# Patient Record
Sex: MALE | Race: WHITE | NOT HISPANIC OR LATINO | Employment: OTHER | URBAN - METROPOLITAN AREA
[De-identification: names, ages, dates, MRNs, and addresses within clinical notes are randomized per-mention and may not be internally consistent; named-entity substitution may affect disease eponyms.]

---

## 2017-02-25 PROBLEM — F29 UNSPECIFIED PSYCHOSIS: Status: ACTIVE | Noted: 2017-02-25

## 2017-02-25 PROBLEM — F25.0 SCHIZOAFFECTIVE DISORDER, BIPOLAR TYPE: Chronic | Status: ACTIVE | Noted: 2017-02-25

## 2017-02-25 PROBLEM — I15.9 SECONDARY HYPERTENSION: Chronic | Status: ACTIVE | Noted: 2017-02-25

## 2017-03-06 PROBLEM — I15.9 SECONDARY HYPERTENSION: Chronic | Status: ACTIVE | Noted: 2017-03-06

## 2017-03-06 PROBLEM — F17.210 CIGARETTE NICOTINE DEPENDENCE, UNCOMPLICATED: Status: ACTIVE | Noted: 2017-03-06

## 2017-03-06 PROBLEM — M54.9 BACK PAIN: Status: ACTIVE | Noted: 2017-03-06

## 2022-05-22 ENCOUNTER — HOSPITAL ENCOUNTER (INPATIENT)
Facility: HOSPITAL | Age: 62
LOS: 6 days | Discharge: PSYCHIATRIC OR SUBSTANCE USE FACILITY | DRG: 917 | End: 2022-05-28
Attending: EMERGENCY MEDICINE | Admitting: INTERNAL MEDICINE
Payer: COMMERCIAL

## 2022-05-22 DIAGNOSIS — T50.902D INTENTIONAL DRUG OVERDOSE, SUBSEQUENT ENCOUNTER: ICD-10-CM

## 2022-05-22 DIAGNOSIS — T14.91XA SUICIDE ATTEMPT: ICD-10-CM

## 2022-05-22 DIAGNOSIS — I49.9 ARRHYTHMIA: ICD-10-CM

## 2022-05-22 DIAGNOSIS — J96.01 ACUTE HYPOXEMIC RESPIRATORY FAILURE: ICD-10-CM

## 2022-05-22 DIAGNOSIS — T50.902A INTENTIONAL DRUG OVERDOSE, INITIAL ENCOUNTER: ICD-10-CM

## 2022-05-22 DIAGNOSIS — R79.89 ELEVATED TROPONIN: ICD-10-CM

## 2022-05-22 DIAGNOSIS — I42.9 CARDIOMYOPATHY, UNSPECIFIED TYPE: ICD-10-CM

## 2022-05-22 DIAGNOSIS — Z46.59 ENCOUNTER FOR FEEDING TUBE PLACEMENT: ICD-10-CM

## 2022-05-22 DIAGNOSIS — I50.41 ACUTE COMBINED SYSTOLIC AND DIASTOLIC CONGESTIVE HEART FAILURE: ICD-10-CM

## 2022-05-22 DIAGNOSIS — I50.9 CHF (CONGESTIVE HEART FAILURE): ICD-10-CM

## 2022-05-22 DIAGNOSIS — R00.0 TACHYCARDIA: ICD-10-CM

## 2022-05-22 DIAGNOSIS — R94.31 PROLONGED QT INTERVAL: ICD-10-CM

## 2022-05-22 DIAGNOSIS — Z97.8 ENDOTRACHEALLY INTUBATED: ICD-10-CM

## 2022-05-22 DIAGNOSIS — M62.82 NON-TRAUMATIC RHABDOMYOLYSIS: Primary | ICD-10-CM

## 2022-05-22 DIAGNOSIS — R50.9 HYPERTHERMIA: ICD-10-CM

## 2022-05-22 LAB
ALBUMIN SERPL BCP-MCNC: 3.2 G/DL (ref 3.5–5.2)
ALLENS TEST: ABNORMAL
ALP SERPL-CCNC: 41 U/L (ref 55–135)
ALT SERPL W/O P-5'-P-CCNC: 16 U/L (ref 10–44)
AMPHET+METHAMPHET UR QL: NEGATIVE
ANION GAP SERPL CALC-SCNC: 13 MMOL/L (ref 8–16)
APAP SERPL-MCNC: <3 UG/ML (ref 10–20)
AST SERPL-CCNC: 27 U/L (ref 10–40)
BARBITURATES UR QL SCN>200 NG/ML: NEGATIVE
BASOPHILS # BLD AUTO: 0.01 K/UL (ref 0–0.2)
BASOPHILS NFR BLD: 0.1 % (ref 0–1.9)
BENZODIAZ UR QL SCN>200 NG/ML: NEGATIVE
BILIRUB SERPL-MCNC: 1.3 MG/DL (ref 0.1–1)
BILIRUB UR QL STRIP: NEGATIVE
BNP SERPL-MCNC: 90 PG/ML (ref 0–99)
BUN SERPL-MCNC: 14 MG/DL (ref 8–23)
BZE UR QL SCN: NEGATIVE
CALCIUM SERPL-MCNC: 8.7 MG/DL (ref 8.7–10.5)
CANNABINOIDS UR QL SCN: NEGATIVE
CHLORIDE SERPL-SCNC: 115 MMOL/L (ref 95–110)
CK SERPL-CCNC: 1138 U/L (ref 20–200)
CLARITY UR: CLEAR
CO2 SERPL-SCNC: 18 MMOL/L (ref 23–29)
COLOR UR: YELLOW
CREAT SERPL-MCNC: 1.6 MG/DL (ref 0.5–1.4)
CREAT UR-MCNC: 51.3 MG/DL (ref 23–375)
CTP QC/QA: YES
CTP QC/QA: YES
DELSYS: ABNORMAL
DIFFERENTIAL METHOD: ABNORMAL
EOSINOPHIL # BLD AUTO: 0 K/UL (ref 0–0.5)
EOSINOPHIL NFR BLD: 0 % (ref 0–8)
ERYTHROCYTE [DISTWIDTH] IN BLOOD BY AUTOMATED COUNT: 14 % (ref 11.5–14.5)
ERYTHROCYTE [SEDIMENTATION RATE] IN BLOOD BY WESTERGREN METHOD: 24 MM/H
EST. GFR  (AFRICAN AMERICAN): 52 ML/MIN/1.73 M^2
EST. GFR  (NON AFRICAN AMERICAN): 45 ML/MIN/1.73 M^2
ETHANOL SERPL-MCNC: <10 MG/DL
FIO2: 100
GLUCOSE SERPL-MCNC: 122 MG/DL (ref 70–110)
GLUCOSE UR QL STRIP: NEGATIVE
HCO3 UR-SCNC: 20.5 MMOL/L (ref 24–28)
HCT VFR BLD AUTO: 40.3 % (ref 40–54)
HCV AB SERPL QL IA: NEGATIVE
HEP C VIRUS HOLD SPECIMEN: NORMAL
HGB BLD-MCNC: 14.2 G/DL (ref 14–18)
HGB UR QL STRIP: NEGATIVE
HIV 1+2 AB+HIV1 P24 AG SERPL QL IA: NEGATIVE
IMM GRANULOCYTES # BLD AUTO: 0.05 K/UL (ref 0–0.04)
IMM GRANULOCYTES NFR BLD AUTO: 0.4 % (ref 0–0.5)
KETONES UR QL STRIP: NEGATIVE
LACTATE SERPL-SCNC: 3.9 MMOL/L (ref 0.5–2.2)
LEUKOCYTE ESTERASE UR QL STRIP: NEGATIVE
LITHIUM SERPL-SCNC: <0.1 MMOL/L (ref 0.6–1.2)
LYMPHOCYTES # BLD AUTO: 0.8 K/UL (ref 1–4.8)
LYMPHOCYTES NFR BLD: 7.1 % (ref 18–48)
MCH RBC QN AUTO: 31.6 PG (ref 27–31)
MCHC RBC AUTO-ENTMCNC: 35.2 G/DL (ref 32–36)
MCV RBC AUTO: 90 FL (ref 82–98)
METHADONE UR QL SCN>300 NG/ML: NEGATIVE
MODE: ABNORMAL
MONOCYTES # BLD AUTO: 1 K/UL (ref 0.3–1)
MONOCYTES NFR BLD: 8.6 % (ref 4–15)
NEUTROPHILS # BLD AUTO: 9.6 K/UL (ref 1.8–7.7)
NEUTROPHILS NFR BLD: 83.8 % (ref 38–73)
NITRITE UR QL STRIP: NEGATIVE
NRBC BLD-RTO: 0 /100 WBC
OPIATES UR QL SCN: NEGATIVE
PCO2 BLDA: 43.9 MMHG (ref 35–45)
PCP UR QL SCN>25 NG/ML: NEGATIVE
PEEP: 5
PH SMN: 7.28 [PH] (ref 7.35–7.45)
PH UR STRIP: 6 [PH] (ref 5–8)
PLATELET # BLD AUTO: 160 K/UL (ref 150–450)
PMV BLD AUTO: 8.8 FL (ref 9.2–12.9)
PO2 BLDA: 223 MMHG (ref 80–100)
POC BE: -6 MMOL/L
POC MOLECULAR INFLUENZA A AGN: NEGATIVE
POC MOLECULAR INFLUENZA B AGN: NEGATIVE
POC SATURATED O2: 100 % (ref 95–100)
POTASSIUM SERPL-SCNC: 3.3 MMOL/L (ref 3.5–5.1)
PROT SERPL-MCNC: 6 G/DL (ref 6–8.4)
PROT UR QL STRIP: NEGATIVE
RBC # BLD AUTO: 4.5 M/UL (ref 4.6–6.2)
SALICYLATES SERPL-MCNC: <5 MG/DL (ref 15–30)
SAMPLE: ABNORMAL
SARS-COV-2 RDRP RESP QL NAA+PROBE: NEGATIVE
SITE: ABNORMAL
SODIUM SERPL-SCNC: 146 MMOL/L (ref 136–145)
SP GR UR STRIP: 1.01 (ref 1–1.03)
TOXICOLOGY INFORMATION: NORMAL
TROPONIN I SERPL DL<=0.01 NG/ML-MCNC: 0.05 NG/ML (ref 0–0.03)
TSH SERPL DL<=0.005 MIU/L-ACNC: 0.79 UIU/ML (ref 0.4–4)
URN SPEC COLLECT METH UR: NORMAL
UROBILINOGEN UR STRIP-ACNC: NEGATIVE EU/DL
VT: 440
WBC # BLD AUTO: 11.42 K/UL (ref 3.9–12.7)

## 2022-05-22 PROCEDURE — 84484 ASSAY OF TROPONIN QUANT: CPT | Performed by: EMERGENCY MEDICINE

## 2022-05-22 PROCEDURE — 27100108

## 2022-05-22 PROCEDURE — 96374 THER/PROPH/DIAG INJ IV PUSH: CPT | Mod: 59

## 2022-05-22 PROCEDURE — 99291 CRITICAL CARE FIRST HOUR: CPT | Mod: 25

## 2022-05-22 PROCEDURE — 87502 INFLUENZA DNA AMP PROBE: CPT

## 2022-05-22 PROCEDURE — 25000003 PHARM REV CODE 250: Performed by: INTERNAL MEDICINE

## 2022-05-22 PROCEDURE — 20000000 HC ICU ROOM

## 2022-05-22 PROCEDURE — 93005 ELECTROCARDIOGRAM TRACING: CPT

## 2022-05-22 PROCEDURE — 93010 EKG 12-LEAD: ICD-10-PCS | Mod: 76,,, | Performed by: INTERNAL MEDICINE

## 2022-05-22 PROCEDURE — 96366 THER/PROPH/DIAG IV INF ADDON: CPT

## 2022-05-22 PROCEDURE — 99900035 HC TECH TIME PER 15 MIN (STAT)

## 2022-05-22 PROCEDURE — 99292 CRITICAL CARE ADDL 30 MIN: CPT

## 2022-05-22 PROCEDURE — 96360 HYDRATION IV INFUSION INIT: CPT | Mod: 59

## 2022-05-22 PROCEDURE — 82077 ASSAY SPEC XCP UR&BREATH IA: CPT | Performed by: EMERGENCY MEDICINE

## 2022-05-22 PROCEDURE — 82803 BLOOD GASES ANY COMBINATION: CPT

## 2022-05-22 PROCEDURE — 82550 ASSAY OF CK (CPK): CPT | Performed by: EMERGENCY MEDICINE

## 2022-05-22 PROCEDURE — 93010 ELECTROCARDIOGRAM REPORT: CPT | Mod: 76,,, | Performed by: INTERNAL MEDICINE

## 2022-05-22 PROCEDURE — 87040 BLOOD CULTURE FOR BACTERIA: CPT | Performed by: EMERGENCY MEDICINE

## 2022-05-22 PROCEDURE — 96365 THER/PROPH/DIAG IV INF INIT: CPT | Mod: 59

## 2022-05-22 PROCEDURE — U0002 COVID-19 LAB TEST NON-CDC: HCPCS | Performed by: EMERGENCY MEDICINE

## 2022-05-22 PROCEDURE — 83880 ASSAY OF NATRIURETIC PEPTIDE: CPT | Performed by: EMERGENCY MEDICINE

## 2022-05-22 PROCEDURE — 63600175 PHARM REV CODE 636 W HCPCS

## 2022-05-22 PROCEDURE — 81003 URINALYSIS AUTO W/O SCOPE: CPT | Mod: 59 | Performed by: EMERGENCY MEDICINE

## 2022-05-22 PROCEDURE — 31500 INSERT EMERGENCY AIRWAY: CPT

## 2022-05-22 PROCEDURE — 63600175 PHARM REV CODE 636 W HCPCS: Performed by: EMERGENCY MEDICINE

## 2022-05-22 PROCEDURE — 25000003 PHARM REV CODE 250

## 2022-05-22 PROCEDURE — 80053 COMPREHEN METABOLIC PANEL: CPT | Performed by: EMERGENCY MEDICINE

## 2022-05-22 PROCEDURE — 36600 WITHDRAWAL OF ARTERIAL BLOOD: CPT

## 2022-05-22 PROCEDURE — 87389 HIV-1 AG W/HIV-1&-2 AB AG IA: CPT | Performed by: EMERGENCY MEDICINE

## 2022-05-22 PROCEDURE — A4216 STERILE WATER/SALINE, 10 ML: HCPCS | Performed by: INTERNAL MEDICINE

## 2022-05-22 PROCEDURE — 83605 ASSAY OF LACTIC ACID: CPT | Performed by: EMERGENCY MEDICINE

## 2022-05-22 PROCEDURE — 93010 ELECTROCARDIOGRAM REPORT: CPT | Mod: ,,, | Performed by: INTERNAL MEDICINE

## 2022-05-22 PROCEDURE — 25000003 PHARM REV CODE 250: Performed by: EMERGENCY MEDICINE

## 2022-05-22 PROCEDURE — 86803 HEPATITIS C AB TEST: CPT | Performed by: EMERGENCY MEDICINE

## 2022-05-22 PROCEDURE — 27200966 HC CLOSED SUCTION SYSTEM

## 2022-05-22 PROCEDURE — 84443 ASSAY THYROID STIM HORMONE: CPT | Performed by: EMERGENCY MEDICINE

## 2022-05-22 PROCEDURE — 80307 DRUG TEST PRSMV CHEM ANLYZR: CPT | Performed by: EMERGENCY MEDICINE

## 2022-05-22 PROCEDURE — 94002 VENT MGMT INPAT INIT DAY: CPT

## 2022-05-22 PROCEDURE — 80143 DRUG ASSAY ACETAMINOPHEN: CPT | Performed by: EMERGENCY MEDICINE

## 2022-05-22 PROCEDURE — 80179 DRUG ASSAY SALICYLATE: CPT | Performed by: EMERGENCY MEDICINE

## 2022-05-22 PROCEDURE — 80337 TRICYCLIC & CYCLICALS 6/MORE: CPT | Performed by: EMERGENCY MEDICINE

## 2022-05-22 PROCEDURE — 85025 COMPLETE CBC W/AUTO DIFF WBC: CPT | Performed by: EMERGENCY MEDICINE

## 2022-05-22 PROCEDURE — 27000221 HC OXYGEN, UP TO 24 HOURS

## 2022-05-22 PROCEDURE — 80178 ASSAY OF LITHIUM: CPT | Performed by: EMERGENCY MEDICINE

## 2022-05-22 RX ORDER — ENOXAPARIN SODIUM 100 MG/ML
40 INJECTION SUBCUTANEOUS EVERY 24 HOURS
Status: DISCONTINUED | OUTPATIENT
Start: 2022-05-23 | End: 2022-05-29 | Stop reason: HOSPADM

## 2022-05-22 RX ORDER — NOREPINEPHRINE BITARTRATE/D5W 4MG/250ML
0-3 PLASTIC BAG, INJECTION (ML) INTRAVENOUS CONTINUOUS
Status: DISCONTINUED | OUTPATIENT
Start: 2022-05-23 | End: 2022-05-23

## 2022-05-22 RX ORDER — LANOLIN ALCOHOL/MO/W.PET/CERES
800 CREAM (GRAM) TOPICAL
Status: DISCONTINUED | OUTPATIENT
Start: 2022-05-22 | End: 2022-05-23

## 2022-05-22 RX ORDER — PROPOFOL 10 MG/ML
5 INJECTION, EMULSION INTRAVENOUS CONTINUOUS
Status: DISCONTINUED | OUTPATIENT
Start: 2022-05-22 | End: 2022-05-22

## 2022-05-22 RX ORDER — GLUCAGON 1 MG
1 KIT INJECTION
Status: DISCONTINUED | OUTPATIENT
Start: 2022-05-22 | End: 2022-05-29 | Stop reason: HOSPADM

## 2022-05-22 RX ORDER — TALC
6 POWDER (GRAM) TOPICAL NIGHTLY PRN
Status: DISCONTINUED | OUTPATIENT
Start: 2022-05-22 | End: 2022-05-29 | Stop reason: HOSPADM

## 2022-05-22 RX ORDER — ETOMIDATE 2 MG/ML
20 INJECTION INTRAVENOUS
Status: COMPLETED | OUTPATIENT
Start: 2022-05-22 | End: 2022-05-22

## 2022-05-22 RX ORDER — SODIUM CHLORIDE 0.9 % (FLUSH) 0.9 %
10 SYRINGE (ML) INJECTION EVERY 8 HOURS
Status: DISCONTINUED | OUTPATIENT
Start: 2022-05-22 | End: 2022-05-29 | Stop reason: HOSPADM

## 2022-05-22 RX ORDER — IBUPROFEN 200 MG
24 TABLET ORAL
Status: DISCONTINUED | OUTPATIENT
Start: 2022-05-22 | End: 2022-05-29 | Stop reason: HOSPADM

## 2022-05-22 RX ORDER — ETOMIDATE 2 MG/ML
INJECTION INTRAVENOUS
Status: COMPLETED
Start: 2022-05-22 | End: 2022-05-22

## 2022-05-22 RX ORDER — HYDROCODONE BITARTRATE AND ACETAMINOPHEN 5; 325 MG/1; MG/1
1 TABLET ORAL EVERY 6 HOURS PRN
Status: DISCONTINUED | OUTPATIENT
Start: 2022-05-22 | End: 2022-05-23

## 2022-05-22 RX ORDER — NALOXONE HYDROCHLORIDE 1 MG/ML
0.02 INJECTION INTRAMUSCULAR; INTRAVENOUS; SUBCUTANEOUS
Status: DISCONTINUED | OUTPATIENT
Start: 2022-05-22 | End: 2022-05-29 | Stop reason: HOSPADM

## 2022-05-22 RX ORDER — IBUPROFEN 200 MG
16 TABLET ORAL
Status: DISCONTINUED | OUTPATIENT
Start: 2022-05-22 | End: 2022-05-29 | Stop reason: HOSPADM

## 2022-05-22 RX ORDER — NAPROXEN SODIUM 220 MG/1
81 TABLET, FILM COATED ORAL DAILY
Status: DISCONTINUED | OUTPATIENT
Start: 2022-05-23 | End: 2022-05-29 | Stop reason: HOSPADM

## 2022-05-22 RX ORDER — ACETAMINOPHEN 325 MG/1
650 TABLET ORAL EVERY 4 HOURS PRN
Status: DISCONTINUED | OUTPATIENT
Start: 2022-05-22 | End: 2022-05-29 | Stop reason: HOSPADM

## 2022-05-22 RX ORDER — ROCURONIUM BROMIDE 10 MG/ML
INJECTION, SOLUTION INTRAVENOUS
Status: COMPLETED
Start: 2022-05-22 | End: 2022-05-22

## 2022-05-22 RX ORDER — ROCURONIUM BROMIDE 10 MG/ML
70 INJECTION, SOLUTION INTRAVENOUS ONCE
Status: COMPLETED | OUTPATIENT
Start: 2022-05-22 | End: 2022-05-22

## 2022-05-22 RX ORDER — POLYETHYLENE GLYCOL 3350 17 G/17G
17 POWDER, FOR SOLUTION ORAL DAILY PRN
Status: DISCONTINUED | OUTPATIENT
Start: 2022-05-22 | End: 2022-05-24

## 2022-05-22 RX ORDER — MORPHINE SULFATE 4 MG/ML
4 INJECTION, SOLUTION INTRAMUSCULAR; INTRAVENOUS
Status: DISCONTINUED | OUTPATIENT
Start: 2022-05-22 | End: 2022-05-23

## 2022-05-22 RX ORDER — PROPOFOL 10 MG/ML
5 INJECTION, EMULSION INTRAVENOUS CONTINUOUS
Status: DISCONTINUED | OUTPATIENT
Start: 2022-05-22 | End: 2022-05-23

## 2022-05-22 RX ORDER — PROPOFOL 10 MG/ML
INJECTION, EMULSION INTRAVENOUS
Status: COMPLETED
Start: 2022-05-22 | End: 2022-05-22

## 2022-05-22 RX ADMIN — SODIUM CHLORIDE, SODIUM LACTATE, POTASSIUM CHLORIDE, AND CALCIUM CHLORIDE 500 ML: .6; .31; .03; .02 INJECTION, SOLUTION INTRAVENOUS at 11:05

## 2022-05-22 RX ADMIN — ETOMIDATE 20 MG: 2 INJECTION INTRAVENOUS at 05:05

## 2022-05-22 RX ADMIN — SODIUM CHLORIDE, SODIUM LACTATE, POTASSIUM CHLORIDE, AND CALCIUM CHLORIDE 1000 ML: .6; .31; .03; .02 INJECTION, SOLUTION INTRAVENOUS at 07:05

## 2022-05-22 RX ADMIN — CEFTRIAXONE SODIUM 2 G: 2 INJECTION, SOLUTION INTRAVENOUS at 05:05

## 2022-05-22 RX ADMIN — Medication 0.02 MCG/KG/MIN: at 11:05

## 2022-05-22 RX ADMIN — PROPOFOL 5 MCG/KG/MIN: 10 INJECTION, EMULSION INTRAVENOUS at 05:05

## 2022-05-22 RX ADMIN — SODIUM BICARBONATE: 84 INJECTION, SOLUTION INTRAVENOUS at 11:05

## 2022-05-22 RX ADMIN — SODIUM CHLORIDE, SODIUM LACTATE, POTASSIUM CHLORIDE, AND CALCIUM CHLORIDE 2500 ML: .6; .31; .03; .02 INJECTION, SOLUTION INTRAVENOUS at 05:05

## 2022-05-22 RX ADMIN — ROCURONIUM BROMIDE 70 MG: 10 INJECTION, SOLUTION INTRAVENOUS at 05:05

## 2022-05-22 RX ADMIN — SODIUM BICARBONATE: 84 INJECTION, SOLUTION INTRAVENOUS at 05:05

## 2022-05-22 RX ADMIN — Medication 10 ML: at 11:05

## 2022-05-22 RX ADMIN — AZITHROMYCIN 500 MG: 500 INJECTION, POWDER, LYOPHILIZED, FOR SOLUTION INTRAVENOUS at 06:05

## 2022-05-22 NOTE — ED PROVIDER NOTES
SCRIBE #1 NOTE: I, Mildred Wetzel, am scribing for, and in the presence of, Erickson Arana MD. I have scribed the entire note.       History     Chief Complaint   Patient presents with    Drug Overdose     Pt last known well time 1700 5/21/22. Respiradol bottle found . Pt tachypneic and tachycardic. Incomprehensible moaning and vomitus on mouth      Review of patient's allergies indicates:   Allergen Reactions    Haldol [haloperidol lactate]      Shaking      Lamictal [lamotrigine]     Trileptal [oxcarbazepine]          History of Present Illness     HPI    5/22/2022, 5:13 PM  History obtained from the [facility], limited given patient's AMS      History of Present Illness: Tristin Saha is a 62 y.o. male patient who presents to the Emergency Department for evaluation of altered mental status with his last known well-time being 17:00, 5/21/22. According to [facility], pt was found vomiting and unresponsive by his wife in a hot apartment; additionally, an empty Respiradol bottle filled on 5/6/22 with 45 tablets to be taken twice a day was found next to him. [facility] states that his BP upon arrival was 74/47, and after 500 mL of fluids, pt's BP was 118/76. Pt also had a rectal temperature of 103 degrees and was at 140 BPM, with his breathing at 48 breaths per minute on arrival to ED. Pt noted to have vomit around his mouth and was incomprehensibly moaning as well. Symptoms are constant and moderate in severity. Prior Tx includes 2mg of narcan IN and 2mg of narcan IV via [facility]. No further complaints or concerns at this time. History is limited and the ROS cannot be completed due to mental status.    8:04 PM: Family at bedside. According to pt's wife, the pt probably overdosed sometime last night and purposefully did not turn on any air. Additionally, the family states that the pt has a history of suicidal ideations and paranoia. Has recently been stating he wanted to kill himself. Has overdosed on medication in front of  family before. They were unaware of a Risperdal prescription. Daughter brings u p concerns that the patient is not safe in his home environment.       Arrival mode: [facility]    PCP: Primary Doctor No        Past Medical History:  Past Medical History:   Diagnosis Date    Depression        Past Surgical History:  No past surgical history on file.      Family History:  No family history on file.    Social History:  Social History     Tobacco Use    Smoking status: Current Some Day Smoker    Smokeless tobacco: Not on file   Substance and Sexual Activity    Alcohol use: Not on file    Drug use: Not on file    Sexual activity: Not on file        Review of Systems     Review of Systems   Unable to perform ROS: Mental status change        Physical Exam     Initial Vitals   BP Pulse Resp Temp SpO2   05/22/22 1711 05/22/22 1711 05/22/22 1711 05/22/22 1651 05/22/22 1704   134/81 (!) 123 11 (!) 103.7 °F (39.8 °C) 99 %      MAP       --                 Physical Exam  Nursing Notes and Vital Signs Reviewed.  Constitutional: Patient is in moderate distress. Well-developed and well-nourished.  Head: Atraumatic. Normocephalic.  Eyes:  PERRL. EOM intact. Conjunctivae are not pale. No scleral icterus.  ENT: Mucous membranes are moist. Oropharynx is clear and symmetric.    Neck: Supple. Full ROM. No lymphadenopathy.  Cardiovascular: Tachycardic. Regular rhythm. No murmurs, rubs, or gallops. Distal pulses are 2+ and symmetric.  Pulmonary/Chest: Tachypneic. Clear to auscultation bilaterally. No wheezing or rales.  Abdominal: Soft and non-distended.  There is no tenderness.  No rebound, guarding, or rigidity. Good bowel sounds.  Genitourinary: No CVA tenderness  Musculoskeletal: Moves all extremities. No obvious deformities. No edema. No calf tenderness.  Skin: Warm and dry. Non-traumatic.  Neurological: Not following command to voice.  Psychiatric: Not opening eyes to voice     ED Course   Intubation    Date/Time: 5/22/2022 5:13  PM  Location procedure was performed: [facility]  Performed by: Erickson Arana MD  Authorized by: Erickson Arana MD   Consent Done: Emergent Situation  Indications: airway protection and  respiratory distress  Intubation method: video-assisted  Patient status: paralyzed (RSI)  Preoxygenation: bag valve mask  Pretreatment medications: none  Sedatives: etomidate  Paralytic: rocuronium  Laryngoscope size: Glide 4  Tube size: 7.5 mm  Tube type: cuffed  Number of attempts: 1  Cricoid pressure: no  Cords visualized: yes  Post-procedure assessment: chest rise and CO2 detector  Breath sounds: equal and absent over the epigastrium and clear  Cuff inflated: yes  ETT to teeth: 22 cm  Tube secured with: ETT russell  Chest x-ray interpreted by me and radiologist.  Chest x-ray findings: endotracheal tube in appropriate position  Patient tolerance: Patient tolerated the procedure well with no immediate complications    Critical Care    Date/Time: 5/22/2022 6:11 PM  Performed by: Erickson Arana MD  Authorized by: Erickson Arana MD   Direct patient critical care time: 15 minutes  Additional history critical care time: 12 minutes  Ordering / reviewing critical care time: 11 minutes  Documentation critical care time: 10 minutes  Consulting other physicians critical care time: 10 minutes  Consult with family critical care time: 12 minutes  Other critical care time: 11 minutes  Total critical care time (exclusive of procedural time) : 81 minutes  Critical care time was exclusive of separately billable procedures and treating other patients and teaching time.  Critical care was necessary to treat or prevent imminent or life-threatening deterioration of the following conditions: metabolic crisis (Respiratory distress).  Critical care was time spent personally by me on the following activities: blood draw for specimens, review of old charts, re-evaluation of patient's condition, pulse oximetry, ordering and review of  radiographic studies, ordering and review of laboratory studies, development of treatment plan with patient or surrogate, discussions with consultants, interpretation of cardiac output measurements, evaluation of patient's response to treatment, examination of patient, obtaining history from patient or surrogate and ordering and performing treatments and interventions.        ED Vital Signs:  Vitals:    05/22/22 2312 05/22/22 2315 05/22/22 2330 05/22/22 2345   BP: (!) 86/51 (!) 86/51 (!) 69/48 (!) 84/52   Pulse: 98 99 94 92   Resp: (!) 25 (!) 22 16 18   Temp:       TempSrc:       SpO2: 97% 98% 98% 100%   Weight:       Height:        05/23/22 0000 05/23/22 0015 05/23/22 0030 05/23/22 0036   BP: (!) 92/52 (!) 100/56 (!) 86/58 (!) 86/58   Pulse: 90 89 91 92   Resp: (!) 23 (!) 26 17 (!) 23   Temp:       TempSrc:       SpO2: 100% 96% 97% 97%   Weight:       Height:        05/23/22 0045 05/23/22 0100 05/23/22 0115 05/23/22 0130   BP:  (!) 99/56 (!) 80/51 93/62   Pulse: 99 89 87 88   Resp: (!) 22 17 16 16   Temp:       TempSrc:       SpO2: 99% 98% 98% 98%   Weight:       Height:        05/23/22 0145 05/23/22 0200 05/23/22 0215   BP: 98/62 100/65 97/66   Pulse: 85 85 82   Resp: 19 16 12   Temp:      TempSrc:      SpO2: 97% 99% 99%   Weight:      Height:          Abnormal Lab Results:  Labs Reviewed   CBC W/ AUTO DIFFERENTIAL - Abnormal; Notable for the following components:       Result Value    RBC 4.50 (*)     MCH 31.6 (*)     MPV 8.8 (*)     Gran # (ANC) 9.6 (*)     Immature Grans (Abs) 0.05 (*)     Lymph # 0.8 (*)     Gran % 83.8 (*)     Lymph % 7.1 (*)     All other components within normal limits    Narrative:     Release to patient->Immediate   COMPREHENSIVE METABOLIC PANEL - Abnormal; Notable for the following components:    Sodium 146 (*)     Potassium 3.3 (*)     Chloride 115 (*)     CO2 18 (*)     Glucose 122 (*)     Creatinine 1.6 (*)     Albumin 3.2 (*)     Total Bilirubin 1.3 (*)     Alkaline Phosphatase 41  (*)     eGFR if  52 (*)     eGFR if non  45 (*)     All other components within normal limits    Narrative:     Release to patient->Immediate   CK - Abnormal; Notable for the following components:    CPK 1138 (*)     All other components within normal limits    Narrative:     Release to patient->Immediate   TROPONIN I - Abnormal; Notable for the following components:    Troponin I 0.050 (*)     All other components within normal limits    Narrative:     Release to patient->Immediate   LACTIC ACID, PLASMA - Abnormal; Notable for the following components:    Lactate (Lactic Acid) 3.9 (*)     All other components within normal limits    Narrative:     Release to patient->Immediate     Lactic acid critical result(s) called and verbal readback obtained   from Dr. Erickson Arana ER by CD9 05/22/2022 18:57   ACETAMINOPHEN LEVEL - Abnormal; Notable for the following components:    Acetaminophen (Tylenol), Serum <3.0 (*)     All other components within normal limits    Narrative:     Release to patient->Immediate   SALICYLATE LEVEL - Abnormal; Notable for the following components:    Salicylate Lvl <5.0 (*)     All other components within normal limits    Narrative:     Release to patient->Immediate   ISTAT PROCEDURE - Abnormal; Notable for the following components:    POC PH 7.278 (*)     POC PO2 223 (*)     POC HCO3 20.5 (*)     All other components within normal limits   CULTURE, BLOOD   CULTURE, BLOOD   CULTURE, BLOOD   HIV 1 / 2 ANTIBODY    Narrative:     Release to patient->Immediate   HEPATITIS C ANTIBODY    Narrative:     Release to patient->Immediate   HEP C VIRUS HOLD SPECIMEN    Narrative:     Release to patient->Immediate   B-TYPE NATRIURETIC PEPTIDE    Narrative:     Release to patient->Immediate   URINALYSIS, REFLEX TO URINE CULTURE    Narrative:     Specimen Source->Urine   TSH    Narrative:     Release to patient->Immediate   ALCOHOL,MEDICAL (ETHANOL)    Narrative:     Release  to patient->Immediate   DRUG SCREEN PANEL, URINE EMERGENCY   DRUG SCREEN PANEL, URINE EMERGENCY    Narrative:     Specimen Source->Urine   TRICYCLIC ANTIDEPRESSANT SCREEN (REF LAB)   SARS-COV-2 RDRP GENE    Narrative:     This test utilizes isothermal nucleic acid amplification   technology to detect the SARS-CoV-2 RdRp nucleic acid segment.   The analytical sensitivity (limit of detection) is 125 genome   equivalents/mL.   A POSITIVE result implies infection with the SARS-CoV-2 virus;   the patient is presumed to be contagious.     A NEGATIVE result means that SARS-CoV-2 nucleic acids are not   present above the limit of detection. A NEGATIVE result should be   treated as presumptive. It does not rule out the possibility of   COVID-19 and should not be the sole basis for treatment decisions.   If COVID-19 is strongly suspected based on clinical and exposure   history, re-testing using an alternate molecular assay should be   considered.   This test is only for use under the Food and Drug   Administration s Emergency Use Authorization (EUA).   Commercial kits are provided by [email or web address].   Performance characteristics of the EUA have been independently   verified by [facility] Department of   Pathology and Laboratory Medicine.   _________________________________________________________________   The authorized Fact Sheet for Healthcare Providers and the authorized Fact   Sheet for Patients of the ID NOW COVID-19 are available on the FDA   website:     https://www.fda.gov/media/586050/download  https://www.fda.gov/media/483679/download          POCT INFLUENZA A/B MOLECULAR        All Lab Results:  Results for orders placed or performed during the hospital encounter of 05/22/22   HIV 1/2 Ag/Ab (4th Gen)   Result Value Ref Range    HIV 1/2 Ag/Ab Negative Negative   Hepatitis C Antibody   Result Value Ref Range    Hepatitis C Ab Negative Negative   HCV Virus Hold Specimen   Result Value Ref Range     HEP C Virus Hold Specimen Hold for HCV sendout    CBC auto differential   Result Value Ref Range    WBC 11.42 3.90 - 12.70 K/uL    RBC 4.50 (L) 4.60 - 6.20 M/uL    Hemoglobin 14.2 14.0 - 18.0 g/dL    Hematocrit 40.3 40.0 - 54.0 %    MCV 90 82 - 98 fL    MCH 31.6 (H) 27.0 - 31.0 pg    MCHC 35.2 32.0 - 36.0 g/dL    RDW 14.0 11.5 - 14.5 %    Platelets 160 150 - 450 K/uL    MPV 8.8 (L) 9.2 - 12.9 fL    Immature Granulocytes 0.4 0.0 - 0.5 %    Gran # (ANC) 9.6 (H) 1.8 - 7.7 K/uL    Immature Grans (Abs) 0.05 (H) 0.00 - 0.04 K/uL    Lymph # 0.8 (L) 1.0 - 4.8 K/uL    Mono # 1.0 0.3 - 1.0 K/uL    Eos # 0.0 0.0 - 0.5 K/uL    Baso # 0.01 0.00 - 0.20 K/uL    nRBC 0 0 /100 WBC    Gran % 83.8 (H) 38.0 - 73.0 %    Lymph % 7.1 (L) 18.0 - 48.0 %    Mono % 8.6 4.0 - 15.0 %    Eosinophil % 0.0 0.0 - 8.0 %    Basophil % 0.1 0.0 - 1.9 %    Differential Method Automated    Comprehensive metabolic panel   Result Value Ref Range    Sodium 146 (H) 136 - 145 mmol/L    Potassium 3.3 (L) 3.5 - 5.1 mmol/L    Chloride 115 (H) 95 - 110 mmol/L    CO2 18 (L) 23 - 29 mmol/L    Glucose 122 (H) 70 - 110 mg/dL    BUN 14 8 - 23 mg/dL    Creatinine 1.6 (H) 0.5 - 1.4 mg/dL    Calcium 8.7 8.7 - 10.5 mg/dL    Total Protein 6.0 6.0 - 8.4 g/dL    Albumin 3.2 (L) 3.5 - 5.2 g/dL    Total Bilirubin 1.3 (H) 0.1 - 1.0 mg/dL    Alkaline Phosphatase 41 (L) 55 - 135 U/L    AST 27 10 - 40 U/L    ALT 16 10 - 44 U/L    Anion Gap 13 8 - 16 mmol/L    eGFR if African American 52 (A) >60 mL/min/1.73 m^2    eGFR if non African American 45 (A) >60 mL/min/1.73 m^2   CK   Result Value Ref Range    CPK 1138 (H) 20 - 200 U/L   Brain natriuretic peptide   Result Value Ref Range    BNP 90 0 - 99 pg/mL   Troponin I   Result Value Ref Range    Troponin I 0.050 (H) 0.000 - 0.026 ng/mL   Lactic acid, plasma   Result Value Ref Range    Lactate (Lactic Acid) 3.9 (HH) 0.5 - 2.2 mmol/L   Urinalysis, Reflex to Urine Culture Urine, Clean Catch    Specimen: Urine   Result Value Ref Range     Specimen UA Urine, Catheterized     Color, UA Yellow Yellow, Straw, Brianna    Appearance, UA Clear Clear    pH, UA 6.0 5.0 - 8.0    Specific Gravity, UA 1.010 1.005 - 1.030    Protein, UA Negative Negative    Glucose, UA Negative Negative    Ketones, UA Negative Negative    Bilirubin (UA) Negative Negative    Occult Blood UA Negative Negative    Nitrite, UA Negative Negative    Urobilinogen, UA Negative <2.0 EU/dL    Leukocytes, UA Negative Negative   TSH   Result Value Ref Range    TSH 0.793 0.400 - 4.000 uIU/mL   Ethanol   Result Value Ref Range    Alcohol, Serum <10 <10 mg/dL   Acetaminophen level   Result Value Ref Range    Acetaminophen (Tylenol), Serum <3.0 (L) 10.0 - 20.0 ug/mL   Salicylate level   Result Value Ref Range    Salicylate Lvl <5.0 (L) 15.0 - 30.0 mg/dL   Drug screen panel, in-house   Result Value Ref Range    Benzodiazepines Negative Negative    Methadone metabolites Negative Negative    Cocaine (Metab.) Negative Negative    Opiate Scrn, Ur Negative Negative    Barbiturate Screen, Ur Negative Negative    Amphetamine Screen, Ur Negative Negative    THC Negative Negative    Phencyclidine Negative Negative    Creatinine, Urine 51.3 23.0 - 375.0 mg/dL    Toxicology Information SEE COMMENT    Lithium level   Result Value Ref Range    Lithium Level <0.1 (L) 0.6 - 1.2 mmol/L   POCT COVID-19 Rapid Screening   Result Value Ref Range    POC Rapid COVID Negative Negative     Acceptable Yes    POCT Influenza A/B Molecular   Result Value Ref Range    POC Molecular Influenza A Ag Negative Negative, Not Reported    POC Molecular Influenza B Ag Negative Negative, Not Reported     Acceptable Yes    ISTAT PROCEDURE   Result Value Ref Range    POC PH 7.278 (LL) 7.35 - 7.45    POC PCO2 43.9 35 - 45 mmHg    POC PO2 223 (H) 80 - 100 mmHg    POC HCO3 20.5 (L) 24 - 28 mmol/L    POC BE -6 -2 to 2 mmol/L    POC SATURATED O2 100 95 - 100 %    Rate 24     Sample ARTERIAL     Site RR     Allens  Test Pass     DelSys Adult Vent     Mode AC/PRVC     Vt 440     PEEP 5     FiO2 100    ISTAT PROCEDURE   Result Value Ref Range    POC PH 7.494 (H) 7.35 - 7.45    POC PCO2 28.9 (LL) 35 - 45 mmHg    POC PO2 111 (H) 80 - 100 mmHg    POC HCO3 22.2 (L) 24 - 28 mmol/L    POC BE -1 -2 to 2 mmol/L    POC SATURATED O2 99 95 - 100 %    Rate 24     Sample ARTERIAL     Site LR     Allens Test Pass     DelSys Adult Vent     Mode AC/PRVC     Vt 440     PEEP 5     FiO2 50          Imaging Results:  Imaging Results          CT Head Without Contrast (Final result)  Result time 05/22/22 20:02:44    Final result by Tristin Campos MD (05/22/22 20:02:44)                 Impression:      No acute intracranial CT abnormality.  Clinical correlation and further evaluation as warranted.    All CT scans at this facility are performed  using dose modulation techniques as appropriate to performed exam including the following:  automated exposure control; adjustment of mA and/or kV according to the patients size (this includes techniques or standardized protocols for targeted exams where dose is matched to indication/reason for exam: i.e. extremities or head);  iterative reconstruction technique.      Electronically signed by: Tristin Campos  Date:    05/22/2022  Time:    20:02             Narrative:    EXAMINATION:  CT HEAD WITHOUT CONTRAST    CLINICAL HISTORY:  Mental status change, unknown cause;    TECHNIQUE:  Low dose axial CT images obtained throughout the head without intravenous contrast. Sagittal and coronal reconstructions were performed.    COMPARISON:  None.    FINDINGS:  Intracranial compartment:    Ventricles and sulci are normal in size for age without evidence of hydrocephalus. No extra-axial blood or fluid collections.    The brain parenchyma appears normal. No parenchymal mass, hemorrhage, edema or major vascular distribution infarct.    Skull/extracranial contents (limited evaluation): No fracture. Mastoid air cells and  paranasal sinuses are essentially clear.                                X-Ray Chest AP Portable (Final result)  Result time 05/22/22 17:29:47    Final result by Tristin Campos MD (05/22/22 17:29:47)                 Impression:      As above.    This report was flagged in Epic as abnormal.      Electronically signed by: Tristin Campos  Date:    05/22/2022  Time:    17:29             Narrative:    EXAMINATION:  XR CHEST AP PORTABLE    CLINICAL HISTORY:  Encounter for fitting and adjustment of other gastrointestinal appliance and device    TECHNIQUE:  Single frontal view of the chest was performed.    COMPARISON:  Multiple priors.    FINDINGS:  Endotracheal tube tip lies approximately 4 cm from the darius in good position.  Enteric tube tip and side hole overlie the stomach in good position.    Bilateral pulmonary opacities possibly related to pneumonia or aspiration.  Correlation is advised.    Perihilar prominence possibly related to vascular congestion or adenopathy.  Follow-up once the patient's acute symptoms have resolved would be recommended.    The cardiac silhouette is normal in size.    Bones are intact.                                 The EKG was ordered, reviewed, and independently interpreted by the ED provider.  Interpretation time: 15:52  Rate: 132 BPM  Rhythm: sinus tachycardia with occasional pre,mature ventricular complexes.  Interpretation: Left axis deviation. Inferior infarct, age undetermined. Anterolateral infarct, age undetermined. Abnormal ECG. No STEMI.           The Emergency Provider reviewed the vital signs and test results, which are outlined above.     ED Discussion     8:32 PM: Discussed case with Rachele Adams NP (Hospital Medicine). Dr. Styles agrees with current care and management of pt and accepts admission.   Admitting Service: Hospital Medicine  Admitting Physician: Dr. Styles  Admit to: ICU    8:42 PM: Re-evaluated pt. I have discussed test results, shared treatment plan, and  the need for admission with patient and family at bedside. Pt and family express understanding at this time and agree with all information. All questions answered. Pt and family have no further questions or concerns at this time. Pt is ready for admit.      ED Course as of 05/23/22 0237   Sun May 22, 2022   1715 Ice bags placed in patient's groin and axilla.  [BA]   1719 QRS >100, in the setting of POSSIBLE overdose, will start isotonic bicarb.  [BA]   1759 Spoke with poison control, no recommendations for dantrolene or cyproheptadine.  [BA]   1857 Lactic acid 3.9 [BA]      ED Course User Index  [BA] Erickson Arana MD     Medical Decision Making:   Clinical Tests:   Lab Tests: Ordered and Reviewed  Radiological Study: Ordered and Reviewed  Medical Tests: Ordered and Reviewed           ED Medication(s):  Medications   sodium bicarbonate 150 mEq in dextrose 5 % 1,150 mL infusion ( Intravenous Verify Only 5/23/22 0200)   morphine injection 4 mg (4 mg Intravenous Not Given 5/22/22 2030)   piperacillin-tazobactam 4.5 g in dextrose 5 % 100 mL IVPB (ready to mix system) ( Intravenous Verify Only 5/23/22 0200)   sodium chloride 0.9% flush 10 mL (10 mLs Intravenous Given 5/22/22 2300)   melatonin tablet 6 mg (has no administration in time range)   polyethylene glycol packet 17 g (has no administration in time range)   acetaminophen tablet 650 mg (has no administration in time range)   HYDROcodone-acetaminophen 5-325 mg per tablet 1 tablet (has no administration in time range)   naloxone 0.4 mg/mL injection 0.02 mg (has no administration in time range)   magnesium oxide tablet 800 mg (has no administration in time range)   magnesium oxide tablet 800 mg (has no administration in time range)   glucose chewable tablet 16 g (has no administration in time range)   glucose chewable tablet 24 g (has no administration in time range)   glucagon (human recombinant) injection 1 mg (has no administration in time range)   dextrose 10%  bolus 125 mL (has no administration in time range)   dextrose 10% bolus 250 mL (has no administration in time range)   influenza (QUADRIVALENT PF) vaccine 0.5 mL (has no administration in time range)   enoxaparin injection 40 mg (has no administration in time range)   aspirin chewable tablet 81 mg (has no administration in time range)   propofol (DIPRIVAN) 10 mg/mL infusion (5 mcg/kg/min × 92 kg Intravenous Verify Only 5/23/22 0200)   NORepinephrine 4 mg in dextrose 5% 250 mL infusion (premix) (titrating) (0.06 mcg/kg/min × 92 kg Intravenous Verify Only 5/23/22 0200)   etomidate injection 20 mg (20 mg Intravenous Given 5/22/22 1702)   rocuronium injection 70 mg ( Intravenous Canceled Entry 5/22/22 1815)   cefTRIAXone (ROCEPHIN) 2 g/50 mL D5W IVPB (0 g Intravenous Stopped 5/22/22 1829)   azithromycin 500 mg in dextrose 5 % 250 mL IVPB (ready to mix system) (0 mg Intravenous Stopped 5/22/22 1928)   lactated ringers bolus 2,448 mL (0 mL/kg × 81.6 kg Intravenous Stopped 5/22/22 1821)   lactated ringers bolus 1,000 mL (0 mLs Intravenous Stopped 5/22/22 1940)   lactated ringers bolus 500 mL (0 mLs Intravenous Stopped 5/23/22 0008)       Current Discharge Medication List                  Scribe Attestation:   Scribe #1: I performed the above scribed service and the documentation accurately describes the services I performed. I attest to the accuracy of the note.     Attending:   Physician Attestation Statement for Scribe #1: I, Erickson Arana MD, personally performed the services described in this documentation, as scribed by Mildred Wetzel, in my presence, and it is both accurate and complete.       Scribe Attestation:   Scribe #1: I performed the above scribed service and the documentation accurately describes the services I performed. I attest to the accuracy of the note.    Attending Attestation:           Physician Attestation for Scribe:  Physician Attestation Statement for Scribe #1: I, Erickson Arana MD,  reviewed documentation, as scribed by Mildred Wetzel in my presence, and it is both accurate and complete.              Clinical Impression       ICD-10-CM ICD-9-CM   1. Non-traumatic rhabdomyolysis  M62.82 728.88   2. Tachycardia  R00.0 785.0   3. Encounter for feeding tube placement  Z46.59 V53.59   4. Endotracheally intubated  Z97.8 V49.89   5. Hyperthermia  R50.9 780.60   6. Suicide attempt  T14.91XA E958.9   7. Arrhythmia  I49.9 427.9       Disposition:   Disposition: Admitted  Condition: Efrem Arana MD  05/23/22 6830

## 2022-05-23 PROBLEM — Z99.11 ON MECHANICALLY ASSISTED VENTILATION: Status: ACTIVE | Noted: 2022-05-23

## 2022-05-23 PROBLEM — I50.41 ACUTE COMBINED SYSTOLIC AND DIASTOLIC CONGESTIVE HEART FAILURE: Status: ACTIVE | Noted: 2022-05-23

## 2022-05-23 PROBLEM — E87.6 HYPOKALEMIA: Status: ACTIVE | Noted: 2022-05-23

## 2022-05-23 PROBLEM — N17.9 AKI (ACUTE KIDNEY INJURY): Status: ACTIVE | Noted: 2022-05-23

## 2022-05-23 PROBLEM — J69.0 ASPIRATION PNEUMONIA: Status: ACTIVE | Noted: 2022-05-23

## 2022-05-23 PROBLEM — J96.01 ACUTE HYPOXEMIC RESPIRATORY FAILURE: Status: ACTIVE | Noted: 2022-05-23

## 2022-05-23 PROBLEM — T50.902A INTENTIONAL DRUG OVERDOSE: Status: ACTIVE | Noted: 2022-05-23

## 2022-05-23 LAB
ALLENS TEST: ABNORMAL
ALLENS TEST: ABNORMAL
ANION GAP SERPL CALC-SCNC: 15 MMOL/L (ref 8–16)
ANION GAP SERPL CALC-SCNC: 8 MMOL/L (ref 8–16)
ANION GAP SERPL CALC-SCNC: 9 MMOL/L (ref 8–16)
AORTIC ROOT ANNULUS: 4.07 CM
ASCENDING AORTA: 3.13 CM
AV INDEX (PROSTH): 0.94
AV MEAN GRADIENT: 3 MMHG
AV PEAK GRADIENT: 5 MMHG
AV VALVE AREA: 4.41 CM2
AV VELOCITY RATIO: 0.82
BASOPHILS # BLD AUTO: 0.03 K/UL (ref 0–0.2)
BASOPHILS NFR BLD: 0.2 % (ref 0–1.9)
BSA FOR ECHO PROCEDURE: 2.1 M2
BUN SERPL-MCNC: 14 MG/DL (ref 8–23)
BUN SERPL-MCNC: 15 MG/DL (ref 8–23)
BUN SERPL-MCNC: 15 MG/DL (ref 8–23)
CALCIUM SERPL-MCNC: 8.4 MG/DL (ref 8.7–10.5)
CALCIUM SERPL-MCNC: 8.4 MG/DL (ref 8.7–10.5)
CALCIUM SERPL-MCNC: 9.1 MG/DL (ref 8.7–10.5)
CHLORIDE SERPL-SCNC: 106 MMOL/L (ref 95–110)
CHLORIDE SERPL-SCNC: 107 MMOL/L (ref 95–110)
CHLORIDE SERPL-SCNC: 111 MMOL/L (ref 95–110)
CK SERPL-CCNC: 1123 U/L (ref 20–200)
CO2 SERPL-SCNC: 20 MMOL/L (ref 23–29)
CO2 SERPL-SCNC: 25 MMOL/L (ref 23–29)
CO2 SERPL-SCNC: 27 MMOL/L (ref 23–29)
CREAT SERPL-MCNC: 1 MG/DL (ref 0.5–1.4)
CREAT SERPL-MCNC: 1.1 MG/DL (ref 0.5–1.4)
CREAT SERPL-MCNC: 1.3 MG/DL (ref 0.5–1.4)
CV ECHO LV RWT: 0.57 CM
DELSYS: ABNORMAL
DELSYS: ABNORMAL
DIFFERENTIAL METHOD: ABNORMAL
DOP CALC AO PEAK VEL: 1.17 M/S
DOP CALC AO VTI: 20.1 CM
DOP CALC LVOT AREA: 4.7 CM2
DOP CALC LVOT DIAMETER: 2.45 CM
DOP CALC LVOT PEAK VEL: 0.96 M/S
DOP CALC LVOT STROKE VOLUME: 88.58 CM3
DOP CALC RVOT PEAK VEL: 0.6 M/S
DOP CALC RVOT VTI: 10 CM
DOP CALCLVOT PEAK VEL VTI: 18.8 CM
E WAVE DECELERATION TIME: 283.37 MSEC
E/A RATIO: 0.78
E/E' RATIO: 8.56 M/S
ECHO LV POSTERIOR WALL: 1.5 CM (ref 0.6–1.1)
EJECTION FRACTION: 15 %
EOSINOPHIL # BLD AUTO: 0 K/UL (ref 0–0.5)
EOSINOPHIL NFR BLD: 0.1 % (ref 0–8)
ERYTHROCYTE [DISTWIDTH] IN BLOOD BY AUTOMATED COUNT: 13.8 % (ref 11.5–14.5)
ERYTHROCYTE [SEDIMENTATION RATE] IN BLOOD BY WESTERGREN METHOD: 20 MM/H
ERYTHROCYTE [SEDIMENTATION RATE] IN BLOOD BY WESTERGREN METHOD: 24 MM/H
EST. GFR  (AFRICAN AMERICAN): >60 ML/MIN/1.73 M^2
EST. GFR  (NON AFRICAN AMERICAN): 58 ML/MIN/1.73 M^2
EST. GFR  (NON AFRICAN AMERICAN): >60 ML/MIN/1.73 M^2
EST. GFR  (NON AFRICAN AMERICAN): >60 ML/MIN/1.73 M^2
FIO2: 40
FIO2: 50
FRACTIONAL SHORTENING: 19 % (ref 28–44)
GIANT PLATELETS BLD QL SMEAR: PRESENT
GLUCOSE SERPL-MCNC: 158 MG/DL (ref 70–110)
GLUCOSE SERPL-MCNC: 181 MG/DL (ref 70–110)
GLUCOSE SERPL-MCNC: 95 MG/DL (ref 70–110)
HCO3 UR-SCNC: 22.2 MMOL/L (ref 24–28)
HCO3 UR-SCNC: 25.9 MMOL/L (ref 24–28)
HCT VFR BLD AUTO: 35.2 % (ref 40–54)
HGB BLD-MCNC: 12.5 G/DL (ref 14–18)
IMM GRANULOCYTES # BLD AUTO: 0.1 K/UL (ref 0–0.04)
IMM GRANULOCYTES NFR BLD AUTO: 0.7 % (ref 0–0.5)
INTERVENTRICULAR SEPTUM: 1.41 CM (ref 0.6–1.1)
IVC DIAMETER: 2.14 CM
IVRT: 94.2 MSEC
LA MAJOR: 5.08 CM
LA MINOR: 4.97 CM
LA WIDTH: 2.5 CM
LACTATE SERPL-SCNC: 1.9 MMOL/L (ref 0.5–2.2)
LACTATE SERPL-SCNC: 4.4 MMOL/L (ref 0.5–2.2)
LEFT ATRIUM SIZE: 2.62 CM
LEFT ATRIUM VOLUME INDEX: 13.6 ML/M2
LEFT ATRIUM VOLUME: 27.97 CM3
LEFT INTERNAL DIMENSION IN SYSTOLE: 4.25 CM (ref 2.1–4)
LEFT VENTRICLE DIASTOLIC VOLUME INDEX: 65.3 ML/M2
LEFT VENTRICLE DIASTOLIC VOLUME: 133.86 ML
LEFT VENTRICLE MASS INDEX: 163 G/M2
LEFT VENTRICLE SYSTOLIC VOLUME INDEX: 39.4 ML/M2
LEFT VENTRICLE SYSTOLIC VOLUME: 80.83 ML
LEFT VENTRICULAR INTERNAL DIMENSION IN DIASTOLE: 5.27 CM (ref 3.5–6)
LEFT VENTRICULAR MASS: 334.22 G
LV LATERAL E/E' RATIO: 9.63 M/S
LV SEPTAL E/E' RATIO: 7.7 M/S
LVOT MG: 2.15 MMHG
LVOT MV: 0.7 CM/S
LYMPHOCYTES # BLD AUTO: 2 K/UL (ref 1–4.8)
LYMPHOCYTES NFR BLD: 14 % (ref 18–48)
MAGNESIUM SERPL-MCNC: 1.5 MG/DL (ref 1.6–2.6)
MAGNESIUM SERPL-MCNC: 1.5 MG/DL (ref 1.6–2.6)
MAGNESIUM SERPL-MCNC: 2.5 MG/DL (ref 1.6–2.6)
MCH RBC QN AUTO: 31.3 PG (ref 27–31)
MCHC RBC AUTO-ENTMCNC: 35.5 G/DL (ref 32–36)
MCV RBC AUTO: 88 FL (ref 82–98)
MODE: ABNORMAL
MODE: ABNORMAL
MONOCYTES # BLD AUTO: 0.9 K/UL (ref 0.3–1)
MONOCYTES NFR BLD: 6.4 % (ref 4–15)
MV PEAK A VEL: 0.99 M/S
MV PEAK E VEL: 0.77 M/S
MV STENOSIS PRESSURE HALF TIME: 82.18 MS
MV VALVE AREA P 1/2 METHOD: 2.68 CM2
NEUTROPHILS # BLD AUTO: 11.1 K/UL (ref 1.8–7.7)
NEUTROPHILS NFR BLD: 78.6 % (ref 38–73)
NRBC BLD-RTO: 0 /100 WBC
OVALOCYTES BLD QL SMEAR: ABNORMAL
PCO2 BLDA: 28.9 MMHG (ref 35–45)
PCO2 BLDA: 32 MMHG (ref 35–45)
PEEP: 5
PEEP: 5
PH SMN: 7.49 [PH] (ref 7.35–7.45)
PH SMN: 7.52 [PH] (ref 7.35–7.45)
PHOSPHATE SERPL-MCNC: 1.8 MG/DL (ref 2.7–4.5)
PHOSPHATE SERPL-MCNC: 2 MG/DL (ref 2.7–4.5)
PISA TR MAX VEL: 1.69 M/S
PLATELET # BLD AUTO: 151 K/UL (ref 150–450)
PLATELET BLD QL SMEAR: ABNORMAL
PMV BLD AUTO: 9.1 FL (ref 9.2–12.9)
PO2 BLDA: 111 MMHG (ref 80–100)
PO2 BLDA: 74 MMHG (ref 80–100)
POC BE: -1 MMOL/L
POC BE: 3 MMOL/L
POC SATURATED O2: 96 % (ref 95–100)
POC SATURATED O2: 99 % (ref 95–100)
POCT GLUCOSE: 105 MG/DL (ref 70–110)
POCT GLUCOSE: 108 MG/DL (ref 70–110)
POTASSIUM SERPL-SCNC: 3 MMOL/L (ref 3.5–5.1)
POTASSIUM SERPL-SCNC: 3.6 MMOL/L (ref 3.5–5.1)
POTASSIUM SERPL-SCNC: 3.7 MMOL/L (ref 3.5–5.1)
PV MEAN GRADIENT: 0.72 MMHG
RA MAJOR: 4.94 CM
RA WIDTH: 4.21 CM
RBC # BLD AUTO: 4 M/UL (ref 4.6–6.2)
SAMPLE: ABNORMAL
SAMPLE: ABNORMAL
SINUS: 3.58 CM
SITE: ABNORMAL
SITE: ABNORMAL
SODIUM SERPL-SCNC: 142 MMOL/L (ref 136–145)
SODIUM SERPL-SCNC: 142 MMOL/L (ref 136–145)
SODIUM SERPL-SCNC: 144 MMOL/L (ref 136–145)
STJ: 2.96 CM
TDI LATERAL: 0.08 M/S
TDI SEPTAL: 0.1 M/S
TDI: 0.09 M/S
TR MAX PG: 11 MMHG
TRICUSPID ANNULAR PLANE SYSTOLIC EXCURSION: 1.44 CM
TROPONIN I SERPL DL<=0.01 NG/ML-MCNC: 0.17 NG/ML (ref 0–0.03)
TROPONIN I SERPL DL<=0.01 NG/ML-MCNC: 0.18 NG/ML (ref 0–0.03)
VT: 440
VT: 440
WBC # BLD AUTO: 14.12 K/UL (ref 3.9–12.7)

## 2022-05-23 PROCEDURE — 83735 ASSAY OF MAGNESIUM: CPT | Mod: 91 | Performed by: INTERNAL MEDICINE

## 2022-05-23 PROCEDURE — 80048 BASIC METABOLIC PNL TOTAL CA: CPT | Performed by: INTERNAL MEDICINE

## 2022-05-23 PROCEDURE — 25000003 PHARM REV CODE 250: Performed by: INTERNAL MEDICINE

## 2022-05-23 PROCEDURE — 87106 FUNGI IDENTIFICATION YEAST: CPT | Performed by: NURSE PRACTITIONER

## 2022-05-23 PROCEDURE — 63600175 PHARM REV CODE 636 W HCPCS: Performed by: INTERNAL MEDICINE

## 2022-05-23 PROCEDURE — 99900035 HC TECH TIME PER 15 MIN (STAT)

## 2022-05-23 PROCEDURE — 63600175 PHARM REV CODE 636 W HCPCS: Performed by: NURSE PRACTITIONER

## 2022-05-23 PROCEDURE — 36600 WITHDRAWAL OF ARTERIAL BLOOD: CPT

## 2022-05-23 PROCEDURE — 94003 VENT MGMT INPAT SUBQ DAY: CPT

## 2022-05-23 PROCEDURE — 87070 CULTURE OTHR SPECIMN AEROBIC: CPT | Performed by: NURSE PRACTITIONER

## 2022-05-23 PROCEDURE — 80048 BASIC METABOLIC PNL TOTAL CA: CPT | Mod: 91 | Performed by: INTERNAL MEDICINE

## 2022-05-23 PROCEDURE — 83735 ASSAY OF MAGNESIUM: CPT | Performed by: INTERNAL MEDICINE

## 2022-05-23 PROCEDURE — 84100 ASSAY OF PHOSPHORUS: CPT | Mod: 91 | Performed by: INTERNAL MEDICINE

## 2022-05-23 PROCEDURE — 83605 ASSAY OF LACTIC ACID: CPT | Performed by: INTERNAL MEDICINE

## 2022-05-23 PROCEDURE — 87205 SMEAR GRAM STAIN: CPT | Performed by: NURSE PRACTITIONER

## 2022-05-23 PROCEDURE — 20000000 HC ICU ROOM

## 2022-05-23 PROCEDURE — 99291 PR CRITICAL CARE, E/M 30-74 MINUTES: ICD-10-PCS | Mod: ,,, | Performed by: NURSE PRACTITIONER

## 2022-05-23 PROCEDURE — 99900026 HC AIRWAY MAINTENANCE (STAT)

## 2022-05-23 PROCEDURE — 99291 PR CRITICAL CARE, E/M 30-74 MINUTES: ICD-10-PCS | Mod: 25,,, | Performed by: INTERNAL MEDICINE

## 2022-05-23 PROCEDURE — 85025 COMPLETE CBC W/AUTO DIFF WBC: CPT | Performed by: INTERNAL MEDICINE

## 2022-05-23 PROCEDURE — 84100 ASSAY OF PHOSPHORUS: CPT | Performed by: INTERNAL MEDICINE

## 2022-05-23 PROCEDURE — 99291 CRITICAL CARE FIRST HOUR: CPT | Mod: 25,,, | Performed by: INTERNAL MEDICINE

## 2022-05-23 PROCEDURE — 27000221 HC OXYGEN, UP TO 24 HOURS

## 2022-05-23 PROCEDURE — 82803 BLOOD GASES ANY COMBINATION: CPT

## 2022-05-23 PROCEDURE — 25000003 PHARM REV CODE 250: Performed by: EMERGENCY MEDICINE

## 2022-05-23 PROCEDURE — 25500020 PHARM REV CODE 255: Performed by: INTERNAL MEDICINE

## 2022-05-23 PROCEDURE — 36573 INSJ PICC RS&I 5 YR+: CPT

## 2022-05-23 PROCEDURE — C1751 CATH, INF, PER/CENT/MIDLINE: HCPCS

## 2022-05-23 PROCEDURE — 84484 ASSAY OF TROPONIN QUANT: CPT | Mod: 91 | Performed by: INTERNAL MEDICINE

## 2022-05-23 PROCEDURE — 83605 ASSAY OF LACTIC ACID: CPT | Mod: 91 | Performed by: NURSE PRACTITIONER

## 2022-05-23 PROCEDURE — 99291 CRITICAL CARE FIRST HOUR: CPT | Mod: ,,, | Performed by: NURSE PRACTITIONER

## 2022-05-23 PROCEDURE — A4216 STERILE WATER/SALINE, 10 ML: HCPCS | Performed by: INTERNAL MEDICINE

## 2022-05-23 PROCEDURE — 82550 ASSAY OF CK (CPK): CPT | Performed by: INTERNAL MEDICINE

## 2022-05-23 PROCEDURE — 25000003 PHARM REV CODE 250: Performed by: NURSE PRACTITIONER

## 2022-05-23 RX ORDER — CHLORHEXIDINE GLUCONATE ORAL RINSE 1.2 MG/ML
15 SOLUTION DENTAL 2 TIMES DAILY
Status: DISCONTINUED | OUTPATIENT
Start: 2022-05-23 | End: 2022-05-29 | Stop reason: HOSPADM

## 2022-05-23 RX ORDER — NOREPINEPHRINE BITARTRATE/D5W 4MG/250ML
0-1 PLASTIC BAG, INJECTION (ML) INTRAVENOUS CONTINUOUS
Status: DISCONTINUED | OUTPATIENT
Start: 2022-05-23 | End: 2022-05-23

## 2022-05-23 RX ORDER — PROPOFOL 10 MG/ML
5 INJECTION, EMULSION INTRAVENOUS CONTINUOUS
Status: DISCONTINUED | OUTPATIENT
Start: 2022-05-23 | End: 2022-05-23

## 2022-05-23 RX ORDER — FAMOTIDINE 20 MG/1
20 TABLET, FILM COATED ORAL 2 TIMES DAILY
Status: DISCONTINUED | OUTPATIENT
Start: 2022-05-23 | End: 2022-05-29 | Stop reason: HOSPADM

## 2022-05-23 RX ORDER — MAGNESIUM SULFATE HEPTAHYDRATE 40 MG/ML
4 INJECTION, SOLUTION INTRAVENOUS ONCE
Status: COMPLETED | OUTPATIENT
Start: 2022-05-23 | End: 2022-05-23

## 2022-05-23 RX ORDER — CARVEDILOL 3.12 MG/1
3.12 TABLET ORAL 2 TIMES DAILY
Status: DISCONTINUED | OUTPATIENT
Start: 2022-05-23 | End: 2022-05-29 | Stop reason: HOSPADM

## 2022-05-23 RX ORDER — ATORVASTATIN CALCIUM 40 MG/1
40 TABLET, FILM COATED ORAL NIGHTLY
Status: DISCONTINUED | OUTPATIENT
Start: 2022-05-23 | End: 2022-05-29 | Stop reason: HOSPADM

## 2022-05-23 RX ORDER — MUPIROCIN 20 MG/G
OINTMENT TOPICAL 2 TIMES DAILY
Status: DISPENSED | OUTPATIENT
Start: 2022-05-23 | End: 2022-05-28

## 2022-05-23 RX ORDER — FAMOTIDINE 10 MG/ML
20 INJECTION INTRAVENOUS 2 TIMES DAILY
Status: DISCONTINUED | OUTPATIENT
Start: 2022-05-23 | End: 2022-05-23

## 2022-05-23 RX ORDER — SODIUM,POTASSIUM PHOSPHATES 280-250MG
2 POWDER IN PACKET (EA) ORAL 3 TIMES DAILY
Status: DISCONTINUED | OUTPATIENT
Start: 2022-05-23 | End: 2022-05-24

## 2022-05-23 RX ORDER — INSULIN ASPART 100 [IU]/ML
1-10 INJECTION, SOLUTION INTRAVENOUS; SUBCUTANEOUS EVERY 6 HOURS PRN
Status: DISCONTINUED | OUTPATIENT
Start: 2022-05-23 | End: 2022-05-24

## 2022-05-23 RX ADMIN — FAMOTIDINE 20 MG: 20 TABLET ORAL at 08:05

## 2022-05-23 RX ADMIN — Medication 10 ML: at 06:05

## 2022-05-23 RX ADMIN — Medication 0.02 MCG/KG/MIN: at 12:05

## 2022-05-23 RX ADMIN — PIPERACILLIN SODIUM AND TAZOBACTAM SODIUM 4.5 G: 4; .5 INJECTION, POWDER, LYOPHILIZED, FOR SOLUTION INTRAVENOUS at 02:05

## 2022-05-23 RX ADMIN — POTASSIUM BICARBONATE 30 MEQ: 391 TABLET, EFFERVESCENT ORAL at 09:05

## 2022-05-23 RX ADMIN — FAMOTIDINE 20 MG: 10 INJECTION, SOLUTION INTRAVENOUS at 08:05

## 2022-05-23 RX ADMIN — CHLORHEXIDINE GLUCONATE 0.12% ORAL RINSE 15 ML: 1.2 LIQUID ORAL at 08:05

## 2022-05-23 RX ADMIN — Medication 10 ML: at 02:05

## 2022-05-23 RX ADMIN — PIPERACILLIN SODIUM AND TAZOBACTAM SODIUM 4.5 G: 4; .5 INJECTION, POWDER, LYOPHILIZED, FOR SOLUTION INTRAVENOUS at 09:05

## 2022-05-23 RX ADMIN — Medication 10 ML: at 09:05

## 2022-05-23 RX ADMIN — ATORVASTATIN CALCIUM 40 MG: 40 TABLET, FILM COATED ORAL at 08:05

## 2022-05-23 RX ADMIN — SODIUM BICARBONATE: 84 INJECTION, SOLUTION INTRAVENOUS at 06:05

## 2022-05-23 RX ADMIN — SACUBITRIL AND VALSARTAN 1 TABLET: 24; 26 TABLET, FILM COATED ORAL at 08:05

## 2022-05-23 RX ADMIN — PIPERACILLIN SODIUM AND TAZOBACTAM SODIUM 4.5 G: 4; .5 INJECTION, POWDER, LYOPHILIZED, FOR SOLUTION INTRAVENOUS at 06:05

## 2022-05-23 RX ADMIN — ASPIRIN 81 MG CHEWABLE TABLET 81 MG: 81 TABLET CHEWABLE at 08:05

## 2022-05-23 RX ADMIN — CARVEDILOL 3.12 MG: 3.12 TABLET, FILM COATED ORAL at 08:05

## 2022-05-23 RX ADMIN — POTASSIUM BICARBONATE 30 MEQ: 391 TABLET, EFFERVESCENT ORAL at 07:05

## 2022-05-23 RX ADMIN — Medication 2 PACKET: at 03:05

## 2022-05-23 RX ADMIN — MAGNESIUM SULFATE 4 G: 2 INJECTION INTRAVENOUS at 08:05

## 2022-05-23 RX ADMIN — PROPOFOL 5 MCG/KG/MIN: 10 INJECTION, EMULSION INTRAVENOUS at 12:05

## 2022-05-23 RX ADMIN — HUMAN ALBUMIN MICROSPHERES AND PERFLUTREN 0.11 MG: 10; .22 INJECTION, SOLUTION INTRAVENOUS at 11:05

## 2022-05-23 RX ADMIN — Medication 2 PACKET: at 08:05

## 2022-05-23 RX ADMIN — PIPERACILLIN SODIUM AND TAZOBACTAM SODIUM 4.5 G: 4; .5 INJECTION, POWDER, LYOPHILIZED, FOR SOLUTION INTRAVENOUS at 12:05

## 2022-05-23 RX ADMIN — ENOXAPARIN SODIUM 40 MG: 40 INJECTION SUBCUTANEOUS at 05:05

## 2022-05-23 RX ADMIN — MUPIROCIN: 20 OINTMENT TOPICAL at 08:05

## 2022-05-23 NOTE — CONSULTS
[facility] - Intensive Care (Hospital)  Cardiology  Consult Note    Patient Name: Tristin Saha  MRN: [id number]  Admission Date: 5/22/2022  Hospital Length of Stay: 1 days  Code Status: Full Code   Attending Provider: Sadi Laurent MD   Consulting Provider: John Yen Md, MD  Primary Care Physician: Shari Colon MD  Principal Problem:Intentional drug overdose    Patient information was obtained from patient, past medical records, ER records and primary team.     Inpatient consult to Cardiology  Consult performed by: John Yen MD  Consult ordered by: Sadi Laurent MD  Reason for consult: CHF, elevated troponim        Subjective:     Chief Complaint:  AMS     HPI:   Note from chart: HPI:  5/22/2022, 5:13 PM  History obtained from the [facility]     61 y/o M with PMH of bipolar, schizophrenia with prior suicide attempts here with suspected overdose. He asked the wife to stay at another house and when she got home, she found him unconscious with evidence of recent vomiting .EMS noted  noted rapid breathing, hot environment, and patient only responding to painful stimuli. He had an empty bottle of Risperdal next to him.  In the ED,  he was covered in vomit, gurgling respirations, and only localizing to pain, grunting, and not opening his eyes. He was breathing fast, in the 30's. Sats on bag valve mask were in the mid 90's. Temp was 103 ºF rectally.   Intubated for airway protection.   Lab and imaging test reviewed.  Component      Latest Ref Rng & Units 5/23/2022 5/22/2022   POC PH      7.35 - 7.45 7.494 (H) 7.278 (LL)   POC PCO2      35 - 45 mmHg 28.9 (LL) 43.9   POC PO2      80 - 100 mmHg 111 (H) 223 (H)      CT head with no acute findings. XR chest with possible aspiration.  ED work up -  He was acidotic, QRS was >100, we started bicarb at 250 cc/hr, and gave fluid bolus. Spoke with poison control, no recommendations for dantrolene as he was not having rigitity.   He will be admitted to the ICU.      Overview/Hospital Course:  Admitted to ICU overnight on mechanical ventilation for further management of intentional drug overdose. Empty bottle of Risperdal was found at home. QTc 428 on EKG. CK 1138>1123, troponin 0.050>0.184. Lactic acid normalized 1.9>4.4>3.9. Currently requiring minimal vent support.      5/23-Extubated today after successful SAT/SBT. Somnolent , but awaken easily, oriented to self and person. Pt verbalized taking 8 pills of Risperdal intentionally. Will consult Tele-Psych today.  WBC 14K, Hgb 12.5, Plt 151. Troponin bumped to 0.184. Will get an echocardiogram. Continue ASA, empiric  antibiotic targeting aspiration pneumonia. Creatinine improved to normal 1.0>1.6. Metabolic acidosis resolved.       Cardiology consulted for CHF, elevated troponin. Echo with new CMP EF 15 with WMA, and moderate RV function reduction. Discussed with primary team regarding OMT, ischemic workup once patient is stable from ICU, and may need Lifevest if agrees. At bedside today patient is somewhat lethargic/confused unable to describe symptoms but reports having CP/SOB for a while now.       Results for orders placed during the hospital encounter of 05/22/22    Echo    Interpretation Summary  · The left ventricle is mildly enlarged with moderate concentric hypertrophy and severely decreased systolic function.  · The estimated ejection fraction is 15%.  · Grade I left ventricular diastolic dysfunction.  · There are segmental left ventricular wall motion abnormalities.  · Normal right ventricular size with moderately reduced right ventricular systolic function.  · Mild tricuspid regurgitation.  · Mechanically ventilated; cannot use inferior caval vein diameter to estimate central venous pressure.  · The aortic root is mildly dilated.        Past Medical History:   Diagnosis Date    Depression        No past surgical history on file.    Review of patient's allergies indicates:   Allergen Reactions    Haldol  [haloperidol lactate]      Shaking      Lamictal [lamotrigine]     Trileptal [oxcarbazepine]        No current facility-administered medications on file prior to encounter.     Current Outpatient Medications on File Prior to Encounter   Medication Sig    busPIRone (BUSPAR) 15 MG tablet Take 1 tablet (15 mg total) by mouth 2 (two) times daily.    diclofenac sodium 1 % Gel Apply topically 3 (three) times daily as needed (neck/back/arm pain). 10 g tube ok    gabapentin (NEURONTIN) 300 MG capsule Take 2 capsules (600 mg total) by mouth 3 (three) times daily.    lithium (LITHOBID) 300 MG CR tablet Take 2 tablets (600 mg total) by mouth every 12 (twelve) hours.    nicotine (NICODERM CQ) 21 mg/24 hr Place 1 patch onto the skin once daily.    risperidone (RISPERDAL) 4 MG tablet Take 1 tablet (4 mg total) by mouth every evening.     Family History    None       Tobacco Use    Smoking status: Current Some Day Smoker    Smokeless tobacco: Not on file   Substance and Sexual Activity    Alcohol use: Not on file    Drug use: Not on file    Sexual activity: Not on file     Review of Systems   Unable to perform ROS: Acuity of condition   Objective:     Vital Signs (Most Recent):  Temp: 99.1 °F (37.3 °C) (05/23/22 1115)  Pulse: 90 (05/23/22 1400)  Resp: (!) 22 (05/23/22 1400)  BP: 112/64 (05/23/22 1400)  SpO2: 95 % (05/23/22 1400)   Vital Signs (24h Range):  Temp:  [97.1 °F (36.2 °C)-103.7 °F (39.8 °C)] 99.1 °F (37.3 °C)  Pulse:  [] 90  Resp:  [11-40] 22  SpO2:  [93 %-100 %] 95 %  BP: ()/(48-83) 112/64     Weight: 91.6 kg (202 lb)  Body mass index is 30.71 kg/m².    SpO2: 95 %  O2 Device (Oxygen Therapy): ventilator      Intake/Output Summary (Last 24 hours) at 5/23/2022 1440  Last data filed at 5/23/2022 1300  Gross per 24 hour   Intake 7155.48 ml   Output 3125 ml   Net 4030.48 ml       Lines/Drains/Airways       Peripherally Inserted Central Catheter Line  Duration             PICC Double Lumen 05/23/22  0425 left basilic <1 day              Peripheral Intravenous Line  Duration                  Peripheral IV - Single Lumen 05/22/22 1710 20 G Right Antecubital <1 day         Peripheral IV - Single Lumen 05/22/22 1815 20 G Right;Posterior Hand <1 day         Peripheral IV - Single Lumen 05/22/22 1830 22 G Right Forearm <1 day         Peripheral IV - Single Lumen 05/23/22 0030 20 G Left;Posterior Hand <1 day                    Physical Exam  Vitals and nursing note reviewed.   Constitutional:       General: He is not in acute distress.     Appearance: He is well-developed. He is ill-appearing. He is not diaphoretic.   HENT:      Head: Normocephalic and atraumatic.      Nose: Nose normal.   Eyes:      General: No scleral icterus.     Conjunctiva/sclera: Conjunctivae normal.   Neck:      Thyroid: No thyromegaly.      Vascular: No JVD.   Cardiovascular:      Rate and Rhythm: Normal rate and regular rhythm.      Heart sounds: S1 normal and S2 normal. No murmur heard.    No friction rub. No gallop. No S3 or S4 sounds.   Pulmonary:      Effort: Pulmonary effort is normal. No respiratory distress.      Breath sounds: Normal breath sounds. No stridor. No wheezing or rales.   Chest:      Chest wall: No tenderness.   Abdominal:      General: Bowel sounds are normal. There is no distension.      Palpations: Abdomen is soft. There is no mass.      Tenderness: There is no abdominal tenderness. There is no rebound.   Genitourinary:     Comments: Deferred  Musculoskeletal:         General: No tenderness or deformity. Normal range of motion.      Cervical back: Normal range of motion and neck supple.   Lymphadenopathy:      Cervical: No cervical adenopathy.   Skin:     General: Skin is warm and dry.      Coloration: Skin is not pale.      Findings: No erythema or rash.   Neurological:      Mental Status: He is alert and oriented to person, place, and time.      Motor: No abnormal muscle tone.      Coordination: Coordination normal.    Psychiatric:         Attention and Perception: He is inattentive.         Behavior: Behavior is slowed and withdrawn.         Thought Content: Thought content normal.         Cognition and Memory: Memory is impaired.         Judgment: Judgment normal.       Significant Labs: All pertinent lab results from the last 24 hours have been reviewed.    Significant Imaging: Echocardiogram: Transthoracic echo (TTE) complete ([facility] Only):   Results for orders placed or performed during the hospital encounter of 05/22/22   Echo   Result Value Ref Range    BSA 2.1 m2    IVRT [id number] msec    TDI LATERAL 0.08 m/s    Left Ventricular Outflow Tract Mean Gradient 2.15 mmHg    Left Ventricular Outflow Tract Mean Velocity 0.2780759177 cm/s    MV stenosis pressure 1/2 time 82.630102170854224 ms    TR Max Daniel 1.69 m/s    IVC diameter 2.14 cm    Ao root annulus 4.07 cm    Ao peak daniel 1.17 m/s    Posterior Wall 1.50 (A) 0.6 - 1.1 cm    LVOT diameter 2.45 cm    LVOT peak daniel 0.96 m/s    LVOT peak VTI 18.80 cm    E wave deceleration time [id number] msec    MV Peak A Daniel 0.99 m/s    MV Peak E Daniel 0.77 m/s    RA Major Axis 4.94 cm    PV mean gradient 0.72 mmHg    RVOT peak daniel 0.60 m/s    RVOT peak VTI 10.0 cm    IVS 1.41 (A) 0.6 - 1.1 cm    Ao VTI 20.1 cm    AV mean gradient 3 mmHg    LVIDd 5.27 3.5 - 6.0 cm    LVIDs 4.25 (A) 2.1 - 4.0 cm    Left Atrium Major Axis 5.08 cm    Left Atrium Minor Axis 4.97 cm    STJ 2.96 cm    Ascending aorta 3.13 cm    LV Systolic Volume 80.83 mL    LV Diastolic Volume 133.86 mL    TDI SEPTAL 0.10 m/s    LA size 2.62 cm    LV LATERAL E/E' RATIO 9.63 m/s    LV SEPTAL E/E' RATIO 7.70 m/s    FS 19 %    LV mass 334.22 g    Left Ventricle Relative Wall Thickness 0.57 cm    AV valve area 4.41 cm2    AV Velocity Ratio 0.82     AV index (prosthetic) 0.94     MV valve area p 1/2 method 2.68 cm2    E/A ratio 0.78     Mean e' 0.09 m/s    LVOT area 4.7 cm2    LVOT stroke volume 88.58 cm3    AV peak  gradient 5 mmHg    E/E' ratio 8.56 m/s    LV Systolic Volume Index 39.4 mL/m2    LV Diastolic Volume Index 65.30 mL/m2    LV Mass Index 163 g/m2    Triscuspid Valve Regurgitation Peak Gradient 11 mmHg    LA WIDTH 2.50 cm    LA volume 27.97 cm3    Sinus 3.58 cm    TAPSE 1.44 cm    LA Volume Index 13.6 mL/m2    RA Width 4.21 cm    EF 15 %    Narrative    · The left ventricle is mildly enlarged with moderate concentric   hypertrophy and severely decreased systolic function.  · The estimated ejection fraction is 15%.  · Grade I left ventricular diastolic dysfunction.  · There are segmental left ventricular wall motion abnormalities.  · Normal right ventricular size with moderately reduced right ventricular   systolic function.  · Mild tricuspid regurgitation.  · Mechanically ventilated; cannot use inferior caval vein diameter to   estimate central venous pressure.  · The aortic root is mildly dilated.        Assessment and Plan:     * Intentional drug overdose  Cont tx per psych, will need further inpt tx    Acute combined systolic and diastolic congestive heart failure  Newly diagnosed CHF EF 15%  Will need ischemic workup once patient is stable mentally  Start OMT, will discuss Lifevest but likely not candidate if patient cannot process/follow up as needed  Will need CHF clinic nursing education for him and family       Aspiration pneumonia  Cont tx per primary/ICU team    ALFONZO (acute kidney injury)  Renal function improved     Acute hypoxemic respiratory failure  Extubated now , stable     Schizoaffective disorder, bipolar type  Cont tx per primary team/psych       Elevated troponin  - asa, statin, BB; ischemic workup once mental status improves     VTE Risk Mitigation (From admission, onward)         Ordered     enoxaparin injection 40 mg  Daily         05/22/22 2314     IP VTE LOW RISK PATIENT  Once         05/22/22 2149     Place sequential compression device  Until discontinued         05/22/22 2149               Critical Care Time: 60 minutes  Critical secondary to Patient has a condition that poses threat to life and bodily function:     Acute CHF, AMS, ALFONZO, aspiration pneumonia, drug overdose      Critical care was time spent personally by me on the following activities: development of treatment plan with patient or surrogate and bedside caregivers, discussions with consultants, evaluation of patient's response to treatment, examination of patient, ordering and performing treatments and interventions, ordering and review of laboratory studies, ordering and review of cardiac and radiographic studies, telemetry and re-evaluation of patient's condition. This critical care time did not overlap with that of any other provider or involve time for any procedures.      Thank you for your consult. I will follow-up with patient. Please contact us if you have any additional questions.    John Yen Md, MD  Cardiology   '[place] - Intensive Care ([facility])

## 2022-05-23 NOTE — PLAN OF CARE
Recommendation/Intervention: 5/23  1. When medically appropriate, initiate Peptamen Intense VHP:   -Goal rate @ 50mL/hr.   -Provides 1200 calories, 110g protein, and 1008mL H2O.   -Propofol provides 73 calories @ rate of 2.8.   -100mL H2O flush q 4-6 hours or per MD/NP.   -Check Mg, K+, Na, Phos, and Glucose before and during initiation;Correct as indicated.      2. Weekly weights per RD follow up.     Akanksha Zhu RD,LDN

## 2022-05-23 NOTE — ASSESSMENT & PLAN NOTE
ED discussed with poison control, no recommendations for dantrolene as he was not having rigitity.   He is intubated for airway protection  .  Started on bicarbonate drip .  EKG showed qtc of 428

## 2022-05-23 NOTE — SUBJECTIVE & OBJECTIVE
Interval History:  Extubated today. Will consult Tele Psych. Pt is PECed.     Review of Systems   Constitutional:  Positive for activity change and appetite change. Negative for fever.   HENT:  Negative for sore throat.    Eyes:  Negative for visual disturbance.   Respiratory:  Negative for cough, chest tightness and shortness of breath.    Cardiovascular:  Negative for chest pain, palpitations and leg swelling.   Gastrointestinal:  Negative for abdominal distention, abdominal pain, constipation, diarrhea, nausea and vomiting.   Endocrine: Negative for polyuria.   Genitourinary:  Negative for decreased urine volume, dysuria, flank pain, frequency and hematuria.   Musculoskeletal:  Negative for back pain and gait problem.   Skin:  Negative for rash.   Neurological:  Negative for syncope, speech difficulty, weakness, light-headedness and headaches.   Psychiatric/Behavioral:  Positive for confusion (mild). Negative for hallucinations.         Somnolent    Objective:     Vital Signs (Most Recent):  Temp: 99.1 °F (37.3 °C) (05/23/22 1115)  Pulse: 90 (05/23/22 1100)  Resp: (!) 26 (05/23/22 1100)  BP: 98/61 (05/23/22 1100)  SpO2: 97 % (05/23/22 1100)   Vital Signs (24h Range):  Temp:  [97.1 °F (36.2 °C)-103.7 °F (39.8 °C)] 99.1 °F (37.3 °C)  Pulse:  [] 90  Resp:  [11-40] 26  SpO2:  [93 %-100 %] 97 %  BP: ()/(48-83) 98/61     Weight: 91.6 kg (202 lb)  Body mass index is 30.71 kg/m².    Intake/Output Summary (Last 24 hours) at 5/23/2022 1134  Last data filed at 5/23/2022 0658  Gross per 24 hour   Intake 6953.76 ml   Output 2050 ml   Net 4903.76 ml      Physical Exam  Constitutional:       General: He is not in acute distress.     Appearance: He is well-developed. He is not diaphoretic.      Comments: Somnolent , easily aroused    HENT:      Head: Normocephalic and atraumatic.      Mouth/Throat:      Pharynx: No oropharyngeal exudate.   Eyes:      Conjunctiva/sclera: Conjunctivae normal.      Pupils: Pupils are  equal, round, and reactive to light.   Neck:      Thyroid: No thyromegaly.      Vascular: No JVD.   Cardiovascular:      Rate and Rhythm: Normal rate and regular rhythm.      Heart sounds: Normal heart sounds. No murmur heard.  Pulmonary:      Effort: Pulmonary effort is normal. No respiratory distress.      Breath sounds: Normal breath sounds. No wheezing or rales.   Chest:      Chest wall: No tenderness.   Abdominal:      General: Bowel sounds are normal. There is no distension.      Palpations: Abdomen is soft.      Tenderness: There is no abdominal tenderness. There is no guarding or rebound.   Musculoskeletal:         General: Normal range of motion.      Cervical back: Normal range of motion and neck supple.   Lymphadenopathy:      Cervical: No cervical adenopathy.   Skin:     General: Skin is warm and dry.      Findings: No rash.   Neurological:      Mental Status: He is oriented to person, place, and time and easily aroused.      Cranial Nerves: No cranial nerve deficit.      Sensory: No sensory deficit.      Deep Tendon Reflexes: Reflexes normal.       Significant Labs: All pertinent labs within the past 24 hours have been reviewed.  BMP:   Recent Labs   Lab 05/23/22  0545 05/23/22  0806   *  --      --    K 3.0*  --      --    CO2 27  --    BUN 15  --    CREATININE 1.0  --    CALCIUM 8.4*  --    MG 1.5* 1.5*     CBC:   Recent Labs   Lab 05/22/22  1805 05/23/22  0545   WBC 11.42 14.12*   HGB 14.2 12.5*   HCT 40.3 35.2*    151     CMP:   Recent Labs   Lab 05/22/22  1805 05/23/22  0256 05/23/22  0545   * 142 142   K 3.3* 3.6 3.0*   * 107 106   CO2 18* 20* 27   * 181* 158*   BUN 14 15 15   CREATININE 1.6* 1.3 1.0   CALCIUM 8.7 8.4* 8.4*   PROT 6.0  --   --    ALBUMIN 3.2*  --   --    BILITOT 1.3*  --   --    ALKPHOS 41*  --   --    AST 27  --   --    ALT 16  --   --    ANIONGAP 13 15 9   EGFRNONAA 45* 58* >60       Significant Imaging:

## 2022-05-23 NOTE — HOSPITAL COURSE
Admitted to ICU overnight on mechanical vent with low dose pressor support, minimal sedation  5/23 seen and examined sedated on vent at start of shift; echo revealed 15% EF; extubated successfully  5/24 no acute issues after extubation; remains PEC for suicide attempt; cardiology following for elevated tropinin, reduced EF

## 2022-05-23 NOTE — ED NOTES
Pt's wife and daughter at the bedside; Dr. Arana at the bedside obtaining history and providing update as to pt's status.

## 2022-05-23 NOTE — H&P
[facility] - [facility] ([facility]  History & Physical    Patient Name: Tristin Saha  MRN: [id number]  Patient Class: IP- Inpatient  Admission Date: 5/22/2022  Attending Physician: Sadi Laurent MD   Primary Care Provider: Primary Doctor No         Patient information was obtained from past medical records and ER records.     Subjective:     Principal Problem:Acute hypoxemic respiratory failure    Chief Complaint:   Chief Complaint   Patient presents with    Drug Overdose     Pt last known well time 1700 5/21/22. Respiradol bottle found . Pt tachypneic and tachycardic. Incomprehensible moaning and vomitus on mouth         HPI:   5/22/2022, 5:13 PM  History obtained from the [facility]      63 y/o M with PMH of bipolar, schizophrenia with prior suicide attempts here with suspected overdose. He asked the wife to stay at another house and when she got home, she found him unconscious with evidence of recent vomiting .EMS noted  noted rapid breathing, hot environment, and patient only responding to painful stimuli. He had an empty bottle of Risperdal next to him.  In the ED,  he was covered in vomit, gurgling respirations, and only localizing to pain, grunting, and not opening his eyes. He was breathing fast, in the 30's. Sats on bag valve mask were in the mid 90's. Temp was 103 ºF rectally.   Intubated for airway protection.   Lab and imaging test reviewed.  Component      Latest Ref Rng & Units 5/23/2022 5/22/2022   POC PH      7.35 - 7.45 7.494 (H) 7.278 (LL)   POC PCO2      35 - 45 mmHg 28.9 (LL) 43.9   POC PO2      80 - 100 mmHg 111 (H) 223 (H)     CT head with no acute findings. XR chest with possible aspiration.  ED work up -  He was acidotic, QRS was >100, we started bicarb at 250 cc/hr, and gave fluid bolus. Spoke with poison control, no recommendations for dantrolene as he was not having rigitity.   He will be admitted to the ICU.        Past Medical History:   Diagnosis Date    Depression         No past surgical history on file.    Review of patient's allergies indicates:   Allergen Reactions    Haldol [haloperidol lactate]      Shaking      Lamictal [lamotrigine]     Trileptal [oxcarbazepine]        No current facility-administered medications on file prior to encounter.     Current Outpatient Medications on File Prior to Encounter   Medication Sig    busPIRone (BUSPAR) 15 MG tablet Take 1 tablet (15 mg total) by mouth 2 (two) times daily.    diclofenac sodium 1 % Gel Apply topically 3 (three) times daily as needed (neck/back/arm pain). 10 g tube ok    gabapentin (NEURONTIN) 300 MG capsule Take 2 capsules (600 mg total) by mouth 3 (three) times daily.    lithium (LITHOBID) 300 MG CR tablet Take 2 tablets (600 mg total) by mouth every 12 (twelve) hours.    nicotine (NICODERM CQ) 21 mg/24 hr Place 1 patch onto the skin once daily.    risperidone (RISPERDAL) 4 MG tablet Take 1 tablet (4 mg total) by mouth every evening.     Family History    None       Tobacco Use    Smoking status: Current Some Day Smoker    Smokeless tobacco: Not on file   Substance and Sexual Activity    Alcohol use: Not on file    Drug use: Not on file    Sexual activity: Not on file     Review of Systems   Unable to perform ROS: Intubated   Objective:     Vital Signs (Most Recent):  Temp: 98.2 °F (36.8 °C) (05/22/22 2300)  Pulse: 85 (05/23/22 0200)  Resp: 16 (05/23/22 0200)  BP: 100/65 (05/23/22 0200)  SpO2: 99 % (05/23/22 0200) Vital Signs (24h Range):  Temp:  [97.1 °F (36.2 °C)-103.7 °F (39.8 °C)] 98.2 °F (36.8 °C)  Pulse:  [] 85  Resp:  [11-40] 16  SpO2:  [93 %-100 %] 99 %  BP: ()/(48-83) 100/65     Weight: 92 kg (202 lb 13.2 oz)  Body mass index is 30.84 kg/m².    Physical Exam  Vitals and nursing note reviewed.   Constitutional:       Comments: Intubated,sedated    HENT:      Nose: Nose normal.      Mouth/Throat:      Mouth: Mucous membranes are moist.   Eyes:      General: Scleral icterus present.          Right eye: No discharge.         Left eye: No discharge.      Pupils: Pupils are equal, round, and reactive to light.   Cardiovascular:      Rate and Rhythm: Tachycardia present.   Pulmonary:      Comments: Harsh BS bilaterally   Abdominal:      General: There is no distension.      Tenderness: There is no abdominal tenderness.   Skin:     General: Skin is warm.   Neurological:      Comments: Intubated,sedated          CRANIAL NERVES     CN III, IV, VI   Pupils are equal, round, and reactive to light.     Significant Labs: All pertinent labs within the past 24 hours have been reviewed.  BMP:   Recent Labs   Lab 05/22/22  1805   *   *   K 3.3*   *   CO2 18*   BUN 14   CREATININE 1.6*   CALCIUM 8.7     CBC:   Recent Labs   Lab 05/22/22  1805   WBC 11.42   HGB 14.2   HCT 40.3          Significant Imaging: I have reviewed all pertinent imaging results/findings within the past 24 hours.    Assessment/Plan:     * Acute hypoxemic respiratory failure  Patient with Hypoxic Respiratory failure which is Acute.  he is not on home oxygen. Supplemental oxygen was provided and noted- Vent Mode: A/C  Oxygen Concentration (%):  [] 40  Resp Rate Total:  [22 br/min-42 br/min] 24 br/min  Vt Set:  [400 mL-440 mL] 440 mL  PEEP/CPAP:  [5 cmH20] 5 cmH20  Pressure Support:  [0 cmH20] 0 cmH20  Mean Airway Pressure:  [-1.3 qpD72-24 cmH20] 11 cmH20.   Signs/symptoms of respiratory failure include- respiratory distress. Contributing diagnoses includes - Aspiration Labs and images were reviewed. Patient Has recent ABG, which has been reviewed. Will treat underlying causes and adjust management of respiratory failure as follows-   Will continue Zosyn, ventilatory support , critical care follow up     Hypokalemia    Will replete , folow repeat serum BMP and check serum mag     ALFONZO (acute kidney injury)  Will continue hydration , closely monitor serum creatinine .  Follow BMP in AM       Intentional drug  overdose  ED discussed with poison control, no recommendations for dantrolene as he was not having rigitity.   He is intubated for airway protection  .  Started on bicarbonate drip .  EKG showed qtc of 428      Schizoaffective disorder, bipolar type    Will need inpatient psychiatry evaluation    .  He is PECED      VTE Risk Mitigation (From admission, onward)         Ordered     enoxaparin injection 40 mg  Daily         05/22/22 2315     IP VTE LOW RISK PATIENT  Once         05/22/22 2149     Place sequential compression device  Until discontinued         05/22/22 2149                Aspiration pneumonia   Follow tracheal cultures   On Zosyn   -  Critical care time spent on the evaluation and treatment of severe organ dysfunction, review of pertinent labs and imaging studies, discussions with consulting providers and discussions with patient/family:  45 minutes.     Lan Styles MD  Department of Hospital Medicine   [facility] - Intensive Care ([facility])

## 2022-05-23 NOTE — ED NOTES
T/c to pharmacy on return to ED and requested a second infusion of sodium bicarbonate be delivered to ICU.

## 2022-05-23 NOTE — CONSULTS
[facility] - [facility] ([facility])  Critical Care Medicine  Consult Note    Patient Name: Tristin Saha  MRN: [id number]  Admission Date: 5/22/2022  Hospital Length of Stay: 1 days  Code Status: Full Code  Attending Physician: Sadi Laurent MD   Primary Care Provider: Shari Colon MD   Principal Problem: Acute hypoxemic respiratory failure    Subjective:     HPI:  62 year old male with PMH including bipolar disorder; schizophrenia, and prior suicide attempts    Presented to ED via EMS called when spouse found him unconscious with evidence of vomiting. EMS noted rapid breathing, response only to pain, and an empty bottle of risperdal near pt.  Last known normal state near 24 hours prior to ED presentation    Required intubation for airway protection in ED, temp 103 rectally, initial pH 7.278  Labs revealed unremarkable cbc; Na 146; K 3.3; creatinine 1.6; CPK 1138; lactate 3.9  Tylenol, lithium, and salicylate levels all less than measurable   UDS without any positive findings  CXR showed LLL atelectasis and perihilar infiltrates by my interpretation      Hospital/ICU Course:  Admitted to ICU overnight on mechanical vent with low dose pressor support, minimal sedation  5/23 seen and examined sedated on vent at start of shift      Review of Systems   Unable to perform ROS: Intubated      Objective:     Vital Signs (Most Recent):  Temp: 98.2 °F (36.8 °C) (05/22/22 2300)  Pulse: 82 (05/23/22 0503)  Resp: (!) 25 (05/23/22 0503)  BP: 116/60 (05/23/22 0503)  SpO2: 98 % (05/23/22 0503)   Vital Signs (24h Range):  Temp:  [97.1 °F (36.2 °C)-103.7 °F (39.8 °C)] 98.2 °F (36.8 °C)  Pulse:  [] 82  Resp:  [11-40] 25  SpO2:  [93 %-100 %] 98 %  BP: ()/(48-83) 116/60     Weight: 92 kg (202 lb 13.2 oz)  Body mass index is 30.84 kg/m².      Intake/Output Summary (Last 24 hours) at 5/23/2022 0700  Last data filed at 5/23/2022 0658  Gross per 24 hour   Intake 6953.76 ml   Output 2050 ml   Net 4903.76 ml       NORepinephrine bitartrate-D5W      propofoL 5 mcg/kg/min (05/23/22 0600)        Physical Exam  Vitals and nursing note reviewed.   Constitutional:       General: He is not in acute distress.     Appearance: He is overweight. He is ill-appearing.      Interventions: He is sedated, intubated and restrained.   HENT:      Head: Atraumatic.   Eyes:      Conjunctiva/sclera: Conjunctivae normal.      Pupils: Pupils are equal, round, and reactive to light.   Neck:      Vascular: No JVD.   Cardiovascular:      Rate and Rhythm: Normal rate and regular rhythm.      Pulses:           Radial pulses are 2+ on the right side and 2+ on the left side.        Dorsalis pedis pulses are 1+ on the right side and 1+ on the left side.   Pulmonary:      Effort: He is intubated.      Breath sounds: No wheezing, rhonchi or rales.   Abdominal:      General: Bowel sounds are normal. There is no distension.      Palpations: Abdomen is soft.   Musculoskeletal:      Right lower leg: No edema.      Left lower leg: No edema.   Skin:     General: Skin is warm and dry.      Capillary Refill: Capillary refill takes less than 2 seconds.       Vents:  Vent Mode: A/C (05/23/22 0503)  Ventilator Initiated: Yes (05/22/22 1704)  Set Rate: 20 BPM (05/23/22 0503)  Vt Set: 440 mL (05/23/22 0503)  Pressure Support: 0 cmH20 (05/23/22 0503)  PEEP/CPAP: 5 cmH20 (05/23/22 0503)  Oxygen Concentration (%): 40 (05/23/22 0503)  Peak Airway Pressure: 20 cmH2O (05/23/22 0503)  Plateau Pressure: 16 cmH20 (05/23/22 0503)  Total Ve: 10.8 mL (05/23/22 0503)  F/VT Ratio<105 (RSBI): (!) 38.34 (05/23/22 0503)    Lines/Drains/Airways       Peripherally Inserted Central Catheter Line  Duration             PICC Double Lumen 05/23/22 0425 left basilic <1 day              Drain  Duration                  NG/OG Tube 05/22/22 1710 [place] sump 6 Fr. Center mouth <1 day         Urethral Catheter 05/22/22 1820 Silicone 16 Fr. <1 day              Airway  Duration                  Airway -  Non-Surgical 05/22/22 1704 Endotracheal Tube <1 day              Peripheral Intravenous Line  Duration                  Peripheral IV - Single Lumen 05/22/22 1710 20 G Right Antecubital <1 day         Peripheral IV - Single Lumen 05/22/22 1815 20 G Right;Posterior Hand <1 day         Peripheral IV - Single Lumen 05/22/22 1830 22 G Right Forearm <1 day         Peripheral IV - Single Lumen 05/23/22 0030 20 G Left;Posterior Hand <1 day                    Significant Labs:    CBC/Anemia Profile:  Recent Labs   Lab 05/22/22  1805 05/23/22  0545   WBC 11.42 14.12*   HGB 14.2 12.5*   HCT 40.3 35.2*    151   MCV 90 88   RDW 14.0 13.8        Chemistries:  Recent Labs   Lab 05/22/22 1805 05/23/22  0256 05/23/22  0545   * 142 142   K 3.3* 3.6 3.0*   * 107 106   CO2 18* 20* 27   BUN 14 15 15   CREATININE 1.6* 1.3 1.0   CALCIUM 8.7 8.4* 8.4*   ALBUMIN 3.2*  --   --    PROT 6.0  --   --    BILITOT 1.3*  --   --    ALKPHOS 41*  --   --    ALT 16  --   --    AST 27  --   --    MG  --   --  1.5*       All pertinent labs within the past 24 hours have been reviewed.    Significant Imaging:  I have reviewed all pertinent imaging results/findings within the past 24 hours.      ABG  Recent Labs   Lab 05/23/22  0334   PH 7.516*   PO2 74*   PCO2 32.0*   HCO3 25.9   BE 3     Assessment/Plan:     Psychiatric  Intentional drug overdose  Will need inpt psych care on discharge  PEC in place; maintain line of site monitoring once extubated    Schizoaffective disorder, bipolar type  Lithium level not measurable (low); likely non compliance with mental health care plan contributing to suicide attempt    Pulmonary  * Acute hypoxemic respiratory failure  S/t intentional overdose  Vent settings reviewed and adjusted to optimize gas exchange  Daily SAT/SBT for extubation readiness    On mechanically assisted ventilation  VAP prophylaxis    Aspiration pneumonia  Vs pneumonitis  on empiric zosyn, continue today, re-evaluate  daily    Renal/  Hypokalemia  Continue replacement and monitoring    ALFONZO (acute kidney injury)  Resolved with supportive care  Avoid nephrotoxins post insult        Critical Care Daily Checklist:    A: Awake: RASS Goal/Actual Goal: RASS Goal: -1-->drowsy  Actual: [place] Agitation Sedation Scale (RASS): Drowsy   B: Spontaneous Breathing Trial Performed?     C: SAT & SBT Coordinated?  yes                      D: Delirium: CAM-ICU Overall CAM-ICU: Positive   E: Early Mobility Performed? Yes   F: Feeding Goal: Goals: 1. Enteral Nutrition initiation within 24 hours.  Status: Nutrition Goal Status: new   Current Diet Order   Procedures    Diet NPO      AS: Analgesia/Sedation Holding for SAT   T: Thromboembolic Prophylaxis lovenox   H: HOB > 300 Yes   U: Stress Ulcer Prophylaxis (if needed) pepcid   G: Glucose Control monitor   B: Bowel Function     I: Indwelling Catheter (Lines & Fletcher) Necessity reviewed   D: De-escalation of Antimicrobials/Pharmacotherapies reviewed    Plan for the day/ETD As above; extubate if meets criteria    Code Status:  Family/Goals of Care: Full Code  To inpt psych care on discharge   I have discussed case and plan of care in detail with Dr Cosme and Dr Laurent; Status and plan of care were discussed with team on multidisciplinary rounds.    Critical Care Time: 70 minutes  Critical secondary to mechanical ventilation post intentional Risperdal overdose;    Critical care was time spent personally by me on the following activities: development of treatment plan with patient or surrogate and bedside caregivers, discussions with consultants, evaluation of patient's response to treatment, examination of patient, ordering and performing treatments and interventions, ordering and review of laboratory studies, ordering and review of radiographic studies, pulse oximetry, re-evaluation of patient's condition. This critical care time did not overlap with that of any other provider or involve time for  any procedures.    Thank you for your consult.      POLINA Balbuena-BC  Critical Care Medicine  [facility] ([facility])

## 2022-05-23 NOTE — SUBJECTIVE & OBJECTIVE
Past Medical History:   Diagnosis Date    Depression        No past surgical history on file.    Review of patient's allergies indicates:   Allergen Reactions    Haldol [haloperidol lactate]      Shaking      Lamictal [lamotrigine]     Trileptal [oxcarbazepine]        No current facility-administered medications on file prior to encounter.     Current Outpatient Medications on File Prior to Encounter   Medication Sig    busPIRone (BUSPAR) 15 MG tablet Take 1 tablet (15 mg total) by mouth 2 (two) times daily.    diclofenac sodium 1 % Gel Apply topically 3 (three) times daily as needed (neck/back/arm pain). 10 g tube ok    gabapentin (NEURONTIN) 300 MG capsule Take 2 capsules (600 mg total) by mouth 3 (three) times daily.    lithium (LITHOBID) 300 MG CR tablet Take 2 tablets (600 mg total) by mouth every 12 (twelve) hours.    nicotine (NICODERM CQ) 21 mg/24 hr Place 1 patch onto the skin once daily.    risperidone (RISPERDAL) 4 MG tablet Take 1 tablet (4 mg total) by mouth every evening.     Family History    None       Tobacco Use    Smoking status: Current Some Day Smoker    Smokeless tobacco: Not on file   Substance and Sexual Activity    Alcohol use: Not on file    Drug use: Not on file    Sexual activity: Not on file     Review of Systems   Unable to perform ROS: Intubated   Objective:     Vital Signs (Most Recent):  Temp: 98.2 °F (36.8 °C) (05/22/22 2300)  Pulse: 85 (05/23/22 0200)  Resp: 16 (05/23/22 0200)  BP: 100/65 (05/23/22 0200)  SpO2: 99 % (05/23/22 0200) Vital Signs (24h Range):  Temp:  [97.1 °F (36.2 °C)-103.7 °F (39.8 °C)] 98.2 °F (36.8 °C)  Pulse:  [] 85  Resp:  [11-40] 16  SpO2:  [93 %-100 %] 99 %  BP: ()/(48-83) 100/65     Weight: 92 kg (202 lb 13.2 oz)  Body mass index is 30.84 kg/m².    Physical Exam  Vitals and nursing note reviewed.   Constitutional:       Comments: Intubated,sedated    HENT:      Nose: Nose normal.      Mouth/Throat:      Mouth: Mucous membranes are moist.   Eyes:       General: Scleral icterus present.         Right eye: No discharge.         Left eye: No discharge.      Pupils: Pupils are equal, round, and reactive to light.   Cardiovascular:      Rate and Rhythm: Tachycardia present.   Pulmonary:      Comments: Harsh BS bilaterally   Abdominal:      General: There is no distension.      Tenderness: There is no abdominal tenderness.   Skin:     General: Skin is warm.   Neurological:      Comments: Intubated,sedated          CRANIAL NERVES     CN III, IV, VI   Pupils are equal, round, and reactive to light.     Significant Labs: All pertinent labs within the past 24 hours have been reviewed.  BMP:   Recent Labs   Lab 05/22/22  1805   *   *   K 3.3*   *   CO2 18*   BUN 14   CREATININE 1.6*   CALCIUM 8.7     CBC:   Recent Labs   Lab 05/22/22  1805   WBC 11.42   HGB 14.2   HCT 40.3          Significant Imaging: I have reviewed all pertinent imaging results/findings within the past 24 hours.

## 2022-05-23 NOTE — EICU
Eicu brief admission review note.  Pt was examined on video, notes, images, labs  Reviewed.  63 y/o with h/o HTN, schizoaffective disorder with h/o suicidal ideation was found unresponsive at home, empty bottle of respiradol found next to him, in ER febrile at 103 tahycpneic, tachycardic, intubated.   Problems:  Risperidone overdose  Respiratory failure required intubation  Bilateral pneumonia  Lactic acidosis  Possible rhabdomyolysis  NSTEMI, likely type II ischemia  Prolonged QTC  ALFONZO  HypoK  Plan:   Vent settings reviwed, adjust per ABG, on propofol  Zosyn, Zithromax, follow up cxs, procal  Bicarb@ 250 /h, trend CK, strict Ins/Out  K supplementation  Serial EKG, trops, ASA  PEC hold  DVT proph- Lovenox  D/w RN    Bilateral soft wrist restrains ordered for patient's safety to prevent from pulling on lines/tubes. Pt was examined on video prior to the order placement.                 11:07 PM

## 2022-05-23 NOTE — ED NOTES
Pt belongings:  Two socks, two boots, belt, small notepad, [place] ID card, Medicare card, pen, empty bottle labeled Risperidone 1 mg tablet #45, $0.77 in change    Secured at nurse's station; will be brought upstairs with patient upon admission.   Spoke with pt and gave the pt the phone number to activate pt portal.

## 2022-05-23 NOTE — ASSESSMENT & PLAN NOTE
Lithium level not measurable (low); likely non compliance with mental health care plan contributing to suicide attempt

## 2022-05-23 NOTE — PLAN OF CARE
Pt awake and confused. Oriented to self only. Requires frequent redirection.  VSS on 2 liters nasal cannula. Afebrile. Passed bedside swallow exam notified MD. Clear liquid diet started. QiVi in place with adequate urine output. Wife and daughter updated at bedside. Emergency equipment at bedside. All alarms active and audible. Will continue to monitor.  Problem: Infection  Goal: Absence of Infection Signs and Symptoms  Outcome: Ongoing, Progressing     Problem: Skin and Tissue Injury (Mechanical Ventilation, Invasive)  Goal: Absence of Device-Related Skin and Tissue Injury  Outcome: Ongoing, Progressing     Problem: Fall Injury Risk  Goal: Absence of Fall and Fall-Related Injury  Outcome: Ongoing, Progressing     Problem: Fluid and Electrolyte Imbalance (Acute Kidney Injury/Impairment)  Goal: Fluid and Electrolyte Balance  Outcome: Ongoing, Progressing

## 2022-05-23 NOTE — ASSESSMENT & PLAN NOTE
Newly diagnosed CHF EF 15%  Will need ischemic workup once patient is stable mentally  Start OMT, will discuss [facility] but likely not candidate if patient cannot process/follow up as needed  Will need CHF clinic nursing education for him and family

## 2022-05-23 NOTE — SUBJECTIVE & OBJECTIVE
Review of Systems   Unable to perform ROS: Intubated      Objective:     Vital Signs (Most Recent):  Temp: 98.2 °F (36.8 °C) (05/22/22 2300)  Pulse: 82 (05/23/22 0503)  Resp: (!) 25 (05/23/22 0503)  BP: 116/60 (05/23/22 0503)  SpO2: 98 % (05/23/22 0503)   Vital Signs (24h Range):  Temp:  [97.1 °F (36.2 °C)-103.7 °F (39.8 °C)] 98.2 °F (36.8 °C)  Pulse:  [] 82  Resp:  [11-40] 25  SpO2:  [93 %-100 %] 98 %  BP: ()/(48-83) 116/60     Weight: 92 kg (202 lb 13.2 oz)  Body mass index is 30.84 kg/m².      Intake/Output Summary (Last 24 hours) at 5/23/2022 0700  Last data filed at 5/23/2022 0658  Gross per 24 hour   Intake 6953.76 ml   Output 2050 ml   Net 4903.76 ml      NORepinephrine bitartrate-D5W      propofoL 5 mcg/kg/min (05/23/22 0600)        Physical Exam  Vitals and nursing note reviewed.   Constitutional:       General: He is not in acute distress.     Appearance: He is overweight. He is ill-appearing.      Interventions: He is sedated, intubated and restrained.   HENT:      Head: Atraumatic.   Eyes:      Conjunctiva/sclera: Conjunctivae normal.      Pupils: Pupils are equal, round, and reactive to light.   Neck:      Vascular: No JVD.   Cardiovascular:      Rate and Rhythm: Normal rate and regular rhythm.      Pulses:           Radial pulses are 2+ on the right side and 2+ on the left side.        Dorsalis pedis pulses are 1+ on the right side and 1+ on the left side.   Pulmonary:      Effort: He is intubated.      Breath sounds: No wheezing, rhonchi or rales.   Abdominal:      General: Bowel sounds are normal. There is no distension.      Palpations: Abdomen is soft.   Musculoskeletal:      Right lower leg: No edema.      Left lower leg: No edema.   Skin:     General: Skin is warm and dry.      Capillary Refill: Capillary refill takes less than 2 seconds.       Vents:  Vent Mode: A/C (05/23/22 0503)  Ventilator Initiated: Yes (05/22/22 1704)  Set Rate: 20 BPM (05/23/22 0503)  Vt Set: 440 mL  (05/23/22 0503)  Pressure Support: 0 cmH20 (05/23/22 0503)  PEEP/CPAP: 5 cmH20 (05/23/22 0503)  Oxygen Concentration (%): 40 (05/23/22 0503)  Peak Airway Pressure: 20 cmH2O (05/23/22 0503)  Plateau Pressure: 16 cmH20 (05/23/22 0503)  Total Ve: 10.8 mL (05/23/22 0503)  F/VT Ratio<105 (RSBI): (!) 38.34 (05/23/22 0503)    Lines/Drains/Airways       Peripherally Inserted Central Catheter Line  Duration             PICC Double Lumen 05/23/22 0425 left basilic <1 day              Drain  Duration                  NG/OG Tube 05/22/22 1710 Slope sump 6 Fr. Center mouth <1 day         Urethral Catheter 05/22/22 1820 Silicone 16 Fr. <1 day              Airway  Duration                  Airway - Non-Surgical 05/22/22 1704 Endotracheal Tube <1 day              Peripheral Intravenous Line  Duration                  Peripheral IV - Single Lumen 05/22/22 1710 20 G Right Antecubital <1 day         Peripheral IV - Single Lumen 05/22/22 1815 20 G Right;Posterior Hand <1 day         Peripheral IV - Single Lumen 05/22/22 1830 22 G Right Forearm <1 day         Peripheral IV - Single Lumen 05/23/22 0030 20 G Left;Posterior Hand <1 day                    Significant Labs:    CBC/Anemia Profile:  Recent Labs   Lab 05/22/22  1805 05/23/22  0545   WBC 11.42 14.12*   HGB 14.2 12.5*   HCT 40.3 35.2*    151   MCV 90 88   RDW 14.0 13.8        Chemistries:  Recent Labs   Lab 05/22/22  1805 05/23/22  0256 05/23/22  0545   * 142 142   K 3.3* 3.6 3.0*   * 107 106   CO2 18* 20* 27   BUN 14 15 15   CREATININE 1.6* 1.3 1.0   CALCIUM 8.7 8.4* 8.4*   ALBUMIN 3.2*  --   --    PROT 6.0  --   --    BILITOT 1.3*  --   --    ALKPHOS 41*  --   --    ALT 16  --   --    AST 27  --   --    MG  --   --  1.5*       All pertinent labs within the past 24 hours have been reviewed.    Significant Imaging:  I have reviewed all pertinent imaging results/findings within the past 24 hours.

## 2022-05-23 NOTE — CONSULTS
[facility] - [facility] (H[facility])  Adult Nutrition  Consult Note    SUMMARY     Recommendations    Recommendation/Intervention:   1. When medically appropriate, initiate Peptamen Intense VHP:   -Goal rate @ 50mL/hr.   -Provides 1200 calories, 110g protein, and 1008mL H2O.   -Propofol provides 73 calories @ rate of 2.8.   -100mL H2O flush q 4-6 hours or per MD/NP.   -Check Mg, K+, Na, Phos, and Glucose before and during initiation;Correct as indicated.     2. Weekly weights per RD follow up.    Goals:   1. Enteral Nutrition initiation within 24 hours.    Nutrition Goal Status: new    Communication of RD Recs:  (Plan of care;Sticky Note)    Assessment and Plan    Nutrition Problem  Inadequate oral intake     Related to (etiology):   Decreased ability to consume sufficient energy     Signs and Symptoms (as evidenced by):   NPO, Intubated     Interventions/Recommendations (treatment strategy):  Enteral Nutrition Initiation   Collaboration with Medical Providers   Weekly Weights     Nutrition Diagnosis Status:   New         Malnutrition Assessment    Pt does not meet at least 2 ASPEN criteria for malnutrition at this time.  Will continue to monitor.     Reason for Assessment    Reason For Assessment: consult  Diagnosis:  (acute hypoxemic resp failure)  Relevant Medical History: depression  Interdisciplinary Rounds: attended  General Information Comments:     5/23:RD consulted for tube feeding rec's. Patient is NPO and intubated as of yesterday, Patient was admitted for suspected overdose. Patient is receiving propofol. Patient has no recent past encounters weights. Patient has no edema noted. Patients last BM is unable to be obtained. Will continue to monitor.    Nutrition Discharge Planning: pending medical course    Nutrition Risk Screen    Nutrition Risk Screen: tube feeding or parenteral nutrition    Nutrition/Diet History    Patient Reported Diet/Restrictions/Preferences:  (unknown)  Spiritual, Cultural Beliefs,  "Amish Practices, Values that Affect Care: no  Food Allergies: NKFA  Factors Affecting Nutritional Intake: NPO, on mechanical ventilation    Anthropometrics    Temp: 97.9 °F (36.6 °C)  Height Method: Estimated  Height: 5' 8" (172.7 cm)  Height (inches): 68 in  Weight Method: Standard Scale  Weight: 92 kg (202 lb 13.2 oz)  Weight (lb): 202.83 lb  Ideal Body Weight (IBW), Male: 154 lb  % Ideal Body Weight, Male (lb): 131.71 %  BMI (Calculated): 30.8  BMI Grade: 30 - 34.9- obesity - grade I       Lab/Procedures/Meds  BMP  Lab Results   Component Value Date     05/23/2022    K 3.0 (L) 05/23/2022     05/23/2022    CO2 27 05/23/2022    BUN 15 05/23/2022    CREATININE 1.0 05/23/2022    CALCIUM 8.4 (L) 05/23/2022    ANIONGAP 9 05/23/2022    ESTGFRAFRICA >60 05/23/2022    EGFRNONAA >60 05/23/2022     Lab Results   Component Value Date     05/23/2022    K 3.0 (L) 05/23/2022     05/23/2022    CO2 27 05/23/2022     Lab Results   Component Value Date    LABPROT 6.1 (L) 08/20/2017    ALBUMIN 3.2 (L) 05/22/2022     Lab Results   Component Value Date    CALCIUM 8.4 (L) 05/23/2022     Pertinent Labs Reviewed: reviewed  Pertinent Medications Reviewed: reviewed  Scheduled Meds:   aspirin  81 mg Per NG tube Daily    chlorhexidine  15 mL Mouth/Throat BID    enoxaparin  40 mg Subcutaneous Daily    famotidine (PF)  20 mg Intravenous BID    magnesium sulfate IVPB  4 g Intravenous Once    morphine  4 mg Intravenous ED 1 Time    mupirocin   Nasal BID    piperacillin-tazobactam (ZOSYN) IVPB  4.5 g Intravenous Q8H    potassium bicarbonate  30 mEq Oral Q3H    sodium chloride 0.9%  10 mL Intravenous Q8H     Continuous Infusions:   NORepinephrine bitartrate-D5W      propofoL 5 mcg/kg/min (05/23/22 0600)     PRN Meds:.acetaminophen, dextrose 10%, dextrose 10%, glucagon (human recombinant), glucose, glucose, HYDROcodone-acetaminophen, influenza, insulin aspart U-100, melatonin, naloxone, polyethylene " glycol    Physical Findings/Assessment         Estimated/Assessed Needs    Weight Used For Calorie Calculations: 92 kg (202 lb 13.2 oz)  Energy Calorie Requirements (kcal): 9424-6663 (11-14kcal/kg, permissive underfeeding, BMI >30)  Energy Need Method: Kcal/kg  Protein Requirements: 140 (>2.0g/kg, ICU, Intubted, BMI >30)  Weight Used For Protein Calculations: 70 kg (154 lb 5.2 oz) (IBW)  Fluid Requirements (mL): 3168-5529  Estimated Fluid Requirement Method: RDA Method  RDA Method (mL): 1012         Nutrition Prescription Ordered    Current Diet Order: NPO    Evaluation of Received Nutrient/Fluid Intake    Lipid Calories (kcals): 73 kcals  % Kcal Needs: 0.05%  % Protein Needs: 0%  Energy Calories Required: not meeting needs  Protein Required: not meeting needs  Fluid Required: not meeting needs  Tolerance:  (Patient NPO)  % Intake of Estimated Energy Needs: 0 - 25 %   % Meal Intake: NPO    Nutrition Risk    Level of Risk/Frequency of Follow-up: moderate - high       Monitor and Evaluation    Food and Nutrient Intake: energy intake, enteral nutrition intake  Food and Nutrient Adminstration: enteral and parenteral nutrition administration  Anthropometric Measurements: weight, weight change, body mass index  Biochemical Data, Medical Tests and Procedures: electrolyte and renal panel, gastrointestinal profile, glucose/endocrine profile, inflammatory profile, lipid profile  Nutrition-Focused Physical Findings: overall appearance       Nutrition Follow-Up    RD Follow-up?: Yes   Akanksha Zhu RD,LDN

## 2022-05-23 NOTE — ASSESSMENT & PLAN NOTE
Will need inpt psych care on discharge  PEC in place; maintain line of site monitoring once extubated

## 2022-05-23 NOTE — ASSESSMENT & PLAN NOTE
Patient with Hypoxic Respiratory failure which is Acute.  he is not on home oxygen. Supplemental oxygen was provided and noted- Vent Mode: A/C  Oxygen Concentration (%):  [] 40  Resp Rate Total:  [22 br/min-42 br/min] 24 br/min  Vt Set:  [400 mL-440 mL] 440 mL  PEEP/CPAP:  [5 cmH20] 5 cmH20  Pressure Support:  [0 cmH20] 0 cmH20  Mean Airway Pressure:  [-1.3 nvO40-23 cmH20] 11 cmH20.   Signs/symptoms of respiratory failure include- respiratory distress. Contributing diagnoses includes - Aspiration Labs and images were reviewed. Patient Has recent ABG, which has been reviewed. Will treat underlying causes and adjust management of respiratory failure as follows-   Will continue Zosyn, ventilatory support , critical care follow up

## 2022-05-23 NOTE — ASSESSMENT & PLAN NOTE
ED discussed with poison control, no recommendations for dantrolene as he was not having rigitity.   He is intubated for airway protection  .  Started on bicarbonate drip .  EKG showed qtc of 428  5/23-  Extubated today   Pt verbalized taking 8 pills of Risperdal intentionally   Sitter , suicide precaution   Tele -Psych consult

## 2022-05-23 NOTE — PROGRESS NOTES
[facility] ([facility])  [facility] Medicine  Progress Note    Patient Name: Tristin Saha  MRN: [id number]  Patient Class: IP- Inpatient   Admission Date: 5/22/2022  Length of Stay: 1 days  Attending Physician: Sadi Laurent MD  Primary Care Provider: Shari Colon MD        Subjective:     Principal Problem:Intentional drug overdose        HPI:  5/22/2022, 5:13 PM  History obtained from the [facility]      63 y/o M with PMH of bipolar, schizophrenia with prior suicide attempts here with suspected overdose. He asked the wife to stay at another house and when she got home, she found him unconscious with evidence of recent vomiting .EMS noted  noted rapid breathing, hot environment, and patient only responding to painful stimuli. He had an empty bottle of Risperdal next to him.  In the ED,  he was covered in vomit, gurgling respirations, and only localizing to pain, grunting, and not opening his eyes. He was breathing fast, in the 30's. Sats on bag valve mask were in the mid 90's. Temp was 103 ºF rectally.   Intubated for airway protection.   Lab and imaging test reviewed.  Component      Latest Ref Rng & Units 5/23/2022 5/22/2022   POC PH      7.35 - 7.45 7.494 (H) 7.278 (LL)   POC PCO2      35 - 45 mmHg 28.9 (LL) 43.9   POC PO2      80 - 100 mmHg 111 (H) 223 (H)     CT head with no acute findings. XR chest with possible aspiration.  ED work up -  He was acidotic, QRS was >100, we started bicarb at 250 cc/hr, and gave fluid bolus. Spoke with poison control, no recommendations for dantrolene as he was not having rigitity.   He will be admitted to the ICU.        Overview/Hospital Course:  Admitted to ICU overnight on mechanical ventilation for further management of intentional drug overdose. Empty bottle of Risperdal was found at home. QTc 428 on EKG. CK 1138>1123, troponin 0.050>0.184. Lactic acid normalized 1.9>4.4>3.9. Currently requiring minimal vent support.     5/23-Extubated today after successful  SAT/SBT. Somnolent , but awaken easily, oriented to self and person. Pt verbalized taking 8 pills of Risperdal intentionally. Will consult Tele-Psych today.  WBC 14K, Hgb 12.5, Plt 151. Troponin bumped to 0.184. Will get an echocardiogram. Continue ASA, empiric  antibiotic targeting aspiration pneumonia. Creatinine improved to normal 1.0>1.6. Metabolic acidosis resolved.           Interval History:  Extubated today. Will consult Tele Psych. Pt is PECed.     Review of Systems   Constitutional:  Positive for activity change and appetite change. Negative for fever.   HENT:  Negative for sore throat.    Eyes:  Negative for visual disturbance.   Respiratory:  Negative for cough, chest tightness and shortness of breath.    Cardiovascular:  Negative for chest pain, palpitations and leg swelling.   Gastrointestinal:  Negative for abdominal distention, abdominal pain, constipation, diarrhea, nausea and vomiting.   Endocrine: Negative for polyuria.   Genitourinary:  Negative for decreased urine volume, dysuria, flank pain, frequency and hematuria.   Musculoskeletal:  Negative for back pain and gait problem.   Skin:  Negative for rash.   Neurological:  Negative for syncope, speech difficulty, weakness, light-headedness and headaches.   Psychiatric/Behavioral:  Positive for confusion (mild). Negative for hallucinations.         Somnolent    Objective:     Vital Signs (Most Recent):  Temp: 99.1 °F (37.3 °C) (05/23/22 1115)  Pulse: 90 (05/23/22 1100)  Resp: (!) 26 (05/23/22 1100)  BP: 98/61 (05/23/22 1100)  SpO2: 97 % (05/23/22 1100)   Vital Signs (24h Range):  Temp:  [97.1 °F (36.2 °C)-103.7 °F (39.8 °C)] 99.1 °F (37.3 °C)  Pulse:  [] 90  Resp:  [11-40] 26  SpO2:  [93 %-100 %] 97 %  BP: ()/(48-83) 98/61     Weight: 91.6 kg (202 lb)  Body mass index is 30.71 kg/m².    Intake/Output Summary (Last 24 hours) at 5/23/2022 1134  Last data filed at 5/23/2022 0658  Gross per 24 hour   Intake 6953.76 ml   Output 2050 ml   Net  4903.76 ml      Physical Exam  Constitutional:       General: He is not in acute distress.     Appearance: He is well-developed. He is not diaphoretic.      Comments: Somnolent , easily aroused    HENT:      Head: Normocephalic and atraumatic.      Mouth/Throat:      Pharynx: No oropharyngeal exudate.   Eyes:      Conjunctiva/sclera: Conjunctivae normal.      Pupils: Pupils are equal, round, and reactive to light.   Neck:      Thyroid: No thyromegaly.      Vascular: No JVD.   Cardiovascular:      Rate and Rhythm: Normal rate and regular rhythm.      Heart sounds: Normal heart sounds. No murmur heard.  Pulmonary:      Effort: Pulmonary effort is normal. No respiratory distress.      Breath sounds: Normal breath sounds. No wheezing or rales.   Chest:      Chest wall: No tenderness.   Abdominal:      General: Bowel sounds are normal. There is no distension.      Palpations: Abdomen is soft.      Tenderness: There is no abdominal tenderness. There is no guarding or rebound.   Musculoskeletal:         General: Normal range of motion.      Cervical back: Normal range of motion and neck supple.   Lymphadenopathy:      Cervical: No cervical adenopathy.   Skin:     General: Skin is warm and dry.      Findings: No rash.   Neurological:      Mental Status: He is oriented to person, place, and time and easily aroused.      Cranial Nerves: No cranial nerve deficit.      Sensory: No sensory deficit.      Deep Tendon Reflexes: Reflexes normal.       Significant Labs: All pertinent labs within the past 24 hours have been reviewed.  BMP:   Recent Labs   Lab 05/23/22 0545 05/23/22  0806   *  --      --    K 3.0*  --      --    CO2 27  --    BUN 15  --    CREATININE 1.0  --    CALCIUM 8.4*  --    MG 1.5* 1.5*     CBC:   Recent Labs   Lab 05/22/22 1805 05/23/22  0545   WBC 11.42 14.12*   HGB 14.2 12.5*   HCT 40.3 35.2*    151     CMP:   Recent Labs   Lab 05/22/22 1805 05/23/22  0256 05/23/22  0545   *  142 142   K 3.3* 3.6 3.0*   * 107 106   CO2 18* 20* 27   * 181* 158*   BUN 14 15 15   CREATININE 1.6* 1.3 1.0   CALCIUM 8.7 8.4* 8.4*   PROT 6.0  --   --    ALBUMIN 3.2*  --   --    BILITOT 1.3*  --   --    ALKPHOS 41*  --   --    AST 27  --   --    ALT 16  --   --    ANIONGAP 13 15 9   EGFRNONAA 45* 58* >60       Significant Imaging:       Assessment/Plan:      * Intentional drug overdose  ED discussed with poison control, no recommendations for dantrolene as he was not having rigitity.   He is intubated for airway protection  .  Started on bicarbonate drip .  EKG showed qtc of 428  5/23-  Extubated today   Pt verbalized taking 8 pills of Risperdal intentionally   Sitter , suicide precaution   Tele -Psych consult        Acute hypoxemic respiratory failure  Patient with Hypoxic Respiratory failure which is Acute.  he is not on home oxygen. Supplemental oxygen was provided and noted- Vent Mode: Spont  Oxygen Concentration (%):  [] 40  Resp Rate Total:  [19 br/min-42 br/min] 19 br/min  Vt Set:  [400 mL-440 mL] 440 mL  PEEP/CPAP:  [5 cmH20] 5 cmH20  Pressure Support:  [0 cmH20-10 cmH20] 10 cmH20  Mean Airway Pressure:  [-1.3 gbH32-36 cmH20] 10 cmH20.   Signs/symptoms of respiratory failure include- respiratory distress. Contributing diagnoses includes - Aspiration Labs and images were reviewed. Patient Has recent ABG, which has been reviewed. Will treat underlying causes and adjust management of respiratory failure as follows-   Will continue Zosyn, ventilatory support , critical care follow up   5/23-  Extubated to NC today   Monitor clinical course     Schizoaffective disorder, bipolar type  Tele -Psych consult   Pt will need inpatient psychiatry evaluation    .    ALFONZO (acute kidney injury)  Will continue hydration , closely monitor serum creatinine .  Follow BMP in AM   5/23-  Resolved     Hypokalemia  Will replete , folow repeat serum BMP and check serum mag     Aspiration pneumonia    Follow  tracheal cultures   On Zosyn empirically     On mechanically assisted ventilation, S/P Extubation 5/23/22          VTE Risk Mitigation (From admission, onward)         Ordered     enoxaparin injection 40 mg  Daily         05/22/22 2315     IP VTE LOW RISK PATIENT  Once         05/22/22 2149     Place sequential compression device  Until discontinued         05/22/22 2149                Discharge Planning   DADA:      Code Status: Full Code   Is the patient medically ready for discharge?:     Reason for patient still in hospital (select all that apply): Patient trending condition  Discharge Plan A: [facility]            Critical care time spent on the evaluation and treatment of severe organ dysfunction, review of pertinent labs and imaging studies, discussions with consulting providers and discussions with patient/family: 30  minutes.      Sadi Laurent MD  Department of Hospital Medicine   [facility] ([facility])

## 2022-05-23 NOTE — HPI
5/22/2022, 5:13 PM  History obtained from the [facility]      61 y/o M with PMH of bipolar, schizophrenia with prior suicide attempts here with suspected overdose. He asked the wife to stay at another house and when she got home, she found him unconscious with evidence of recent vomiting .EMS noted  noted rapid breathing, hot environment, and patient only responding to painful stimuli. He had an empty bottle of Risperdal next to him.  In the ED,  he was covered in vomit, gurgling respirations, and only localizing to pain, grunting, and not opening his eyes. He was breathing fast, in the 30's. Sats on bag valve mask were in the mid 90's. Temp was 103 ºF rectally.   Intubated for airway protection.   Lab and imaging test reviewed.  Component      Latest Ref Rng & Units 5/23/2022 5/22/2022   POC PH      7.35 - 7.45 7.494 (H) 7.278 (LL)   POC PCO2      35 - 45 mmHg 28.9 (LL) 43.9   POC PO2      80 - 100 mmHg 111 (H) 223 (H)     CT head with no acute findings. XR chest with possible aspiration.  ED work up -  He was acidotic, QRS was >100, we started bicarb at 250 cc/hr, and gave fluid bolus. Spoke with poison control, no recommendations for dantrolene as he was not having rigitity.   He will be admitted to the ICU.

## 2022-05-23 NOTE — SUBJECTIVE & OBJECTIVE
Past Medical History:   Diagnosis Date    Depression        No past surgical history on file.    Review of patient's allergies indicates:   Allergen Reactions    Haldol [haloperidol lactate]      Shaking      Lamictal [lamotrigine]     Trileptal [oxcarbazepine]        No current facility-administered medications on file prior to encounter.     Current Outpatient Medications on File Prior to Encounter   Medication Sig    busPIRone (BUSPAR) 15 MG tablet Take 1 tablet (15 mg total) by mouth 2 (two) times daily.    diclofenac sodium 1 % Gel Apply topically 3 (three) times daily as needed (neck/back/arm pain). 10 g tube ok    gabapentin (NEURONTIN) 300 MG capsule Take 2 capsules (600 mg total) by mouth 3 (three) times daily.    lithium (LITHOBID) 300 MG CR tablet Take 2 tablets (600 mg total) by mouth every 12 (twelve) hours.    nicotine (NICODERM CQ) 21 mg/24 hr Place 1 patch onto the skin once daily.    risperidone (RISPERDAL) 4 MG tablet Take 1 tablet (4 mg total) by mouth every evening.     Family History    None       Tobacco Use    Smoking status: Current Some Day Smoker    Smokeless tobacco: Not on file   Substance and Sexual Activity    Alcohol use: Not on file    Drug use: Not on file    Sexual activity: Not on file     Review of Systems   Unable to perform ROS: Acuity of condition   Objective:     Vital Signs (Most Recent):  Temp: 99.1 °F (37.3 °C) (05/23/22 1115)  Pulse: 90 (05/23/22 1400)  Resp: (!) 22 (05/23/22 1400)  BP: 112/64 (05/23/22 1400)  SpO2: 95 % (05/23/22 1400)   Vital Signs (24h Range):  Temp:  [97.1 °F (36.2 °C)-103.7 °F (39.8 °C)] 99.1 °F (37.3 °C)  Pulse:  [] 90  Resp:  [11-40] 22  SpO2:  [93 %-100 %] 95 %  BP: ()/(48-83) 112/64     Weight: 91.6 kg (202 lb)  Body mass index is 30.71 kg/m².    SpO2: 95 %  O2 Device (Oxygen Therapy): ventilator      Intake/Output Summary (Last 24 hours) at 5/23/2022 1440  Last data filed at 5/23/2022 1300  Gross per 24 hour   Intake 7155.48 ml    Output 3125 ml   Net 4030.48 ml       Lines/Drains/Airways       Peripherally Inserted Central Catheter Line  Duration             PICC Double Lumen 05/23/22 0425 left basilic <1 day              Peripheral Intravenous Line  Duration                  Peripheral IV - Single Lumen 05/22/22 1710 20 G Right Antecubital <1 day         Peripheral IV - Single Lumen 05/22/22 1815 20 G Right;Posterior Hand <1 day         Peripheral IV - Single Lumen 05/22/22 1830 22 G Right Forearm <1 day         Peripheral IV - Single Lumen 05/23/22 0030 20 G Left;Posterior Hand <1 day                    Physical Exam  Vitals and nursing note reviewed.   Constitutional:       General: He is not in acute distress.     Appearance: He is well-developed. He is ill-appearing. He is not diaphoretic.   HENT:      Head: Normocephalic and atraumatic.      Nose: Nose normal.   Eyes:      General: No scleral icterus.     Conjunctiva/sclera: Conjunctivae normal.   Neck:      Thyroid: No thyromegaly.      Vascular: No JVD.   Cardiovascular:      Rate and Rhythm: Normal rate and regular rhythm.      Heart sounds: S1 normal and S2 normal. No murmur heard.    No friction rub. No gallop. No S3 or S4 sounds.   Pulmonary:      Effort: Pulmonary effort is normal. No respiratory distress.      Breath sounds: Normal breath sounds. No stridor. No wheezing or rales.   Chest:      Chest wall: No tenderness.   Abdominal:      General: Bowel sounds are normal. There is no distension.      Palpations: Abdomen is soft. There is no mass.      Tenderness: There is no abdominal tenderness. There is no rebound.   Genitourinary:     Comments: Deferred  Musculoskeletal:         General: No tenderness or deformity. Normal range of motion.      Cervical back: Normal range of motion and neck supple.   Lymphadenopathy:      Cervical: No cervical adenopathy.   Skin:     General: Skin is warm and dry.      Coloration: Skin is not pale.      Findings: No erythema or rash.    Neurological:      Mental Status: He is alert and oriented to person, place, and time.      Motor: No abnormal muscle tone.      Coordination: Coordination normal.   Psychiatric:         Attention and Perception: He is inattentive.         Behavior: Behavior is slowed and withdrawn.         Thought Content: Thought content normal.         Cognition and Memory: Memory is impaired.         Judgment: Judgment normal.       Significant Labs: All pertinent lab results from the last 24 hours have been reviewed.    Significant Imaging: Echocardiogram: Transthoracic echo (TTE) complete (Cupid Only):   Results for orders placed or performed during the hospital encounter of 05/22/22   Echo   Result Value Ref Range    BSA 2.1 m2    IVRT [id number] msec    TDI LATERAL 0.08 m/s    Left Ventricular Outflow Tract Mean Gradient 2.15 mmHg    Left Ventricular Outflow Tract Mean Velocity 0.2157967318 cm/s    MV stenosis pressure 1/2 time 82.205804487917396 ms    TR Max Daniel 1.69 m/s    IVC diameter 2.14 cm    Ao root annulus 4.07 cm    Ao peak daniel 1.17 m/s    Posterior Wall 1.50 (A) 0.6 - 1.1 cm    LVOT diameter 2.45 cm    LVOT peak daniel 0.96 m/s    LVOT peak VTI 18.80 cm    E wave deceleration time 283.596678526750533 msec    MV Peak A Daniel 0.99 m/s    MV Peak E Daniel 0.77 m/s    RA Major Axis 4.94 cm    PV mean gradient 0.72 mmHg    RVOT peak daniel 0.60 m/s    RVOT peak VTI 10.0 cm    IVS 1.41 (A) 0.6 - 1.1 cm    Ao VTI 20.1 cm    AV mean gradient 3 mmHg    LVIDd 5.27 3.5 - 6.0 cm    LVIDs 4.25 (A) 2.1 - 4.0 cm    Left Atrium Major Axis 5.08 cm    Left Atrium Minor Axis 4.97 cm    STJ 2.96 cm    Ascending aorta 3.13 cm    LV Systolic Volume 80.83 mL    LV Diastolic Volume 133.86 mL    TDI SEPTAL 0.10 m/s    LA size 2.62 cm    LV LATERAL E/E' RATIO 9.63 m/s    LV SEPTAL E/E' RATIO 7.70 m/s    FS 19 %    LV mass 334.22 g    Left Ventricle Relative Wall Thickness 0.57 cm    AV valve area 4.41 cm2    AV Velocity Ratio 0.82     AV  index (prosthetic) 0.94     MV valve area p 1/2 method 2.68 cm2    E/A ratio 0.78     Mean e' 0.09 m/s    LVOT area 4.7 cm2    LVOT stroke volume 88.58 cm3    AV peak gradient 5 mmHg    E/E' ratio 8.56 m/s    LV Systolic Volume Index 39.4 mL/m2    LV Diastolic Volume Index 65.30 mL/m2    LV Mass Index 163 g/m2    Triscuspid Valve Regurgitation Peak Gradient 11 mmHg    LA WIDTH 2.50 cm    LA volume 27.97 cm3    Sinus 3.58 cm    TAPSE 1.44 cm    LA Volume Index 13.6 mL/m2    RA Width 4.21 cm    EF 15 %    Narrative    · The left ventricle is mildly enlarged with moderate concentric   hypertrophy and severely decreased systolic function.  · The estimated ejection fraction is 15%.  · Grade I left ventricular diastolic dysfunction.  · There are segmental left ventricular wall motion abnormalities.  · Normal right ventricular size with moderately reduced right ventricular   systolic function.  · Mild tricuspid regurgitation.  · Mechanically ventilated; cannot use inferior caval vein diameter to   estimate central venous pressure.  · The aortic root is mildly dilated.

## 2022-05-23 NOTE — PLAN OF CARE
RASS -1, sedated on propofol @ 5. Localizes to painful stimulation, cough, gag, pupil reflexes intact. Does not follow commands. Intubated and on the ventilator. FiO2 40%, 5 PEEP, sats high 90s. NSR. Levophed @ 0.06. OGT to LIWS. No BM overnight. Voiding per garzon catheter. Afebrile since admit to ICU. Heel boots in place. Pt turned q 2 hrs.

## 2022-05-23 NOTE — HPI
62 year old male with PMH including bipolar disorder; schizophrenia, and prior suicide attempts    Presented to ED via EMS called when spouse found him unconscious with evidence of vomiting. EMS noted rapid breathing, response only to pain, and an empty bottle of risperdal near pt.  Last known normal state near 24 hours prior to ED presentation    Required intubation for airway protection in ED, temp 103 rectally, initial pH 7.278  Labs revealed unremarkable cbc; Na 146; K 3.3; creatinine 1.6; CPK 1138; lactate 3.9  Tylenol, lithium, and salicylate levels all less than measurable   UDS without any positive findings  CXR showed LLL atelectasis and perihilar infiltrates by my interpretation

## 2022-05-23 NOTE — PLAN OF CARE
[facility] - Intensive Care (Hospital)  Initial Discharge Assessment       Primary Care Provider: Shari Colon MD    Admission Diagnosis: Hyperthermia [R50.9]  Suicide attempt [T14.91XA]  Tachycardia [R00.0]  Encounter for feeding tube placement [Z46.59]  Endotracheally intubated [Z97.8]  Non-traumatic rhabdomyolysis [M62.82]    Admission Date: 5/22/2022  Expected Discharge Date:     Discharge Barriers Identified: None    Payor: [employer] / Plan: [employer] MEDICARE PFFS / Product Type: Medicare Advantage /     Extended Emergency Contact Information  Primary Emergency Contact: Zee Saha  Address: [address] [address]  Mobile Phone: [phone]  Relation: Spouse  Secondary Emergency Contact: Elba Horan  Mobile Phone: [phone]  Relation: Daughter    Discharge Plan A: Psychiatric hospital       No Pharmacies Listed    Initial Assessment (most recent)     Adult Discharge Assessment - 05/23/22 1050        Discharge Assessment    Assessment Type Discharge Planning Assessment     Confirmed/corrected address, phone number and insurance Yes     Confirmed Demographics Correct on Facesheet     Source of Information health record     When was your last doctors appointment? --   unknown    Communicated DADA with patient/caregiver Date not available/Unable to determine     Reason For Admission Acute hypoxemic respiratory failure     Lives With spouse     Facility Arrived From: home     Do you expect to return to your current living situation? Yes     Do you have help at home or someone to help you manage your care at home? Yes     Who are your caregiver(s) and their phone number(s)? Zee Saha (Spouse)     Prior to hospitilization cognitive status: Alert/Oriented     Current cognitive status: Coma/Sedated/Intubated     Dressing/Bathing Difficulty none     Home Accessibility stairs to enter home     Number of Stairs, Main Entrance five     Equipment Currently  Used at Home none     Readmission within 30 days? No     Patient currently being followed by outpatient case management? No     Do you currently have service(s) that help you manage your care at home? No     Do you take prescription medications? Yes     Do you have prescription coverage? Yes     Do you have any problems affording any of your prescribed medications? No     Is the patient taking medications as prescribed? yes     Who is going to help you get home at discharge? Zee Saha (Spouse)     How do you get to doctors appointments? family or friend will provide     Are you on dialysis? No     Do you take coumadin? No     Discharge Plan A Psychiatric hospital     DME Needed Upon Discharge  none     Discharge Barriers Identified None        Relationship/Environment    Name(s) of Who Lives With Patient Zee Saha (Spouse)               Anticipated DC dispo: psychiatric hospital   Prior Level of Function: independent, lives with spouse   PCP: Shari Colon MD    Comments:  Chart review completed for assessment. Patient recently extubated and lethargic. Unable to reach spouse. Patient lives with spouse. Recent discharge (April 2022) from [facility]. CM discharge needs depends on hospital progress. CM will continue following to assist with other needs.

## 2022-05-23 NOTE — ASSESSMENT & PLAN NOTE
S/t intentional overdose  Vent settings reviewed and adjusted to optimize gas exchange  Daily SAT/SBT for extubation readiness

## 2022-05-23 NOTE — ASSESSMENT & PLAN NOTE
Patient with Hypoxic Respiratory failure which is Acute.  he is not on home oxygen. Supplemental oxygen was provided and noted- Vent Mode: Spont  Oxygen Concentration (%):  [] 40  Resp Rate Total:  [19 br/min-42 br/min] 19 br/min  Vt Set:  [400 mL-440 mL] 440 mL  PEEP/CPAP:  [5 cmH20] 5 cmH20  Pressure Support:  [0 cmH20-10 cmH20] 10 cmH20  Mean Airway Pressure:  [-1.3 pkA74-27 cmH20] 10 cmH20.   Signs/symptoms of respiratory failure include- respiratory distress. Contributing diagnoses includes - Aspiration Labs and images were reviewed. Patient Has recent ABG, which has been reviewed. Will treat underlying causes and adjust management of respiratory failure as follows-   Will continue Zosyn, ventilatory support , critical care follow up   5/23-  Extubated to NC today   Monitor clinical course

## 2022-05-23 NOTE — PROCEDURES
"Tristin Saha is a 62 y.o. male patient.    Temp: 98.2 °F (36.8 °C) (05/22/22 2300)  Pulse: 82 (05/23/22 0503)  Resp: (!) 25 (05/23/22 0503)  BP: 116/60 (05/23/22 0503)  SpO2: 98 % (05/23/22 0503)  Weight: 92 kg (202 lb 13.2 oz) (05/22/22 2200)  Height: 5' 8" (172.7 cm) (05/22/22 2215)    PICC  Date/Time: 5/23/2022 4:25 AM  Performed by: Basil Caicedo RN  Consent Done: Yes  Time out: Immediately prior to procedure a time out was called to verify the correct patient, procedure, equipment, support staff and site/side marked as required  Indications: med administration and vascular access  Anesthesia: local infiltration  Local anesthetic: lidocaine 1% without epinephrine  Anesthetic Total (mL): 2  Preparation: skin prepped with chlorhexidine (without alcohol)  Skin prep agent dried: skin prep agent completely dried prior to procedure  Sterile barriers: all five maximum sterile barriers used - cap, mask, sterile gown, sterile gloves, and large sterile sheet  Hand hygiene: hand hygiene performed prior to central venous catheter insertion  Location details: left basilic  Catheter type: double lumen  Catheter size: 4 Fr  Catheter Length: 41cm    Ultrasound guidance: yes  Vessel Caliber: medium and patent, compressibility normal  Needle advanced into vessel with real time Ultrasound guidance.  Guidewire confirmed in vessel.  Sterile sheath used.  Number of attempts: 1  Post-procedure: blood return through all ports, chlorhexidine patch and sterile dressing applied  Estimated blood loss (mL): 0  Specimens: No  Implants: No  Comments: Awaiting xray for confirmation of placement           Basil Caicedo RN  5/23/2022  "

## 2022-05-23 NOTE — HOSPITAL COURSE
Admitted to ICU overnight on mechanical ventilation for further management of intentional drug overdose. Empty bottle of Risperdal was found at home. QTc 428 on EKG. CK 1138>1123, troponin 0.050>0.184. Lactic acid normalized 1.9>4.4>3.9. Currently requiring minimal vent support.     5/23-Extubated today after successful SAT/SBT. Somnolent , but awaken easily, oriented to self and person. Pt verbalized taking 8 pills of Risperdal intentionally. Will consult Tele-Psych today.  WBC 14K, Hgb 12.5, Plt 151. Troponin bumped to 0.184. Will get an echocardiogram. Continue ASA, empiric  antibiotic targeting aspiration pneumonia. Creatinine improved to normal 1.0>1.6. Metabolic acidosis resolved.     5/24- Awake . Appears confused , intermittently oriented to self and place , speech is tangential, slurred  and unintelligible. Will resume home medicine Buspar and low dose risperidone. Echo suggested LVEF 15%, G1DD, segmental ventricular WMA. Cardiology consulted . Pt started on ASA, statin, BB and ARNI and will need ischemic workup and possibly Life Vest. Troponin 0.117 this morning. Awaiting Tele-Psych consult . Downgrade to [employer].     5/25- Pt appears calmer , speech is much clearer today. Oriented x 3. New onset fever overnight . Tmax 101.3. Repeat CXR showed consolidation in the left mid lower lung with small effusion consistent with pneumonia. Antibiotic include Augmentin and Doxycycline inititaed . SpO2 93% on RA. WBC 11.1K, Plt 132, Cr 1.0. PCT 7.52.  Troponin trended down to 0.068. Denies chest pain or SOB. Cards suggested to continue OMT for now and ischemic workup once patient is stable mentally. Pt deemed not a good candidate for Life vest. Tele Psych consult obtained. Buspar discontinued as recommended by Tele Psych. Risperidone continues with PRN Zyprexa. Disposition - In-patient Psych placement once pt is fever free x 48h. Currently stable from Cardiac stand point. Pt is CEDed.     05/26: patient afebrile  overnight - continue PO amoxicillin and likely stable to dc to PSYCH facility tomorrow am.  5/27/22: Labs reviewed and stable. Pt medically cleared.   5/28/22: Rapid COVID screening positive, pt transferred to telemetry unit. Patient asymptomatic, O2 sats 93% on room air. Awaiting placement.   5/28/22:Overnight pt discharge to [facility].

## 2022-05-24 PROBLEM — N17.9 AKI (ACUTE KIDNEY INJURY): Status: RESOLVED | Noted: 2022-05-23 | Resolved: 2022-05-24

## 2022-05-24 PROBLEM — I42.9 CARDIOMYOPATHY: Status: RESOLVED | Noted: 2022-05-24 | Resolved: 2022-05-24

## 2022-05-24 PROBLEM — I42.9 CARDIOMYOPATHY: Status: ACTIVE | Noted: 2022-05-24

## 2022-05-24 PROBLEM — E87.6 HYPOKALEMIA: Status: RESOLVED | Noted: 2022-05-23 | Resolved: 2022-05-24

## 2022-05-24 PROBLEM — G92.9 ENCEPHALOPATHY, TOXIC: Status: ACTIVE | Noted: 2022-05-24

## 2022-05-24 LAB
ANION GAP SERPL CALC-SCNC: 6 MMOL/L (ref 8–16)
ANISOCYTOSIS BLD QL SMEAR: SLIGHT
BASOPHILS # BLD AUTO: 0.06 K/UL (ref 0–0.2)
BASOPHILS NFR BLD: 0.5 % (ref 0–1.9)
BUN SERPL-MCNC: 12 MG/DL (ref 8–23)
CALCIUM SERPL-MCNC: 9.1 MG/DL (ref 8.7–10.5)
CHLORIDE SERPL-SCNC: 115 MMOL/L (ref 95–110)
CK SERPL-CCNC: 697 U/L (ref 20–200)
CO2 SERPL-SCNC: 23 MMOL/L (ref 23–29)
CREAT SERPL-MCNC: 1.1 MG/DL (ref 0.5–1.4)
DIFFERENTIAL METHOD: ABNORMAL
EOSINOPHIL # BLD AUTO: 0.1 K/UL (ref 0–0.5)
EOSINOPHIL NFR BLD: 0.6 % (ref 0–8)
ERYTHROCYTE [DISTWIDTH] IN BLOOD BY AUTOMATED COUNT: 14.3 % (ref 11.5–14.5)
EST. GFR  (AFRICAN AMERICAN): >60 ML/MIN/1.73 M^2
EST. GFR  (NON AFRICAN AMERICAN): >60 ML/MIN/1.73 M^2
GLUCOSE SERPL-MCNC: 91 MG/DL (ref 70–110)
HCT VFR BLD AUTO: 35.5 % (ref 40–54)
HGB BLD-MCNC: 12.3 G/DL (ref 14–18)
IMM GRANULOCYTES # BLD AUTO: 0.11 K/UL (ref 0–0.04)
IMM GRANULOCYTES NFR BLD AUTO: 0.9 % (ref 0–0.5)
LYMPHOCYTES # BLD AUTO: 2.2 K/UL (ref 1–4.8)
LYMPHOCYTES NFR BLD: 17.6 % (ref 18–48)
MAGNESIUM SERPL-MCNC: 2.5 MG/DL (ref 1.6–2.6)
MCH RBC QN AUTO: 31.9 PG (ref 27–31)
MCHC RBC AUTO-ENTMCNC: 34.6 G/DL (ref 32–36)
MCV RBC AUTO: 92 FL (ref 82–98)
MONOCYTES # BLD AUTO: 0.7 K/UL (ref 0.3–1)
MONOCYTES NFR BLD: 5.2 % (ref 4–15)
NEUTROPHILS # BLD AUTO: 9.5 K/UL (ref 1.8–7.7)
NEUTROPHILS NFR BLD: 75.2 % (ref 38–73)
NRBC BLD-RTO: 0 /100 WBC
OVALOCYTES BLD QL SMEAR: ABNORMAL
PLATELET # BLD AUTO: 116 K/UL (ref 150–450)
PLATELET BLD QL SMEAR: ABNORMAL
PMV BLD AUTO: 9.8 FL (ref 9.2–12.9)
POCT GLUCOSE: 101 MG/DL (ref 70–110)
POCT GLUCOSE: 102 MG/DL (ref 70–110)
POIKILOCYTOSIS BLD QL SMEAR: SLIGHT
POTASSIUM SERPL-SCNC: 4.1 MMOL/L (ref 3.5–5.1)
RBC # BLD AUTO: 3.85 M/UL (ref 4.6–6.2)
SODIUM SERPL-SCNC: 144 MMOL/L (ref 136–145)
TROPONIN I SERPL DL<=0.01 NG/ML-MCNC: 0.12 NG/ML (ref 0–0.03)
WBC # BLD AUTO: 12.6 K/UL (ref 3.9–12.7)

## 2022-05-24 PROCEDURE — 27000221 HC OXYGEN, UP TO 24 HOURS

## 2022-05-24 PROCEDURE — 99233 SBSQ HOSP IP/OBS HIGH 50: CPT | Mod: ,,, | Performed by: NURSE PRACTITIONER

## 2022-05-24 PROCEDURE — A4216 STERILE WATER/SALINE, 10 ML: HCPCS | Performed by: INTERNAL MEDICINE

## 2022-05-24 PROCEDURE — 11000001 HC ACUTE MED/SURG PRIVATE ROOM

## 2022-05-24 PROCEDURE — 25000003 PHARM REV CODE 250: Performed by: INTERNAL MEDICINE

## 2022-05-24 PROCEDURE — 99233 SBSQ HOSP IP/OBS HIGH 50: CPT | Mod: ,,, | Performed by: INTERNAL MEDICINE

## 2022-05-24 PROCEDURE — 83735 ASSAY OF MAGNESIUM: CPT | Performed by: NURSE PRACTITIONER

## 2022-05-24 PROCEDURE — 25000003 PHARM REV CODE 250: Performed by: NURSE PRACTITIONER

## 2022-05-24 PROCEDURE — 21400001 HC TELEMETRY ROOM

## 2022-05-24 PROCEDURE — S0166 INJ OLANZAPINE 2.5MG: HCPCS | Performed by: INTERNAL MEDICINE

## 2022-05-24 PROCEDURE — 99205 PR OFFICE/OUTPT VISIT, NEW, LEVL V, 60-74 MIN: ICD-10-PCS | Mod: GT,,, | Performed by: PSYCHIATRY & NEUROLOGY

## 2022-05-24 PROCEDURE — 99233 PR SUBSEQUENT HOSPITAL CARE,LEVL III: ICD-10-PCS | Mod: ,,, | Performed by: INTERNAL MEDICINE

## 2022-05-24 PROCEDURE — 63600175 PHARM REV CODE 636 W HCPCS: Performed by: INTERNAL MEDICINE

## 2022-05-24 PROCEDURE — 85025 COMPLETE CBC W/AUTO DIFF WBC: CPT | Performed by: NURSE PRACTITIONER

## 2022-05-24 PROCEDURE — 94761 N-INVAS EAR/PLS OXIMETRY MLT: CPT

## 2022-05-24 PROCEDURE — 82550 ASSAY OF CK (CPK): CPT | Performed by: NURSE PRACTITIONER

## 2022-05-24 PROCEDURE — 99233 PR SUBSEQUENT HOSPITAL CARE,LEVL III: ICD-10-PCS | Mod: ,,, | Performed by: NURSE PRACTITIONER

## 2022-05-24 PROCEDURE — 84484 ASSAY OF TROPONIN QUANT: CPT | Performed by: INTERNAL MEDICINE

## 2022-05-24 PROCEDURE — 99205 OFFICE O/P NEW HI 60 MIN: CPT | Mod: GT,,, | Performed by: PSYCHIATRY & NEUROLOGY

## 2022-05-24 PROCEDURE — 80048 BASIC METABOLIC PNL TOTAL CA: CPT | Performed by: NURSE PRACTITIONER

## 2022-05-24 RX ORDER — POLYETHYLENE GLYCOL 3350 17 G/17G
17 POWDER, FOR SOLUTION ORAL 2 TIMES DAILY
Status: DISCONTINUED | OUTPATIENT
Start: 2022-05-24 | End: 2022-05-29 | Stop reason: HOSPADM

## 2022-05-24 RX ORDER — RISPERIDONE 0.25 MG/1
0.5 TABLET ORAL 2 TIMES DAILY
Status: DISCONTINUED | OUTPATIENT
Start: 2022-05-24 | End: 2022-05-29 | Stop reason: HOSPADM

## 2022-05-24 RX ORDER — OLANZAPINE 10 MG/2ML
2.5 INJECTION, POWDER, FOR SOLUTION INTRAMUSCULAR EVERY 8 HOURS PRN
Status: DISCONTINUED | OUTPATIENT
Start: 2022-05-24 | End: 2022-05-29 | Stop reason: HOSPADM

## 2022-05-24 RX ORDER — SODIUM CHLORIDE 9 MG/ML
INJECTION, SOLUTION INTRAVENOUS
Status: DISCONTINUED | OUTPATIENT
Start: 2022-05-24 | End: 2022-05-29 | Stop reason: HOSPADM

## 2022-05-24 RX ADMIN — OLANZAPINE 2.5 MG: 10 INJECTION, POWDER, LYOPHILIZED, FOR SOLUTION INTRAMUSCULAR at 05:05

## 2022-05-24 RX ADMIN — CHLORHEXIDINE GLUCONATE 0.12% ORAL RINSE 15 ML: 1.2 LIQUID ORAL at 08:05

## 2022-05-24 RX ADMIN — CARVEDILOL 3.12 MG: 3.12 TABLET, FILM COATED ORAL at 09:05

## 2022-05-24 RX ADMIN — RISPERIDONE 0.5 MG: 0.5 TABLET ORAL at 08:05

## 2022-05-24 RX ADMIN — POLYETHYLENE GLYCOL 3350 17 G: 17 POWDER, FOR SOLUTION ORAL at 08:05

## 2022-05-24 RX ADMIN — BUSPIRONE HYDROCHLORIDE 15 MG: 10 TABLET ORAL at 09:05

## 2022-05-24 RX ADMIN — SODIUM CHLORIDE: 0.9 INJECTION, SOLUTION INTRAVENOUS at 08:05

## 2022-05-24 RX ADMIN — PIPERACILLIN SODIUM AND TAZOBACTAM SODIUM 4.5 G: 4; .5 INJECTION, POWDER, LYOPHILIZED, FOR SOLUTION INTRAVENOUS at 05:05

## 2022-05-24 RX ADMIN — Medication 6 MG: at 08:05

## 2022-05-24 RX ADMIN — Medication 10 ML: at 05:05

## 2022-05-24 RX ADMIN — MUPIROCIN: 20 OINTMENT TOPICAL at 09:05

## 2022-05-24 RX ADMIN — FAMOTIDINE 20 MG: 20 TABLET ORAL at 08:05

## 2022-05-24 RX ADMIN — FAMOTIDINE 20 MG: 20 TABLET ORAL at 09:05

## 2022-05-24 RX ADMIN — ENOXAPARIN SODIUM 40 MG: 40 INJECTION SUBCUTANEOUS at 04:05

## 2022-05-24 RX ADMIN — ACETAMINOPHEN 650 MG: 325 TABLET ORAL at 08:05

## 2022-05-24 RX ADMIN — ATORVASTATIN CALCIUM 40 MG: 40 TABLET, FILM COATED ORAL at 08:05

## 2022-05-24 RX ADMIN — CARVEDILOL 3.12 MG: 3.12 TABLET, FILM COATED ORAL at 08:05

## 2022-05-24 RX ADMIN — ASPIRIN 81 MG CHEWABLE TABLET 81 MG: 81 TABLET CHEWABLE at 09:05

## 2022-05-24 RX ADMIN — RISPERIDONE 0.5 MG: 0.5 TABLET ORAL at 09:05

## 2022-05-24 RX ADMIN — MUPIROCIN: 20 OINTMENT TOPICAL at 08:05

## 2022-05-24 RX ADMIN — SACUBITRIL AND VALSARTAN 1 TABLET: 24; 26 TABLET, FILM COATED ORAL at 08:05

## 2022-05-24 RX ADMIN — Medication 10 ML: at 10:05

## 2022-05-24 NOTE — NURSING
Phoned pt's spouse to notify that pt had moved to room 504. Informed her that pt belongings (belt, boots, etc) were in a labeled bag with the sitter and she confirmed that she would take them home with her today when she comes to visit.

## 2022-05-24 NOTE — ASSESSMENT & PLAN NOTE
Tele -Psych consult   Pt will need inpatient psychiatry evaluation   5/24-  Resume home meds - Buspar and low dose Risperidone    .

## 2022-05-24 NOTE — SUBJECTIVE & OBJECTIVE
Review of Systems   Reason unable to perform ROS: limited due to paranoia/confusion.   Objective:     Vital Signs (Most Recent):  Temp: 98.8 °F (37.1 °C) (05/24/22 1217)  Pulse: 79 (05/24/22 1217)  Resp: 17 (05/24/22 1217)  BP: 122/78 (05/24/22 1217)  SpO2: (!) 94 % (05/24/22 1217)   Vital Signs (24h Range):  Temp:  [98.6 °F (37 °C)-100.3 °F (37.9 °C)] 98.8 °F (37.1 °C)  Pulse:  [78-98] 79  Resp:  [17-57] 17  SpO2:  [91 %-100 %] 94 %  BP: ()/(55-94) 122/78     Weight: 88.2 kg (194 lb 7.1 oz)  Body mass index is 29.57 kg/m².     SpO2: (!) 94 %  O2 Device (Oxygen Therapy): room air      Intake/Output Summary (Last 24 hours) at 5/24/2022 1347  Last data filed at 5/24/2022 0900  Gross per 24 hour   Intake 527.67 ml   Output 1075 ml   Net -547.33 ml       Lines/Drains/Airways       Peripherally Inserted Central Catheter Line  Duration             PICC Double Lumen 05/23/22 0425 left basilic 1 day              Peripheral Intravenous Line  Duration                  Peripheral IV - Single Lumen 05/22/22 1710 20 G Right Antecubital 1 day         Peripheral IV - Single Lumen 05/22/22 1815 20 G Right;Posterior Hand 1 day         Peripheral IV - Single Lumen 05/23/22 0030 20 G Left;Posterior Hand 1 day                    Physical Exam  Vitals and nursing note reviewed.   Constitutional:       General: He is not in acute distress.     Appearance: He is well-developed. He is not diaphoretic.   HENT:      Head: Normocephalic and atraumatic.   Eyes:      General:         Right eye: No discharge.         Left eye: No discharge.      Pupils: Pupils are equal, round, and reactive to light.   Neck:      Thyroid: No thyromegaly.      Vascular: No JVD.      Trachea: No tracheal deviation.   Cardiovascular:      Rate and Rhythm: Normal rate and regular rhythm.      Heart sounds: Normal heart sounds, S1 normal and S2 normal. No murmur heard.  Pulmonary:      Effort: Pulmonary effort is normal. No respiratory distress.      Breath  sounds: Normal breath sounds. No wheezing or rales.   Abdominal:      General: There is no distension.      Palpations: Abdomen is soft.      Tenderness: There is no rebound.   Musculoskeletal:      Cervical back: Neck supple.   Skin:     General: Skin is warm and dry.      Findings: No erythema.   Neurological:      Mental Status: He is alert.      Comments: Oriented to self  Intermittently confused/paranoid   Psychiatric:         Mood and Affect: Mood normal.         Behavior: Behavior normal.       Significant Labs: CMP   Recent Labs   Lab 05/22/22  1805 05/23/22  0256 05/23/22  0545 05/23/22  1532 05/24/22  0511   *   < > 142 144 144   K 3.3*   < > 3.0* 3.7 4.1   *   < > 106 111* 115*   CO2 18*   < > 27 25 23   *   < > 158* 95 91   BUN 14   < > 15 14 12   CREATININE 1.6*   < > 1.0 1.1 1.1   CALCIUM 8.7   < > 8.4* 9.1 9.1   PROT 6.0  --   --   --   --    ALBUMIN 3.2*  --   --   --   --    BILITOT 1.3*  --   --   --   --    ALKPHOS 41*  --   --   --   --    AST 27  --   --   --   --    ALT 16  --   --   --   --    ANIONGAP 13   < > 9 8 6*   ESTGFRAFRICA 52*   < > >60 >60 >60   EGFRNONAA 45*   < > >60 >60 >60    < > = values in this interval not displayed.   , CBC   Recent Labs   Lab 05/22/22  1805 05/23/22  0545 05/24/22  0511   WBC 11.42 14.12* 12.60   HGB 14.2 12.5* 12.3*   HCT 40.3 35.2* 35.5*    151 116*   , Troponin   Recent Labs   Lab 05/23/22  0256 05/23/22  0806 05/24/22  0511   TROPONINI 0.167* 0.184* 0.117*   , and All pertinent lab results from the last 24 hours have been reviewed.    Significant Imaging: Echocardiogram: Transthoracic echo (TTE) complete (Cupid Only):   Results for orders placed or performed during the hospital encounter of 05/22/22   Echo   Result Value Ref Range    BSA 2.1 m2    IVRT [id number] msec    TDI LATERAL 0.08 m/s    Left Ventricular Outflow Tract Mean Gradient 2.15 mmHg    Left Ventricular Outflow Tract Mean Velocity 0.0133579328 cm/s    MV  stenosis pressure 1/2 time 82.548201881939838 ms    TR Max Daniel 1.69 m/s    IVC diameter 2.14 cm    Ao root annulus 4.07 cm    Ao peak daniel 1.17 m/s    Posterior Wall 1.50 (A) 0.6 - 1.1 cm    LVOT diameter 2.45 cm    LVOT peak daniel 0.96 m/s    LVOT peak VTI 18.80 cm    E wave deceleration time [id number] msec    MV Peak A Daniel 0.99 m/s    MV Peak E Daniel 0.77 m/s    RA Major Axis 4.94 cm    PV mean gradient 0.72 mmHg    RVOT peak daniel 0.60 m/s    RVOT peak VTI 10.0 cm    IVS 1.41 (A) 0.6 - 1.1 cm    Ao VTI 20.1 cm    AV mean gradient 3 mmHg    LVIDd 5.27 3.5 - 6.0 cm    LVIDs 4.25 (A) 2.1 - 4.0 cm    Left Atrium Major Axis 5.08 cm    Left Atrium Minor Axis 4.97 cm    STJ 2.96 cm    Ascending aorta 3.13 cm    LV Systolic Volume 80.83 mL    LV Diastolic Volume 133.86 mL    TDI SEPTAL 0.10 m/s    LA size 2.62 cm    LV LATERAL E/E' RATIO 9.63 m/s    LV SEPTAL E/E' RATIO 7.70 m/s    FS 19 %    LV mass 334.22 g    Left Ventricle Relative Wall Thickness 0.57 cm    AV valve area 4.41 cm2    AV Velocity Ratio 0.82     AV index (prosthetic) 0.94     MV valve area p 1/2 method 2.68 cm2    E/A ratio 0.78     Mean e' 0.09 m/s    LVOT area 4.7 cm2    LVOT stroke volume 88.58 cm3    AV peak gradient 5 mmHg    E/E' ratio 8.56 m/s    LV Systolic Volume Index 39.4 mL/m2    LV Diastolic Volume Index 65.30 mL/m2    LV Mass Index 163 g/m2    Triscuspid Valve Regurgitation Peak Gradient 11 mmHg    LA WIDTH 2.50 cm    LA volume 27.97 cm3    Sinus 3.58 cm    TAPSE 1.44 cm    LA Volume Index 13.6 mL/m2    RA Width 4.21 cm    EF 15 %    Narrative    · The left ventricle is mildly enlarged with moderate concentric   hypertrophy and severely decreased systolic function.  · The estimated ejection fraction is 15%.  · Grade I left ventricular diastolic dysfunction.  · There are segmental left ventricular wall motion abnormalities.  · Normal right ventricular size with moderately reduced right ventricular   systolic function.  · Mild tricuspid  regurgitation.  · Mechanically ventilated; cannot use inferior caval vein diameter to   estimate central venous pressure.  · The aortic root is mildly dilated.      , EKG: Reviewed, and X-Ray: CXR: X-Ray Chest 1 View (CXR): No results found for this visit on 05/22/22. and X-Ray Chest PA and Lateral (CXR): No results found for this visit on 05/22/22.

## 2022-05-24 NOTE — SUBJECTIVE & OBJECTIVE
Review of Systems   Reason unable to perform ROS: limited by paranoia.      Objective:     Vital Signs (Most Recent):  Temp: 100 °F (37.8 °C) (05/24/22 0315)  Pulse: 84 (05/24/22 0657)  Resp: (!) 24 (05/24/22 0657)  BP: (!) 94/55 (05/24/22 0500)  SpO2: 100 % (05/24/22 0657)   Vital Signs (24h Range):  Temp:  [98 °F (36.7 °C)-100.3 °F (37.9 °C)] 100 °F (37.8 °C)  Pulse:  [78-98] 84  Resp:  [11-50] 24  SpO2:  [91 %-100 %] 100 %  BP: ()/(55-94) 94/55     Weight: 88.2 kg (194 lb 7.1 oz)  Body mass index is 29.57 kg/m².      Intake/Output Summary (Last 24 hours) at 5/24/2022 0702  Last data filed at 5/24/2022 0133  Gross per 24 hour   Intake 374.98 ml   Output 2050 ml   Net -1675.02 ml              Physical Exam  Vitals and nursing note reviewed.   Constitutional:       General: He is awake. He is not in acute distress.     Appearance: He is overweight. He is ill-appearing.   HENT:      Head: Atraumatic.   Eyes:      Conjunctiva/sclera: Conjunctivae normal.      Pupils: Pupils are equal, round, and reactive to light.   Neck:      Vascular: No JVD.   Cardiovascular:      Rate and Rhythm: Normal rate and regular rhythm.      Pulses:           Radial pulses are 2+ on the right side and 2+ on the left side.        Dorsalis pedis pulses are 1+ on the right side and 1+ on the left side.   Pulmonary:      Breath sounds: No wheezing, rhonchi or rales.   Abdominal:      General: Bowel sounds are normal. There is no distension.      Palpations: Abdomen is soft.   Musculoskeletal:      Right lower leg: No edema.      Left lower leg: No edema.   Skin:     General: Skin is warm and dry.      Capillary Refill: Capillary refill takes less than 2 seconds.   Neurological:      Mental Status: He is alert.      GCS: GCS eye subscore is 4. GCS verbal subscore is 5. GCS motor subscore is 6.   Psychiatric:         Mood and Affect: Affect is labile.         Speech: Speech normal.         Thought Content: Thought content is paranoid.          Lines/Drains/Airways       Peripherally Inserted Central Catheter Line  Duration             PICC Double Lumen 05/23/22 0425 left basilic 1 day              Peripheral Intravenous Line  Duration                  Peripheral IV - Single Lumen 05/22/22 1710 20 G Right Antecubital 1 day         Peripheral IV - Single Lumen 05/22/22 1815 20 G Right;Posterior Hand 1 day         Peripheral IV - Single Lumen 05/23/22 0030 20 G Left;Posterior Hand 1 day                    Significant Labs:    CBC/Anemia Profile:  Recent Labs   Lab 05/22/22  1805 05/23/22  0545 05/24/22  0511   WBC 11.42 14.12* 12.60   HGB 14.2 12.5* 12.3*   HCT 40.3 35.2* 35.5*    151 116*   MCV 90 88 92   RDW 14.0 13.8 14.3          Chemistries:  Recent Labs   Lab 05/22/22  1805 05/23/22  0256 05/23/22  0545 05/23/22  0806 05/23/22  1532 05/24/22  0511   *   < > 142  --  144 144   K 3.3*   < > 3.0*  --  3.7 4.1   *   < > 106  --  111* 115*   CO2 18*   < > 27  --  25 23   BUN 14   < > 15  --  14 12   CREATININE 1.6*   < > 1.0  --  1.1 1.1   CALCIUM 8.7   < > 8.4*  --  9.1 9.1   ALBUMIN 3.2*  --   --   --   --   --    PROT 6.0  --   --   --   --   --    BILITOT 1.3*  --   --   --   --   --    ALKPHOS 41*  --   --   --   --   --    ALT 16  --   --   --   --   --    AST 27  --   --   --   --   --    MG  --    < > 1.5* 1.5* 2.5 2.5   PHOS  --   --   --  1.8* 2.0*  --     < > = values in this interval not displayed.         All pertinent labs within the past 24 hours have been reviewed.    Significant Imaging:  I have reviewed all pertinent imaging results/findings within the past 24 hours.

## 2022-05-24 NOTE — HOSPITAL COURSE
5/24/22-Patient seen and examined today, resting in bed. Alert but intermittently confused, paranoid at times. Appears comfortable. Discussed continuing OMT for now, can consider ischemic evaluation pending mental status. Unsure if he would tolerate/wear LifeVest.    5/25/22-Patient seen and examined today, resting in bed. Wife at bedside. Much more alert, oriented x 3. Feels ok. No chest pain. Febrile overnight, CXR concerning for PNA, abx initiated. Patient declined LHC, agreeable to MPI stress test. Discussed LifeVest, patient likely will decline. Results pending. Labs stable.    5/26/22-Patient seen and examined today, lying in bed. Feels well. Wants to go home. No chest pain or SOB. MPI stress test no ischemia, +fixed defects. Does not want LifeVest. Labs stable.

## 2022-05-24 NOTE — CONSULTS
"  Consults  Consult Start Time: 05/24/2022 11:00 CDT  Consult End Time: 05/24/2022 12:10 CDT        Inpatient Telepsychiatry Consult    The chief complaint leading to psychiatric consultation is: overdose  This consultation is from the patient's treating physician Dr. Sadi Laurent  Start time of consultation 11:00 am    Patient Identification:  Tristin Saha is a 62 y.o. male.    Patient information was obtained from patient.    History of Present Illness:    From hospital medicine note from yesterday:  "HPI:  5/22/2022, 5:13 PM  History obtained from the [facility]  63 y/o M with PMH of bipolar, schizophrenia with prior suicide attempts here with suspected overdose. He asked the wife to stay at another house and when she got home, she found him unconscious with evidence of recent vomiting .EMS noted  noted rapid breathing, hot environment, and patient only responding to painful stimuli. He had an empty bottle of Risperdal next to him.  In the ED,  he was covered in vomit, gurgling respirations, and only localizing to pain, grunting, and not opening his eyes. He was breathing fast, in the 30's. Sats on bag valve mask were in the mid 90's. Temp was 103 ºF rectally.   Intubated for airway protection.   Lab and imaging test reviewed.  Component      Latest Ref Rng & Units 5/23/2022 5/22/2022   POC PH      7.35 - 7.45 7.494 (H) 7.278 (LL)   POC PCO2      35 - 45 mmHg 28.9 (LL) 43.9   POC PO2      80 - 100 mmHg 111 (H) 223 (H)   CT head with no acute findings. XR chest with possible aspiration.  ED work up -  He was acidotic, QRS was >100, we started bicarb at 250 cc/hr, and gave fluid bolus. Spoke with poison control, no recommendations for dantrolene as he was not having rigitity.   He will be admitted to the ICU.  Overview/Hospital Course:  Admitted to ICU overnight on mechanical ventilation for further management of intentional drug overdose. Empty bottle of Risperdal was found at home. QTc 428 on EKG. CK 1138>1123, " "troponin 0.050>0.184. Lactic acid normalized 1.9>4.4>3.9. Currently requiring minimal vent support.   -Extubated today after successful SAT/SBT. Somnolent , but awaken easily, oriented to self and person. Pt verbalized taking 8 pills of Risperdal intentionally. Will consult Tele-Psych today.  WBC 14K, Hgb 12.5, Plt 151. Troponin bumped to 0.184. Will get an echocardiogram. Continue ASA, empiric  antibiotic targeting aspiration pneumonia. Creatinine improved to normal 1.0>1.6. Metabolic acidosis resolved.    Interval History:  Extubated today. Will consult Tele Psych. Pt is PECed"    On interview by me today:  Pt. Appears confused, is unable to give coherent history.  Able to state name and , knows month, states year ; states that he is at [facility].  States, the overdosed prior to current presentation.  Denies SI/HI/AH's.  Denies alcohol/drug.    Wife, Zee, [phone]: about a month ago psychiatrically hospitalized, discharged : Zyprexa was changed to Risperdal.  Is afraid of taking too much medication. Reportedly took 8 tablets of Risperdal prior to current presentation. Recently at home has said, that he was not going to kill himself. Gets paranoid; if he does not sleep he gets more paranoid. No alcohol/drug. Wife does not know of a h/o stroke in the patient. Receives psychiatric care at [facility] in [place].    Psychiatric History:  Please see psychiatric H&P from 2017 and psychiatric discharge summary from 2017  Hospitalization: states most recent was about a month ago    Past Medical History:   Past Medical History:   Diagnosis Date    Depression      Allergies:   Review of patient's allergies indicates:   Allergen Reactions    Haldol [haloperidol lactate]      Shaking      Lamictal [lamotrigine]     Trileptal [oxcarbazepine]      Medications:  Scheduled Meds:    aspirin  81 mg Per NG tube Daily    atorvastatin  40 mg Oral QHS    busPIRone  15 mg Oral BID "    carvediloL  3.125 mg Oral BID    chlorhexidine  15 mL Mouth/Throat BID    enoxaparin  40 mg Subcutaneous Daily    famotidine  20 mg Oral BID    mupirocin   Nasal BID    polyethylene glycol  17 g Oral BID    risperiDONE  0.5 mg Oral BID    sacubitriL-valsartan  1 tablet Oral QHS    sodium chloride 0.9%  10 mL Intravenous Q8H      PRN Meds: acetaminophen, dextrose 10%, dextrose 10%, glucagon (human recombinant), glucose, glucose, influenza, melatonin, naloxone   Psychotherapeutics (From admission, onward)            Start     Stop Route Frequency Ordered    05/24/22 0915  risperiDONE tablet 0.5 mg         -- Oral 2 times daily 05/24/22 0906    05/24/22 0915  busPIRone tablet 15 mg         -- Oral 2 times daily 05/24/22 0907        Family History: No family history on file.    Current Evaluation:     Constitutional  Vitals:  Vitals:    05/24/22 0400 05/24/22 0415 05/24/22 0430 05/24/22 0445   BP: 109/62      Pulse: 89 94 78 79   Resp: 17 (!) 47 20 (!) 22   Temp:       TempSrc:       SpO2: (!) 92% 96% 100% 100%   Weight:       Height:        05/24/22 0500 05/24/22 0515 05/24/22 0530 05/24/22 0657   BP: (!) 94/55      Pulse: 81 81 84 84   Resp: (!) 21 17 (!) 31 (!) 24   Temp:       TempSrc:       SpO2: 100% (!) 93% 96% 100%   Weight: 88.2 kg (194 lb 7.1 oz)      Height:        05/24/22 0700 05/24/22 0706 05/24/22 0800 05/24/22 0833   BP:  118/72 118/68    Pulse: 84 91 83 88   Resp: (!) 30 (!) 48 (!) 25 (!) 37   Temp:  98.9 °F (37.2 °C)     TempSrc:  Oral     SpO2: 97% (!) 93% 100% 96%   Weight:       Height:        05/24/22 0900 05/24/22 0901 05/24/22 1011   BP: 131/80  121/82   Pulse: 83 86 78   Resp: (!) 48 (!) 57 18   Temp:   98.9 °F (37.2 °C)   TempSrc:      SpO2: 99% 99% 95%   Weight:      Height:         General:  Appears stated age     Psychiatric  Level of Consciousness: alert  Orientation: knows the month, says the year is 2023, says he is at [facility]  Psychomotor Behavior: somewhat  restless  Speech: normal r/r/v  Language: normal use of words  Affect: constricted  Thought Process: some disorganization  Associations: some looseness  Thought Content: currently denies SI, denies HI  Attention: appears to have some difficulty attending to interview  Insight: appears limited  Judgement: appears limited    Laboratory Data:   Recent Results (from the past 36 hour(s))   ISTAT PROCEDURE    Collection Time: 05/23/22 12:03 AM   Result Value Ref Range    POC PH 7.494 (H) 7.35 - 7.45    POC PCO2 28.9 (LL) 35 - 45 mmHg    POC PO2 111 (H) 80 - 100 mmHg    POC HCO3 22.2 (L) 24 - 28 mmol/L    POC BE -1 -2 to 2 mmol/L    POC SATURATED O2 99 95 - 100 %    Rate 24     Sample ARTERIAL     Site LR     Allens Test Pass     DelSys Adult Vent     Mode AC/PRVC     Vt 440     PEEP 5     FiO2 50    Lactic acid, plasma    Collection Time: 05/23/22  2:56 AM   Result Value Ref Range    Lactate (Lactic Acid) 4.4 (HH) 0.5 - 2.2 mmol/L   Troponin I    Collection Time: 05/23/22  2:56 AM   Result Value Ref Range    Troponin I 0.167 (H) 0.000 - 0.026 ng/mL   Basic metabolic panel    Collection Time: 05/23/22  2:56 AM   Result Value Ref Range    Sodium 142 136 - 145 mmol/L    Potassium 3.6 3.5 - 5.1 mmol/L    Chloride 107 95 - 110 mmol/L    CO2 20 (L) 23 - 29 mmol/L    Glucose 181 (H) 70 - 110 mg/dL    BUN 15 8 - 23 mg/dL    Creatinine 1.3 0.5 - 1.4 mg/dL    Calcium 8.4 (L) 8.7 - 10.5 mg/dL    Anion Gap 15 8 - 16 mmol/L    eGFR if African American >60 >60 mL/min/1.73 m^2    eGFR if non African American 58 (A) >60 mL/min/1.73 m^2   CK    Collection Time: 05/23/22  2:56 AM   Result Value Ref Range    CPK 1123 (H) 20 - 200 U/L   ISTAT PROCEDURE    Collection Time: 05/23/22  3:34 AM   Result Value Ref Range    POC PH 7.516 (H) 7.35 - 7.45    POC PCO2 32.0 (L) 35 - 45 mmHg    POC PO2 74 (L) 80 - 100 mmHg    POC HCO3 25.9 24 - 28 mmol/L    POC BE 3 -2 to 2 mmol/L    POC SATURATED O2 96 95 - 100 %    Rate 20     Sample ARTERIAL     Site  RR     Allens Test Pass     DelSys Adult Vent     Mode AC/PRVC     Vt 440     PEEP 5     FiO2 40    Basic Metabolic Panel    Collection Time: 05/23/22  5:45 AM   Result Value Ref Range    Sodium 142 136 - 145 mmol/L    Potassium 3.0 (L) 3.5 - 5.1 mmol/L    Chloride 106 95 - 110 mmol/L    CO2 27 23 - 29 mmol/L    Glucose 158 (H) 70 - 110 mg/dL    BUN 15 8 - 23 mg/dL    Creatinine 1.0 0.5 - 1.4 mg/dL    Calcium 8.4 (L) 8.7 - 10.5 mg/dL    Anion Gap 9 8 - 16 mmol/L    eGFR if African American >60 >60 mL/min/1.73 m^2    eGFR if non African American >60 >60 mL/min/1.73 m^2   CBC Auto Differential    Collection Time: 05/23/22  5:45 AM   Result Value Ref Range    WBC 14.12 (H) 3.90 - 12.70 K/uL    RBC 4.00 (L) 4.60 - 6.20 M/uL    Hemoglobin 12.5 (L) 14.0 - 18.0 g/dL    Hematocrit 35.2 (L) 40.0 - 54.0 %    MCV 88 82 - 98 fL    MCH 31.3 (H) 27.0 - 31.0 pg    MCHC 35.5 32.0 - 36.0 g/dL    RDW 13.8 11.5 - 14.5 %    Platelets 151 150 - 450 K/uL    MPV 9.1 (L) 9.2 - 12.9 fL    Immature Granulocytes 0.7 (H) 0.0 - 0.5 %    Gran # (ANC) 11.1 (H) 1.8 - 7.7 K/uL    Immature Grans (Abs) 0.10 (H) 0.00 - 0.04 K/uL    Lymph # 2.0 1.0 - 4.8 K/uL    Mono # 0.9 0.3 - 1.0 K/uL    Eos # 0.0 0.0 - 0.5 K/uL    Baso # 0.03 0.00 - 0.20 K/uL    nRBC 0 0 /100 WBC    Gran % 78.6 (H) 38.0 - 73.0 %    Lymph % 14.0 (L) 18.0 - 48.0 %    Mono % 6.4 4.0 - 15.0 %    Eosinophil % 0.1 0.0 - 8.0 %    Basophil % 0.2 0.0 - 1.9 %    Platelet Estimate Appears normal     Ovalocytes Occasional     Large/Giant Platelets Present     Differential Method Automated    Magnesium    Collection Time: 05/23/22  5:45 AM   Result Value Ref Range    Magnesium 1.5 (L) 1.6 - 2.6 mg/dL   Culture, Respiratory with Gram Stain    Collection Time: 05/23/22  7:17 AM    Specimen: Sputum; Respiratory   Result Value Ref Range    Gram Stain (Respiratory) <10 epithelial cells per low power field.     Gram Stain (Respiratory) Rare WBC's     Gram Stain (Respiratory) Few Gram positive cocci     Lactic Acid, Plasma    Collection Time: 05/23/22  8:06 AM   Result Value Ref Range    Lactate (Lactic Acid) 1.9 0.5 - 2.2 mmol/L   Troponin I    Collection Time: 05/23/22  8:06 AM   Result Value Ref Range    Troponin I 0.184 (H) 0.000 - 0.026 ng/mL   Magnesium    Collection Time: 05/23/22  8:06 AM   Result Value Ref Range    Magnesium 1.5 (L) 1.6 - 2.6 mg/dL   Phosphorus    Collection Time: 05/23/22  8:06 AM   Result Value Ref Range    Phosphorus 1.8 (L) 2.7 - 4.5 mg/dL   Echo    Collection Time: 05/23/22 10:57 AM   Result Value Ref Range    BSA 2.1 m2    IVRT [id number] msec    TDI LATERAL 0.08 m/s    Left Ventricular Outflow Tract Mean Gradient 2.15 mmHg    Left Ventricular Outflow Tract Mean Velocity 0.9178745076 cm/s    MV stenosis pressure 1/2 time 82.851583019172157 ms    TR Max Daniel 1.69 m/s    IVC diameter 2.14 cm    Ao root annulus 4.07 cm    Ao peak daniel 1.17 m/s    Posterior Wall 1.50 (A) 0.6 - 1.1 cm    LVOT diameter 2.45 cm    LVOT peak daniel 0.96 m/s    LVOT peak VTI 18.80 cm    E wave deceleration time [id number] msec    MV Peak A Daniel 0.99 m/s    MV Peak E Daniel 0.77 m/s    RA Major Axis 4.94 cm    PV mean gradient 0.72 mmHg    RVOT peak daniel 0.60 m/s    RVOT peak VTI 10.0 cm    IVS 1.41 (A) 0.6 - 1.1 cm    Ao VTI 20.1 cm    AV mean gradient 3 mmHg    LVIDd 5.27 3.5 - 6.0 cm    LVIDs 4.25 (A) 2.1 - 4.0 cm    Left Atrium Major Axis 5.08 cm    Left Atrium Minor Axis 4.97 cm    STJ 2.96 cm    Ascending aorta 3.13 cm    LV Systolic Volume 80.83 mL    LV Diastolic Volume 133.86 mL    TDI SEPTAL 0.10 m/s    LA size 2.62 cm    LV LATERAL E/E' RATIO 9.63 m/s    LV SEPTAL E/E' RATIO 7.70 m/s    FS 19 %    LV mass 334.22 g    Left Ventricle Relative Wall Thickness 0.57 cm    AV valve area 4.41 cm2    AV Velocity Ratio 0.82     AV index (prosthetic) 0.94     MV valve area p 1/2 method 2.68 cm2    E/A ratio 0.78     Mean e' 0.09 m/s    LVOT area 4.7 cm2    LVOT stroke volume 88.58 cm3    AV peak  gradient 5 mmHg    E/E' ratio 8.56 m/s    LV Systolic Volume Index 39.4 mL/m2    LV Diastolic Volume Index 65.30 mL/m2    LV Mass Index 163 g/m2    Triscuspid Valve Regurgitation Peak Gradient 11 mmHg    LA WIDTH 2.50 cm    LA volume 27.97 cm3    Sinus 3.58 cm    TAPSE 1.44 cm    LA Volume Index 13.6 mL/m2    RA Width 4.21 cm    EF 15 %   POCT glucose    Collection Time: 05/23/22 11:28 AM   Result Value Ref Range    POCT Glucose 105 70 - 110 mg/dL   Basic Metabolic Panel    Collection Time: 05/23/22  3:32 PM   Result Value Ref Range    Sodium 144 136 - 145 mmol/L    Potassium 3.7 3.5 - 5.1 mmol/L    Chloride 111 (H) 95 - 110 mmol/L    CO2 25 23 - 29 mmol/L    Glucose 95 70 - 110 mg/dL    BUN 14 8 - 23 mg/dL    Creatinine 1.1 0.5 - 1.4 mg/dL    Calcium 9.1 8.7 - 10.5 mg/dL    Anion Gap 8 8 - 16 mmol/L    eGFR if African American >60 >60 mL/min/1.73 m^2    eGFR if non African American >60 >60 mL/min/1.73 m^2   Magnesium    Collection Time: 05/23/22  3:32 PM   Result Value Ref Range    Magnesium 2.5 1.6 - 2.6 mg/dL   Phosphorus    Collection Time: 05/23/22  3:32 PM   Result Value Ref Range    Phosphorus 2.0 (L) 2.7 - 4.5 mg/dL   POCT glucose    Collection Time: 05/23/22  5:05 PM   Result Value Ref Range    POCT Glucose 108 70 - 110 mg/dL   POCT glucose    Collection Time: 05/24/22 12:00 AM   Result Value Ref Range    POCT Glucose 102 70 - 110 mg/dL   POCT glucose    Collection Time: 05/24/22  5:10 AM   Result Value Ref Range    POCT Glucose 101 70 - 110 mg/dL   CBC Auto Differential    Collection Time: 05/24/22  5:11 AM   Result Value Ref Range    WBC 12.60 3.90 - 12.70 K/uL    RBC 3.85 (L) 4.60 - 6.20 M/uL    Hemoglobin 12.3 (L) 14.0 - 18.0 g/dL    Hematocrit 35.5 (L) 40.0 - 54.0 %    MCV 92 82 - 98 fL    MCH 31.9 (H) 27.0 - 31.0 pg    MCHC 34.6 32.0 - 36.0 g/dL    RDW 14.3 11.5 - 14.5 %    Platelets 116 (L) 150 - 450 K/uL    MPV 9.8 9.2 - 12.9 fL    Immature Granulocytes 0.9 (H) 0.0 - 0.5 %    Gran # (ANC) 9.5 (H)  1.8 - 7.7 K/uL    Immature Grans (Abs) 0.11 (H) 0.00 - 0.04 K/uL    Lymph # 2.2 1.0 - 4.8 K/uL    Mono # 0.7 0.3 - 1.0 K/uL    Eos # 0.1 0.0 - 0.5 K/uL    Baso # 0.06 0.00 - 0.20 K/uL    nRBC 0 0 /100 WBC    Gran % 75.2 (H) 38.0 - 73.0 %    Lymph % 17.6 (L) 18.0 - 48.0 %    Mono % 5.2 4.0 - 15.0 %    Eosinophil % 0.6 0.0 - 8.0 %    Basophil % 0.5 0.0 - 1.9 %    Platelet Estimate Appears normal     Aniso Slight     Poik Slight     Ovalocytes Occasional     Differential Method Automated    Magnesium    Collection Time: 05/24/22  5:11 AM   Result Value Ref Range    Magnesium 2.5 1.6 - 2.6 mg/dL   Basic Metabolic Panel    Collection Time: 05/24/22  5:11 AM   Result Value Ref Range    Sodium 144 136 - 145 mmol/L    Potassium 4.1 3.5 - 5.1 mmol/L    Chloride 115 (H) 95 - 110 mmol/L    CO2 23 23 - 29 mmol/L    Glucose 91 70 - 110 mg/dL    BUN 12 8 - 23 mg/dL    Creatinine 1.1 0.5 - 1.4 mg/dL    Calcium 9.1 8.7 - 10.5 mg/dL    Anion Gap 6 (L) 8 - 16 mmol/L    eGFR if African American >60 >60 mL/min/1.73 m^2    eGFR if non African American >60 >60 mL/min/1.73 m^2   CK    Collection Time: 05/24/22  5:11 AM   Result Value Ref Range     (H) 20 - 200 U/L   Troponin I    Collection Time: 05/24/22  5:11 AM   Result Value Ref Range    Troponin I 0.117 (H) 0.000 - 0.026 ng/mL        Assessment - Diagnosis - Goals:     Impression:  Overdose  Altered mental state[possible delirium]  Schizoaffective d/o[by history]  Platelets today 116K    Case d/w Dr. Laurent.    Recommendations:   - continue PEC/CEC  - please have sitter with pt. At all times  - discontinue Buspar  - continue Risperdal 0.5 mg bid for now  - Zyprexa 2.5 mg p.o./i.m. q8h prn for agitation  - follow EKG/QTc if pt. Receives Zyprexa    Total time, including chart review, time with patient, obtaining collateral info[if possible]: 70 min

## 2022-05-24 NOTE — ASSESSMENT & PLAN NOTE
Vs pneumonitis  on empiric zosyn  Wbc normalized; on room air; sputum culture unremarkable  Will d/c zosyn

## 2022-05-24 NOTE — PLAN OF CARE
POC reviewed. PT remains PED'd requiring 1:1 observation. Pt remains confused but none confrontational. VSS throughout shift. Pt is redirectable mostly. Report given to day shift RN who will assume care.

## 2022-05-24 NOTE — PROGRESS NOTES
[facility] ([facility])  Critical Care Medicine  Progress Note    Patient Name: Tristin Saha  MRN: [id number]  Admission Date: 5/22/2022  Hospital Length of Stay: 2 days  Code Status: Full Code  Attending Physician: Sadi Laurent MD   Primary Care Provider: Shari Colon MD   Principal Problem: Intentional drug overdose    Subjective:     HPI:  62 year old male with PMH including bipolar disorder; schizophrenia, and prior suicide attempts    Presented to ED via EMS called when spouse found him unconscious with evidence of vomiting. EMS noted rapid breathing, response only to pain, and an empty bottle of risperdal near pt.  Last known normal state near 24 hours prior to ED presentation    Required intubation for airway protection in ED, temp 103 rectally, initial pH 7.278  Labs revealed unremarkable cbc; Na 146; K 3.3; creatinine 1.6; CPK 1138; lactate 3.9  Tylenol, lithium, and salicylate levels all less than measurable   UDS without any positive findings  CXR showed LLL atelectasis and perihilar infiltrates by my interpretation      Hospital/ICU Course:  Admitted to ICU overnight on mechanical vent with low dose pressor support, minimal sedation  5/23 seen and examined sedated on vent at start of shift; echo revealed 15% EF; extubated successfully  5/24 no acute issues after extubation; remains PEC for suicide attempt; cardiology following for elevated tropinin, reduced EF      Review of Systems   Reason unable to perform ROS: limited by paranoia.      Objective:     Vital Signs (Most Recent):  Temp: 100 °F (37.8 °C) (05/24/22 0315)  Pulse: 84 (05/24/22 0657)  Resp: (!) 24 (05/24/22 0657)  BP: (!) 94/55 (05/24/22 0500)  SpO2: 100 % (05/24/22 0657)   Vital Signs (24h Range):  Temp:  [98 °F (36.7 °C)-100.3 °F (37.9 °C)] 100 °F (37.8 °C)  Pulse:  [78-98] 84  Resp:  [11-50] 24  SpO2:  [91 %-100 %] 100 %  BP: ()/(55-94) 94/55     Weight: 88.2 kg (194 lb 7.1 oz)  Body mass index is 29.57  kg/m².      Intake/Output Summary (Last 24 hours) at 5/24/2022 0702  Last data filed at 5/24/2022 0133  Gross per 24 hour   Intake 374.98 ml   Output 2050 ml   Net -1675.02 ml              Physical Exam  Vitals and nursing note reviewed.   Constitutional:       General: He is awake. He is not in acute distress.     Appearance: He is overweight. He is ill-appearing.   HENT:      Head: Atraumatic.   Eyes:      Conjunctiva/sclera: Conjunctivae normal.      Pupils: Pupils are equal, round, and reactive to light.   Neck:      Vascular: No JVD.   Cardiovascular:      Rate and Rhythm: Normal rate and regular rhythm.      Pulses:           Radial pulses are 2+ on the right side and 2+ on the left side.        Dorsalis pedis pulses are 1+ on the right side and 1+ on the left side.   Pulmonary:      Breath sounds: No wheezing, rhonchi or rales.   Abdominal:      General: Bowel sounds are normal. There is no distension.      Palpations: Abdomen is soft.   Musculoskeletal:      Right lower leg: No edema.      Left lower leg: No edema.   Skin:     General: Skin is warm and dry.      Capillary Refill: Capillary refill takes less than 2 seconds.   Neurological:      Mental Status: He is alert.      GCS: GCS eye subscore is 4. GCS verbal subscore is 5. GCS motor subscore is 6.   Psychiatric:         Mood and Affect: Affect is labile.         Speech: Speech normal.         Thought Content: Thought content is paranoid.         Lines/Drains/Airways       Peripherally Inserted Central Catheter Line  Duration             PICC Double Lumen 05/23/22 0425 left basilic 1 day              Peripheral Intravenous Line  Duration                  Peripheral IV - Single Lumen 05/22/22 1710 20 G Right Antecubital 1 day         Peripheral IV - Single Lumen 05/22/22 1815 20 G Right;Posterior Hand 1 day         Peripheral IV - Single Lumen 05/23/22 0030 20 G Left;Posterior Hand 1 day                    Significant Labs:    CBC/Anemia Profile:  Recent  Labs   Lab 05/22/22  1805 05/23/22  0545 05/24/22  0511   WBC 11.42 14.12* 12.60   HGB 14.2 12.5* 12.3*   HCT 40.3 35.2* 35.5*    151 116*   MCV 90 88 92   RDW 14.0 13.8 14.3          Chemistries:  Recent Labs   Lab 05/22/22  1805 05/23/22  0256 05/23/22  0545 05/23/22  0806 05/23/22  1532 05/24/22  0511   *   < > 142  --  144 144   K 3.3*   < > 3.0*  --  3.7 4.1   *   < > 106  --  111* 115*   CO2 18*   < > 27  --  25 23   BUN 14   < > 15  --  14 12   CREATININE 1.6*   < > 1.0  --  1.1 1.1   CALCIUM 8.7   < > 8.4*  --  9.1 9.1   ALBUMIN 3.2*  --   --   --   --   --    PROT 6.0  --   --   --   --   --    BILITOT 1.3*  --   --   --   --   --    ALKPHOS 41*  --   --   --   --   --    ALT 16  --   --   --   --   --    AST 27  --   --   --   --   --    MG  --    < > 1.5* 1.5* 2.5 2.5   PHOS  --   --   --  1.8* 2.0*  --     < > = values in this interval not displayed.         All pertinent labs within the past 24 hours have been reviewed.    Significant Imaging:  I have reviewed all pertinent imaging results/findings within the past 24 hours.      AB  Recent Labs   Lab 05/23/22  0334   PH 7.516*   PO2 74*   PCO2 32.0*   HCO3 25.9   BE 3     Assessment/Plan:     Psychiatric  * Intentional drug overdose  Will need inpt psych care on discharge  PEC in place; maintain line of site monitoring     Schizoaffective disorder, bipolar type  Lithium level not measurable (low); likely non compliance with mental health care plan contributing to suicide attempt    Pulmonary  On mechanically assisted ventilation, S/P Extubation 5/23/22  Successful extubation 5/23    Aspiration pneumonia  Vs pneumonitis  on empiric zosyn  Wbc normalized; on room air; sputum culture unremarkable  Will d/c zosyn    Acute hypoxemic respiratory failure  S/t intentional overdose  Successful extubation 5/23  Room air  resolved    Cardiac/Vascular  Acute combined systolic and diastolic congestive heart failure  Echo shows 15% EF and  WMA  Cardiology following  Optimize medical therapy  May need ischemic eval +/- life vest therapy      Critical Care Daily Checklist:    A: Awake: RASS Goal/Actual Goal: RASS Goal: 0-->alert and calm  Actual: Taylor Agitation Sedation Scale (RASS): Restless   B: Spontaneous Breathing Trial Performed? Spon. Breathing Trial Initiated?: Initiated (05/23/22 0945)   C: SAT & SBT Coordinated?  5/23                    D: Delirium: CAM-ICU Overall CAM-ICU: Positive   E: Early Mobility Performed? Yes   F: Feeding Goal: Goals: 1. Enteral Nutrition initiation within 24 hours.  Status: Nutrition Goal Status: new   Current Diet Order   Procedures    Diet Cardiac     1.5 Liter fluid restriction      AS: Analgesia/Sedation none   T: Thromboembolic Prophylaxis lovenox   H: HOB > 300 Yes   U: Stress Ulcer Prophylaxis (if needed) pepcid   G: Glucose Control monitor   B: Bowel Function Stool Occurrence: 1   I: Indwelling Catheter (Lines & Fletcher) Necessity reviewed   D: De-escalation of Antimicrobials/Pharmacotherapies reviewed    Plan for the day/ETD Transfer out of ICU    Code Status:  Family/Goals of Care: Full Code  To inpt psych care on discharge   I have discussed case and plan of care in detail with Dr Cosme and Dr Laurent; Status and plan of care were discussed with team on multidisciplinary rounds.    Ok for transfer from pulmonary standpoint; will sign off. Please call if we can be of additional assistance.     POLINA Balbuena-BC  Critical Care Medicine  [facility] ([facility])

## 2022-05-24 NOTE — PROGRESS NOTES
[facility]  Cardiology  Progress Note    Patient Name: Tristin Saha  MRN: [id number]  Admission Date: 5/22/2022  Hospital Length of Stay: 2 days  Code Status: Full Code   Attending Physician: Sadi Laurent MD   Primary Care Physician: Shari Colon MD  Expected Discharge Date:   Principal Problem:Intentional drug overdose    Subjective:   HPI:  5/22/2022, 5:13 PM  History obtained from the [facility]     61 y/o M with PMH of bipolar, schizophrenia with prior suicide attempts here with suspected overdose. He asked the wife to stay at another house and when she got home, she found him unconscious with evidence of recent vomiting .EMS noted  noted rapid breathing, hot environment, and patient only responding to painful stimuli. He had an empty bottle of Risperdal next to him.  In the ED,  he was covered in vomit, gurgling respirations, and only localizing to pain, grunting, and not opening his eyes. He was breathing fast, in the 30's. Sats on bag valve mask were in the mid 90's. Temp was 103 ºF rectally.   Intubated for airway protection.   Lab and imaging test reviewed.  Component      Latest Ref Rng & Units 5/23/2022 5/22/2022   POC PH      7.35 - 7.45 7.494 (H) 7.278 (LL)   POC PCO2      35 - 45 mmHg 28.9 (LL) 43.9   POC PO2      80 - 100 mmHg 111 (H) 223 (H)      CT head with no acute findings. XR chest with possible aspiration.  ED work up -  He was acidotic, QRS was >100, we started bicarb at 250 cc/hr, and gave fluid bolus. Spoke with poison control, no recommendations for dantrolene as he was not having rigitity.   He will be admitted to the ICU.      Hospital Course:   5/24/22-Patient seen and examined today, resting in bed. Alert but intermittently confused, paranoid at times. Appears comfortable. Discussed continuing OMT for now, can consider ischemic evaluation pending mental status. Unsure if he would tolerate/wear LifeVest.        Review of Systems   Reason unable to perform ROS: limited due to  paranoia/confusion.   Objective:     Vital Signs (Most Recent):  Temp: 98.8 °F (37.1 °C) (05/24/22 1217)  Pulse: 79 (05/24/22 1217)  Resp: 17 (05/24/22 1217)  BP: 122/78 (05/24/22 1217)  SpO2: (!) 94 % (05/24/22 1217)   Vital Signs (24h Range):  Temp:  [98.6 °F (37 °C)-100.3 °F (37.9 °C)] 98.8 °F (37.1 °C)  Pulse:  [78-98] 79  Resp:  [17-57] 17  SpO2:  [91 %-100 %] 94 %  BP: ()/(55-94) 122/78     Weight: 88.2 kg (194 lb 7.1 oz)  Body mass index is 29.57 kg/m².     SpO2: (!) 94 %  O2 Device (Oxygen Therapy): room air      Intake/Output Summary (Last 24 hours) at 5/24/2022 1347  Last data filed at 5/24/2022 0900  Gross per 24 hour   Intake 527.67 ml   Output 1075 ml   Net -547.33 ml       Lines/Drains/Airways       Peripherally Inserted Central Catheter Line  Duration             PICC Double Lumen 05/23/22 0425 left basilic 1 day              Peripheral Intravenous Line  Duration                  Peripheral IV - Single Lumen 05/22/22 1710 20 G Right Antecubital 1 day         Peripheral IV - Single Lumen 05/22/22 1815 20 G Right;Posterior Hand 1 day         Peripheral IV - Single Lumen 05/23/22 0030 20 G Left;Posterior Hand 1 day                    Physical Exam  Vitals and nursing note reviewed.   Constitutional:       General: He is not in acute distress.     Appearance: He is well-developed. He is not diaphoretic.   HENT:      Head: Normocephalic and atraumatic.   Eyes:      General:         Right eye: No discharge.         Left eye: No discharge.      Pupils: Pupils are equal, round, and reactive to light.   Neck:      Thyroid: No thyromegaly.      Vascular: No JVD.      Trachea: No tracheal deviation.   Cardiovascular:      Rate and Rhythm: Normal rate and regular rhythm.      Heart sounds: Normal heart sounds, S1 normal and S2 normal. No murmur heard.  Pulmonary:      Effort: Pulmonary effort is normal. No respiratory distress.      Breath sounds: Normal breath sounds. No wheezing or rales.   Abdominal:       General: There is no distension.      Palpations: Abdomen is soft.      Tenderness: There is no rebound.   Musculoskeletal:      Cervical back: Neck supple.   Skin:     General: Skin is warm and dry.      Findings: No erythema.   Neurological:      Mental Status: He is alert.      Comments: Oriented to self  Intermittently confused/paranoid   Psychiatric:         Mood and Affect: Mood normal.         Behavior: Behavior normal.       Significant Labs: CMP   Recent Labs   Lab 05/22/22  1805 05/23/22  0256 05/23/22  0545 05/23/22  1532 05/24/22  0511   *   < > 142 144 144   K 3.3*   < > 3.0* 3.7 4.1   *   < > 106 111* 115*   CO2 18*   < > 27 25 23   *   < > 158* 95 91   BUN 14   < > 15 14 12   CREATININE 1.6*   < > 1.0 1.1 1.1   CALCIUM 8.7   < > 8.4* 9.1 9.1   PROT 6.0  --   --   --   --    ALBUMIN 3.2*  --   --   --   --    BILITOT 1.3*  --   --   --   --    ALKPHOS 41*  --   --   --   --    AST 27  --   --   --   --    ALT 16  --   --   --   --    ANIONGAP 13   < > 9 8 6*   ESTGFRAFRICA 52*   < > >60 >60 >60   EGFRNONAA 45*   < > >60 >60 >60    < > = values in this interval not displayed.   , CBC   Recent Labs   Lab 05/22/22  1805 05/23/22  0545 05/24/22  0511   WBC 11.42 14.12* 12.60   HGB 14.2 12.5* 12.3*   HCT 40.3 35.2* 35.5*    151 116*   , Troponin   Recent Labs   Lab 05/23/22  0256 05/23/22  0806 05/24/22  0511   TROPONINI 0.167* 0.184* 0.117*   , and All pertinent lab results from the last 24 hours have been reviewed.    Significant Imaging: Echocardiogram: Transthoracic echo (TTE) complete (Cupid Only):   Results for orders placed or performed during the hospital encounter of 05/22/22   Echo   Result Value Ref Range    BSA 2.1 m2    IVRT [id number] msec    TDI LATERAL 0.08 m/s    Left Ventricular Outflow Tract Mean Gradient 2.15 mmHg    Left Ventricular Outflow Tract Mean Velocity 0.8515742800 cm/s    MV stenosis pressure 1/2 time 82.536652634625654 ms    TR Max Daniel 1.69  m/s    IVC diameter 2.14 cm    Ao root annulus 4.07 cm    Ao peak daniel 1.17 m/s    Posterior Wall 1.50 (A) 0.6 - 1.1 cm    LVOT diameter 2.45 cm    LVOT peak daniel 0.96 m/s    LVOT peak VTI 18.80 cm    E wave deceleration time [id number] msec    MV Peak A Daniel 0.99 m/s    MV Peak E Daniel 0.77 m/s    RA Major Axis 4.94 cm    PV mean gradient 0.72 mmHg    RVOT peak daniel 0.60 m/s    RVOT peak VTI 10.0 cm    IVS 1.41 (A) 0.6 - 1.1 cm    Ao VTI 20.1 cm    AV mean gradient 3 mmHg    LVIDd 5.27 3.5 - 6.0 cm    LVIDs 4.25 (A) 2.1 - 4.0 cm    Left Atrium Major Axis 5.08 cm    Left Atrium Minor Axis 4.97 cm    STJ 2.96 cm    Ascending aorta 3.13 cm    LV Systolic Volume 80.83 mL    LV Diastolic Volume 133.86 mL    TDI SEPTAL 0.10 m/s    LA size 2.62 cm    LV LATERAL E/E' RATIO 9.63 m/s    LV SEPTAL E/E' RATIO 7.70 m/s    FS 19 %    LV mass 334.22 g    Left Ventricle Relative Wall Thickness 0.57 cm    AV valve area 4.41 cm2    AV Velocity Ratio 0.82     AV index (prosthetic) 0.94     MV valve area p 1/2 method 2.68 cm2    E/A ratio 0.78     Mean e' 0.09 m/s    LVOT area 4.7 cm2    LVOT stroke volume 88.58 cm3    AV peak gradient 5 mmHg    E/E' ratio 8.56 m/s    LV Systolic Volume Index 39.4 mL/m2    LV Diastolic Volume Index 65.30 mL/m2    LV Mass Index 163 g/m2    Triscuspid Valve Regurgitation Peak Gradient 11 mmHg    LA WIDTH 2.50 cm    LA volume 27.97 cm3    Sinus 3.58 cm    TAPSE 1.44 cm    LA Volume Index 13.6 mL/m2    RA Width 4.21 cm    EF 15 %    Narrative    · The left ventricle is mildly enlarged with moderate concentric   hypertrophy and severely decreased systolic function.  · The estimated ejection fraction is 15%.  · Grade I left ventricular diastolic dysfunction.  · There are segmental left ventricular wall motion abnormalities.  · Normal right ventricular size with moderately reduced right ventricular   systolic function.  · Mild tricuspid regurgitation.  · Mechanically ventilated; cannot use inferior caval  vein diameter to   estimate central venous pressure.  · The aortic root is mildly dilated.      , EKG: Reviewed, and X-Ray: CXR: X-Ray Chest 1 View (CXR): No results found for this visit on 05/22/22. and X-Ray Chest PA and Lateral (CXR): No results found for this visit on 05/22/22.    Assessment and Plan:   Patient who presents s/p drug overdose. Noted to have new onset cardiomyopathy. OMT initiated. Will need ischemic evaluation pending mental stability. Continue tx as per psych/primary team.     * Intentional drug overdose  Cont tx per psych, will need further inpt tx    Acute combined systolic and diastolic congestive heart failure/ Cardiomyopathy - Ischemic vs Drug induced   Newly diagnosed CHF EF 15%  Will need ischemic workup once patient is stable mentally  Start OMT, will discuss Lifevest but likely not candidate if patient cannot process/follow up as needed  Will need CHF clinic nursing education for him and family     5/24/22  -Continue OMT as tolerated-Coreg, Entresto  -Ischemic workup pending mental stability   -Likely not good candidate for LifeVest    Aspiration pneumonia/ Pneumonitis   Cont tx per primary/ICU team    Acute hypoxemic respiratory failure  Extubated now , stable     Schizoaffective disorder, bipolar type  Cont tx per primary team/psych         VTE Risk Mitigation (From admission, onward)         Ordered     enoxaparin injection 40 mg  Daily         05/22/22 2317     IP VTE LOW RISK PATIENT  Once         05/22/22 2149     Place sequential compression device  Until discontinued         05/22/22 2149                Mary Su PA-C  Cardiology  [facility]

## 2022-05-24 NOTE — ASSESSMENT & PLAN NOTE
Patient is identified as having Combined Systolic and Diastolic heart failure that is Acute. CHF is currently controlled. Latest ECHO performed and demonstrates- Results for orders placed during the hospital encounter of 05/22/22    Echo    Interpretation Summary  · The left ventricle is mildly enlarged with moderate concentric hypertrophy and severely decreased systolic function.  · The estimated ejection fraction is 15%.  · Grade I left ventricular diastolic dysfunction.  · There are segmental left ventricular wall motion abnormalities.  · Normal right ventricular size with moderately reduced right ventricular systolic function.  · Mild tricuspid regurgitation.  · Mechanically ventilated; cannot use inferior caval vein diameter to estimate central venous pressure.  · The aortic root is mildly dilated.  . Continue Beta Blocker and ARNI and monitor clinical status closely. Monitor on telemetry. Patient is off CHF pathway.  Monitor strict Is&Os and daily weights.  Place on fluid restriction of 1.5 L. Continue to stress to patient importance of self efficacy and  on diet for CHF. Last BNP reviewed- and noted below   Recent Labs   Lab 05/22/22  1805   BNP 90   .

## 2022-05-24 NOTE — ASSESSMENT & PLAN NOTE
ED discussed with poison control, no recommendations for dantrolene as he was not having rigitity.   He is intubated for airway protection  .  Started on bicarbonate drip .  EKG showed qtc of 428  5/23-  Extubated today   Pt verbalized taking 8 pills of Risperdal intentionally   Sitter , suicide precaution   Tele -Psych consult    5/24-  Awaiting Tele-Psych consult

## 2022-05-24 NOTE — PROGRESS NOTES
[facility] Medicine  Progress Note    Patient Name: Tristin Saha  MRN: [id number]  Patient Class: IP- Inpatient   Admission Date: 5/22/2022  Length of Stay: 2 days  Attending Physician: Sadi Laurent MD  Primary Care Provider: Shari Colon MD        Subjective:     Principal Problem:Intentional drug overdose        HPI:  5/22/2022, 5:13 PM  History obtained from the [facility]      61 y/o M with PMH of bipolar, schizophrenia with prior suicide attempts here with suspected overdose. He asked the wife to stay at another house and when she got home, she found him unconscious with evidence of recent vomiting .EMS noted  noted rapid breathing, hot environment, and patient only responding to painful stimuli. He had an empty bottle of Risperdal next to him.  In the ED,  he was covered in vomit, gurgling respirations, and only localizing to pain, grunting, and not opening his eyes. He was breathing fast, in the 30's. Sats on bag valve mask were in the mid 90's. Temp was 103 ºF rectally.   Intubated for airway protection.   Lab and imaging test reviewed.  Component      Latest Ref Rng & Units 5/23/2022 5/22/2022   POC PH      7.35 - 7.45 7.494 (H) 7.278 (LL)   POC PCO2      35 - 45 mmHg 28.9 (LL) 43.9   POC PO2      80 - 100 mmHg 111 (H) 223 (H)     CT head with no acute findings. XR chest with possible aspiration.  ED work up -  He was acidotic, QRS was >100, we started bicarb at 250 cc/hr, and gave fluid bolus. Spoke with poison control, no recommendations for dantrolene as he was not having rigitity.   He will be admitted to the ICU.        Overview/Hospital Course:  Admitted to ICU overnight on mechanical ventilation for further management of intentional drug overdose. Empty bottle of Risperdal was found at home. QTc 428 on EKG. CK 1138>1123, troponin 0.050>0.184. Lactic acid normalized 1.9>4.4>3.9. Currently requiring minimal vent support.     5/23-Extubated today after successful SAT/SBT. Somnolent  , but awaken easily, oriented to self and person. Pt verbalized taking 8 pills of Risperdal intentionally. Will consult Tele-Psych today.  WBC 14K, Hgb 12.5, Plt 151. Troponin bumped to 0.184. Will get an echocardiogram. Continue ASA, empiric  antibiotic targeting aspiration pneumonia. Creatinine improved to normal 1.0>1.6. Metabolic acidosis resolved.     5/24- Awake . Appears confused , intermittently oriented to self and place , speech is tangential, slurred  and unintelligible. Will resume home medicine Buspar and low dose risperidone. Echo suggested LVEF 15%, G1DD, segmental ventricular WMA. Cardiology consulted . Pt started on ASA, statin, BB and ARNI and will need ischemic workup and possibly Life Vest. Troponin 0.117 this morning. Awaiting Tele-Psych consult . Downgrade to Med-Tele.           Interval History:   Currently disoriented and confused . Resume home Buspar and low dose Risperidone . Awaiting tele-psych  consult. Downgrade to Med-Tele.         Review of Systems   Unable to perform ROS: Mental status change   Objective:     Vital Signs (Most Recent):  Temp: 98.9 °F (37.2 °C) (05/24/22 1011)  Pulse: 78 (05/24/22 1011)  Resp: 18 (05/24/22 1011)  BP: 121/82 (05/24/22 1011)  SpO2: 95 % (05/24/22 1011) Vital Signs (24h Range):  Temp:  [98.6 °F (37 °C)-100.3 °F (37.9 °C)] 98.9 °F (37.2 °C)  Pulse:  [78-98] 78  Resp:  [17-57] 18  SpO2:  [91 %-100 %] 95 %  BP: ()/(55-94) 121/82     Weight: 88.2 kg (194 lb 7.1 oz)  Body mass index is 29.57 kg/m².    Intake/Output Summary (Last 24 hours) at 5/24/2022 1101  Last data filed at 5/24/2022 0900  Gross per 24 hour   Intake 527.67 ml   Output 1325 ml   Net -797.33 ml      Physical Exam  Constitutional:       General: He is not in acute distress.     Appearance: He is well-developed. He is not diaphoretic.      Comments: Intermittently oriented to self and place . Disoriented to person and situation    HENT:      Head: Normocephalic and atraumatic.       Mouth/Throat:      Pharynx: No oropharyngeal exudate.   Eyes:      Conjunctiva/sclera: Conjunctivae normal.      Pupils: Pupils are equal, round, and reactive to light.   Neck:      Thyroid: No thyromegaly.      Vascular: No JVD.   Cardiovascular:      Rate and Rhythm: Normal rate and regular rhythm.      Heart sounds: Normal heart sounds. No murmur heard.  Pulmonary:      Effort: Pulmonary effort is normal. No respiratory distress.      Breath sounds: Normal breath sounds. No wheezing or rales.   Chest:      Chest wall: No tenderness.   Abdominal:      General: Bowel sounds are normal. There is no distension.      Palpations: Abdomen is soft.      Tenderness: There is no abdominal tenderness. There is no guarding or rebound.   Musculoskeletal:         General: Normal range of motion.      Cervical back: Normal range of motion and neck supple.   Lymphadenopathy:      Cervical: No cervical adenopathy.   Skin:     General: Skin is warm and dry.      Findings: No rash.   Neurological:      General: No focal deficit present.      Mental Status: He is easily aroused. He is disoriented and confused.      Cranial Nerves: No cranial nerve deficit.      Sensory: No sensory deficit.      Deep Tendon Reflexes: Reflexes normal.   Psychiatric:         Attention and Perception: Attention normal.         Mood and Affect: Mood is anxious.         Speech: Speech is slurred and tangential.       Significant Labs: All pertinent labs within the past 24 hours have been reviewed.  BMP:   Recent Labs   Lab 05/24/22  0511   GLU 91      K 4.1   *   CO2 23   BUN 12   CREATININE 1.1   CALCIUM 9.1   MG 2.5     CBC:   Recent Labs   Lab 05/22/22  1805 05/23/22  0545 05/24/22  0511   WBC 11.42 14.12* 12.60   HGB 14.2 12.5* 12.3*   HCT 40.3 35.2* 35.5*    151 116*     CMP:   Recent Labs   Lab 05/22/22  1805 05/23/22  0256 05/23/22  0545 05/23/22  1532 05/24/22  0511   *   < > 142 144 144   K 3.3*   < > 3.0* 3.7 4.1   CL  115*   < > 106 111* 115*   CO2 18*   < > 27 25 23   *   < > 158* 95 91   BUN 14   < > 15 14 12   CREATININE 1.6*   < > 1.0 1.1 1.1   CALCIUM 8.7   < > 8.4* 9.1 9.1   PROT 6.0  --   --   --   --    ALBUMIN 3.2*  --   --   --   --    BILITOT 1.3*  --   --   --   --    ALKPHOS 41*  --   --   --   --    AST 27  --   --   --   --    ALT 16  --   --   --   --    ANIONGAP 13   < > 9 8 6*   EGFRNONAA 45*   < > >60 >60 >60    < > = values in this interval not displayed.       Significant Imaging:       Assessment/Plan:      * Intentional drug overdose  ED discussed with poison control, no recommendations for dantrolene as he was not having rigitity.   He is intubated for airway protection  .  Started on bicarbonate drip .  EKG showed qtc of 428  5/23-  Extubated today   Pt verbalized taking 8 pills of Risperdal intentionally   Sitter , suicide precaution   Tele -Psych consult    5/24-  Awaiting Tele-Psych consult     Acute hypoxemic respiratory failure  Patient with Hypoxic Respiratory failure which is Acute.  he is not on home oxygen. Supplemental oxygen was provided and noted- Oxygen Concentration (%):  [100] 100.   Signs/symptoms of respiratory failure include- respiratory distress. Contributing diagnoses includes - Aspiration Labs and images were reviewed. Patient Has recent ABG, which has been reviewed. Will treat underlying causes and adjust management of respiratory failure as follows-   Will continue Zosyn, ventilatory support , critical care follow up   5/23-  Extubated to NC today   Monitor clinical course     Schizoaffective disorder, bipolar type  Tele -Psych consult   Pt will need inpatient psychiatry evaluation   5/24-  Resume home meds - Buspar and low dose Risperidone    .    Aspiration pneumonia/ Pneumonitis     Follow tracheal cultures   On Zosyn empirically   5/24-   Low suspicion for active infectious process   Zosyn discontinued     On mechanically assisted ventilation, S/P Extubation  5/23/22        Encephalopathy, toxic  - Likely related to drug overdose   -Monitor clinical course       Acute combined systolic and diastolic congestive heart failure/ Cardiomyopathy - Ischemic vs Drug induced   Patient is identified as having Combined Systolic and Diastolic heart failure that is Acute. CHF is currently controlled. Latest ECHO performed and demonstrates- Results for orders placed during the hospital encounter of 05/22/22    Echo    Interpretation Summary  · The left ventricle is mildly enlarged with moderate concentric hypertrophy and severely decreased systolic function.  · The estimated ejection fraction is 15%.  · Grade I left ventricular diastolic dysfunction.  · There are segmental left ventricular wall motion abnormalities.  · Normal right ventricular size with moderately reduced right ventricular systolic function.  · Mild tricuspid regurgitation.  · Mechanically ventilated; cannot use inferior caval vein diameter to estimate central venous pressure.  · The aortic root is mildly dilated.  . Continue Beta Blocker and ARNI and monitor clinical status closely. Monitor on telemetry. Patient is off CHF pathway.  Monitor strict Is&Os and daily weights.  Place on fluid restriction of 1.5 L. Continue to stress to patient importance of self efficacy and  on diet for CHF. Last BNP reviewed- and noted below   Recent Labs   Lab 05/22/22  1805   BNP 90   .        VTE Risk Mitigation (From admission, onward)         Ordered     enoxaparin injection 40 mg  Daily         05/22/22 2315     IP VTE LOW RISK PATIENT  Once         05/22/22 2149     Place sequential compression device  Until discontinued         05/22/22 2149                Discharge Planning   DADA:      Code Status: Full Code   Is the patient medically ready for discharge?:     Reason for patient still in hospital (select all that apply): Patient trending condition  Discharge Plan A: [facility]  MD  Department of Hospital Medicine   '[place] - [facility]

## 2022-05-24 NOTE — ASSESSMENT & PLAN NOTE
Echo shows 15% EF and WMA  Cardiology following  Optimize medical therapy  May need ischemic eval +/- life vest therapy

## 2022-05-24 NOTE — SUBJECTIVE & OBJECTIVE
Interval History:   Currently disoriented and confused . Resume home Buspar and low dose Risperidone . Awaiting tele-psych  consult. Downgrade to Med-[employer].         Review of Systems   Unable to perform ROS: Mental status change   Objective:     Vital Signs (Most Recent):  Temp: 98.9 °F (37.2 °C) (05/24/22 1011)  Pulse: 78 (05/24/22 1011)  Resp: 18 (05/24/22 1011)  BP: 121/82 (05/24/22 1011)  SpO2: 95 % (05/24/22 1011) Vital Signs (24h Range):  Temp:  [98.6 °F (37 °C)-100.3 °F (37.9 °C)] 98.9 °F (37.2 °C)  Pulse:  [78-98] 78  Resp:  [17-57] 18  SpO2:  [91 %-100 %] 95 %  BP: ()/(55-94) 121/82     Weight: 88.2 kg (194 lb 7.1 oz)  Body mass index is 29.57 kg/m².    Intake/Output Summary (Last 24 hours) at 5/24/2022 1101  Last data filed at 5/24/2022 0900  Gross per 24 hour   Intake 527.67 ml   Output 1325 ml   Net -797.33 ml      Physical Exam  Constitutional:       General: He is not in acute distress.     Appearance: He is well-developed. He is not diaphoretic.      Comments: Intermittently oriented to self and place . Disoriented to person and situation    HENT:      Head: Normocephalic and atraumatic.      Mouth/Throat:      Pharynx: No oropharyngeal exudate.   Eyes:      Conjunctiva/sclera: Conjunctivae normal.      Pupils: Pupils are equal, round, and reactive to light.   Neck:      Thyroid: No thyromegaly.      Vascular: No JVD.   Cardiovascular:      Rate and Rhythm: Normal rate and regular rhythm.      Heart sounds: Normal heart sounds. No murmur heard.  Pulmonary:      Effort: Pulmonary effort is normal. No respiratory distress.      Breath sounds: Normal breath sounds. No wheezing or rales.   Chest:      Chest wall: No tenderness.   Abdominal:      General: Bowel sounds are normal. There is no distension.      Palpations: Abdomen is soft.      Tenderness: There is no abdominal tenderness. There is no guarding or rebound.   Musculoskeletal:         General: Normal range of motion.      Cervical back:  Normal range of motion and neck supple.   Lymphadenopathy:      Cervical: No cervical adenopathy.   Skin:     General: Skin is warm and dry.      Findings: No rash.   Neurological:      General: No focal deficit present.      Mental Status: He is easily aroused. He is disoriented and confused.      Cranial Nerves: No cranial nerve deficit.      Sensory: No sensory deficit.      Deep Tendon Reflexes: Reflexes normal.   Psychiatric:         Attention and Perception: Attention normal.         Mood and Affect: Mood is anxious.         Speech: Speech is slurred and tangential.       Significant Labs: All pertinent labs within the past 24 hours have been reviewed.  BMP:   Recent Labs   Lab 05/24/22  0511   GLU 91      K 4.1   *   CO2 23   BUN 12   CREATININE 1.1   CALCIUM 9.1   MG 2.5     CBC:   Recent Labs   Lab 05/22/22  1805 05/23/22  0545 05/24/22  0511   WBC 11.42 14.12* 12.60   HGB 14.2 12.5* 12.3*   HCT 40.3 35.2* 35.5*    151 116*     CMP:   Recent Labs   Lab 05/22/22  1805 05/23/22  0256 05/23/22  0545 05/23/22  1532 05/24/22  0511   *   < > 142 144 144   K 3.3*   < > 3.0* 3.7 4.1   *   < > 106 111* 115*   CO2 18*   < > 27 25 23   *   < > 158* 95 91   BUN 14   < > 15 14 12   CREATININE 1.6*   < > 1.0 1.1 1.1   CALCIUM 8.7   < > 8.4* 9.1 9.1   PROT 6.0  --   --   --   --    ALBUMIN 3.2*  --   --   --   --    BILITOT 1.3*  --   --   --   --    ALKPHOS 41*  --   --   --   --    AST 27  --   --   --   --    ALT 16  --   --   --   --    ANIONGAP 13   < > 9 8 6*   EGFRNONAA 45*   < > >60 >60 >60    < > = values in this interval not displayed.       Significant Imaging:

## 2022-05-24 NOTE — ASSESSMENT & PLAN NOTE
Newly diagnosed CHF EF 15%  Will need ischemic workup once patient is stable mentally  Start OMT, will discuss Lifevest but likely not candidate if patient cannot process/follow up as needed  Will need CHF clinic nursing education for him and family     5/24/22  -Continue OMT as tolerated-Coreg, Entresto  -Ischemic workup pending mental stability   -Likely not good candidate for LifeVest

## 2022-05-24 NOTE — ASSESSMENT & PLAN NOTE
Follow tracheal cultures   On Zosyn empirically   5/24-   Low suspicion for active infectious process   Zosyn discontinued

## 2022-05-24 NOTE — ASSESSMENT & PLAN NOTE
Patient with Hypoxic Respiratory failure which is Acute.  he is not on home oxygen. Supplemental oxygen was provided and noted- Oxygen Concentration (%):  [100] 100.   Signs/symptoms of respiratory failure include- respiratory distress. Contributing diagnoses includes - Aspiration Labs and images were reviewed. Patient Has recent ABG, which has been reviewed. Will treat underlying causes and adjust management of respiratory failure as follows-   Will continue Zosyn, ventilatory support , critical care follow up   5/23-  Extubated to NC today   Monitor clinical course

## 2022-05-24 NOTE — PLAN OF CARE
Patient remains free from falls and injuries this shift. Patient remains confused and is easily agitated,but redirects well. Patient has no signs of acute distress this shift. Has no complaints of pain. Will continue to monitor. Chart check completed.

## 2022-05-25 LAB
ANION GAP SERPL CALC-SCNC: 9 MMOL/L (ref 8–16)
BASOPHILS # BLD AUTO: 0.03 K/UL (ref 0–0.2)
BASOPHILS NFR BLD: 0.3 % (ref 0–1.9)
BNP SERPL-MCNC: 173 PG/ML (ref 0–99)
BUN SERPL-MCNC: 17 MG/DL (ref 8–23)
CALCIUM SERPL-MCNC: 9.2 MG/DL (ref 8.7–10.5)
CHLORIDE SERPL-SCNC: 113 MMOL/L (ref 95–110)
CK SERPL-CCNC: 368 U/L (ref 20–200)
CO2 SERPL-SCNC: 20 MMOL/L (ref 23–29)
CREAT SERPL-MCNC: 1 MG/DL (ref 0.5–1.4)
CV STRESS BASE HR: 93 BPM
DIASTOLIC BLOOD PRESSURE: 87 MMHG
DIFFERENTIAL METHOD: ABNORMAL
EJECTION FRACTION- HIGH: 73 %
END DIASTOLIC INDEX-HIGH: 165 ML/M2
END DIASTOLIC INDEX-LOW: 101 ML/M2
END SYSTOLIC INDEX-HIGH: 64 ML/M2
END SYSTOLIC INDEX-LOW: 28 ML/M2
EOSINOPHIL # BLD AUTO: 0.1 K/UL (ref 0–0.5)
EOSINOPHIL NFR BLD: 1.2 % (ref 0–8)
ERYTHROCYTE [DISTWIDTH] IN BLOOD BY AUTOMATED COUNT: 13.7 % (ref 11.5–14.5)
EST. GFR  (AFRICAN AMERICAN): >60 ML/MIN/1.73 M^2
EST. GFR  (NON AFRICAN AMERICAN): >60 ML/MIN/1.73 M^2
GLUCOSE SERPL-MCNC: 93 MG/DL (ref 70–110)
HCT VFR BLD AUTO: 35.5 % (ref 40–54)
HGB BLD-MCNC: 12.2 G/DL (ref 14–18)
IMM GRANULOCYTES # BLD AUTO: 0.08 K/UL (ref 0–0.04)
IMM GRANULOCYTES NFR BLD AUTO: 0.7 % (ref 0–0.5)
LYMPHOCYTES # BLD AUTO: 1.5 K/UL (ref 1–4.8)
LYMPHOCYTES NFR BLD: 13.5 % (ref 18–48)
MAGNESIUM SERPL-MCNC: 2 MG/DL (ref 1.6–2.6)
MCH RBC QN AUTO: 31.4 PG (ref 27–31)
MCHC RBC AUTO-ENTMCNC: 34.4 G/DL (ref 32–36)
MCV RBC AUTO: 91 FL (ref 82–98)
MONOCYTES # BLD AUTO: 0.7 K/UL (ref 0.3–1)
MONOCYTES NFR BLD: 5.8 % (ref 4–15)
NEUTROPHILS # BLD AUTO: 8.8 K/UL (ref 1.8–7.7)
NEUTROPHILS NFR BLD: 78.5 % (ref 38–73)
NRBC BLD-RTO: 0 /100 WBC
NUC REST EJECTION FRACTION: 30
NUC STRESS EJECTION FRACTION: 27 %
OHS CV CPX 85 PERCENT MAX PREDICTED HEART RATE MALE: 133
OHS CV CPX MAX PREDICTED HEART RATE: 156
OHS CV CPX PATIENT IS FEMALE: 0
OHS CV CPX PATIENT IS MALE: 1
OHS CV CPX PEAK DIASTOLIC BLOOD PRESSURE: 87 MMHG
OHS CV CPX PEAK HEAR RATE: 93 BPM
OHS CV CPX PEAK RATE PRESSURE PRODUCT: NORMAL
OHS CV CPX PEAK SYSTOLIC BLOOD PRESSURE: 132 MMHG
OHS CV CPX PERCENT MAX PREDICTED HEART RATE ACHIEVED: 60
OHS CV CPX RATE PRESSURE PRODUCT PRESENTING: NORMAL
PLATELET # BLD AUTO: 132 K/UL (ref 150–450)
PMV BLD AUTO: 9.9 FL (ref 9.2–12.9)
POTASSIUM SERPL-SCNC: 4 MMOL/L (ref 3.5–5.1)
PROCALCITONIN SERPL IA-MCNC: 7.52 NG/ML
RBC # BLD AUTO: 3.89 M/UL (ref 4.6–6.2)
RETIRED EF AND QEF - SEE NOTES: 59 %
SODIUM SERPL-SCNC: 142 MMOL/L (ref 136–145)
SYSTOLIC BLOOD PRESSURE: 132 MMHG
TROPONIN I SERPL DL<=0.01 NG/ML-MCNC: 0.07 NG/ML (ref 0–0.03)
WBC # BLD AUTO: 11.16 K/UL (ref 3.9–12.7)

## 2022-05-25 PROCEDURE — 63600175 PHARM REV CODE 636 W HCPCS: Performed by: INTERNAL MEDICINE

## 2022-05-25 PROCEDURE — 83735 ASSAY OF MAGNESIUM: CPT | Performed by: NURSE PRACTITIONER

## 2022-05-25 PROCEDURE — 93010 EKG 12-LEAD: ICD-10-PCS | Mod: ,,, | Performed by: INTERNAL MEDICINE

## 2022-05-25 PROCEDURE — 99233 PR SUBSEQUENT HOSPITAL CARE,LEVL III: ICD-10-PCS | Mod: ,,, | Performed by: PHYSICIAN ASSISTANT

## 2022-05-25 PROCEDURE — 80048 BASIC METABOLIC PNL TOTAL CA: CPT | Performed by: NURSE PRACTITIONER

## 2022-05-25 PROCEDURE — A4216 STERILE WATER/SALINE, 10 ML: HCPCS | Performed by: INTERNAL MEDICINE

## 2022-05-25 PROCEDURE — 83880 ASSAY OF NATRIURETIC PEPTIDE: CPT | Performed by: INTERNAL MEDICINE

## 2022-05-25 PROCEDURE — 25000003 PHARM REV CODE 250: Performed by: INTERNAL MEDICINE

## 2022-05-25 PROCEDURE — 25000003 PHARM REV CODE 250: Performed by: NURSE PRACTITIONER

## 2022-05-25 PROCEDURE — 94761 N-INVAS EAR/PLS OXIMETRY MLT: CPT

## 2022-05-25 PROCEDURE — 85025 COMPLETE CBC W/AUTO DIFF WBC: CPT | Performed by: NURSE PRACTITIONER

## 2022-05-25 PROCEDURE — 99233 SBSQ HOSP IP/OBS HIGH 50: CPT | Mod: ,,, | Performed by: PHYSICIAN ASSISTANT

## 2022-05-25 PROCEDURE — 84145 PROCALCITONIN (PCT): CPT | Performed by: INTERNAL MEDICINE

## 2022-05-25 PROCEDURE — 21400001 HC TELEMETRY ROOM

## 2022-05-25 PROCEDURE — 93005 ELECTROCARDIOGRAM TRACING: CPT

## 2022-05-25 PROCEDURE — 93010 ELECTROCARDIOGRAM REPORT: CPT | Mod: ,,, | Performed by: INTERNAL MEDICINE

## 2022-05-25 PROCEDURE — 82550 ASSAY OF CK (CPK): CPT | Performed by: INTERNAL MEDICINE

## 2022-05-25 PROCEDURE — 84484 ASSAY OF TROPONIN QUANT: CPT | Performed by: INTERNAL MEDICINE

## 2022-05-25 RX ORDER — FUROSEMIDE 10 MG/ML
40 INJECTION INTRAMUSCULAR; INTRAVENOUS ONCE
Status: COMPLETED | OUTPATIENT
Start: 2022-05-25 | End: 2022-05-25

## 2022-05-25 RX ORDER — AMOXICILLIN AND CLAVULANATE POTASSIUM 875; 125 MG/1; MG/1
1 TABLET, FILM COATED ORAL EVERY 12 HOURS
Status: DISCONTINUED | OUTPATIENT
Start: 2022-05-25 | End: 2022-05-29 | Stop reason: HOSPADM

## 2022-05-25 RX ORDER — FUROSEMIDE 20 MG/1
20 TABLET ORAL DAILY
Status: DISCONTINUED | OUTPATIENT
Start: 2022-05-25 | End: 2022-05-29 | Stop reason: HOSPADM

## 2022-05-25 RX ORDER — REGADENOSON 0.08 MG/ML
0.4 INJECTION, SOLUTION INTRAVENOUS ONCE
Status: COMPLETED | OUTPATIENT
Start: 2022-05-25 | End: 2022-05-25

## 2022-05-25 RX ORDER — DOXYCYCLINE HYCLATE 100 MG
100 TABLET ORAL EVERY 12 HOURS
Status: DISCONTINUED | OUTPATIENT
Start: 2022-05-25 | End: 2022-05-29 | Stop reason: HOSPADM

## 2022-05-25 RX ADMIN — ENOXAPARIN SODIUM 40 MG: 40 INJECTION SUBCUTANEOUS at 05:05

## 2022-05-25 RX ADMIN — MUPIROCIN: 20 OINTMENT TOPICAL at 10:05

## 2022-05-25 RX ADMIN — Medication 10 ML: at 09:05

## 2022-05-25 RX ADMIN — ASPIRIN 81 MG CHEWABLE TABLET 81 MG: 81 TABLET CHEWABLE at 10:05

## 2022-05-25 RX ADMIN — Medication 10 ML: at 06:05

## 2022-05-25 RX ADMIN — DOXYCYCLINE HYCLATE 100 MG: 100 TABLET, COATED ORAL at 09:05

## 2022-05-25 RX ADMIN — MUPIROCIN: 20 OINTMENT TOPICAL at 09:05

## 2022-05-25 RX ADMIN — CHLORHEXIDINE GLUCONATE 0.12% ORAL RINSE 15 ML: 1.2 LIQUID ORAL at 09:05

## 2022-05-25 RX ADMIN — FUROSEMIDE 20 MG: 20 TABLET ORAL at 09:05

## 2022-05-25 RX ADMIN — RISPERIDONE 0.5 MG: 0.5 TABLET ORAL at 09:05

## 2022-05-25 RX ADMIN — FUROSEMIDE 40 MG: 10 INJECTION, SOLUTION INTRAMUSCULAR; INTRAVENOUS at 10:05

## 2022-05-25 RX ADMIN — AMOXICILLIN AND CLAVULANATE POTASSIUM 1 TABLET: 875; 125 TABLET, FILM COATED ORAL at 10:05

## 2022-05-25 RX ADMIN — AMOXICILLIN AND CLAVULANATE POTASSIUM 1 TABLET: 875; 125 TABLET, FILM COATED ORAL at 09:05

## 2022-05-25 RX ADMIN — FAMOTIDINE 20 MG: 20 TABLET ORAL at 10:05

## 2022-05-25 RX ADMIN — Medication 6 MG: at 09:05

## 2022-05-25 RX ADMIN — CARVEDILOL 3.12 MG: 3.12 TABLET, FILM COATED ORAL at 09:05

## 2022-05-25 RX ADMIN — POLYETHYLENE GLYCOL 3350 17 G: 17 POWDER, FOR SOLUTION ORAL at 09:05

## 2022-05-25 RX ADMIN — CARVEDILOL 3.12 MG: 3.12 TABLET, FILM COATED ORAL at 10:05

## 2022-05-25 RX ADMIN — RISPERIDONE 0.5 MG: 0.5 TABLET ORAL at 10:05

## 2022-05-25 RX ADMIN — SACUBITRIL AND VALSARTAN 1 TABLET: 24; 26 TABLET, FILM COATED ORAL at 09:05

## 2022-05-25 RX ADMIN — ATORVASTATIN CALCIUM 40 MG: 40 TABLET, FILM COATED ORAL at 09:05

## 2022-05-25 RX ADMIN — DOXYCYCLINE HYCLATE 100 MG: 100 TABLET, COATED ORAL at 10:05

## 2022-05-25 RX ADMIN — FAMOTIDINE 20 MG: 20 TABLET ORAL at 09:05

## 2022-05-25 RX ADMIN — REGADENOSON 0.4 MG: 0.08 INJECTION, SOLUTION INTRAVENOUS at 04:05

## 2022-05-25 RX ADMIN — CHLORHEXIDINE GLUCONATE 0.12% ORAL RINSE 15 ML: 1.2 LIQUID ORAL at 10:05

## 2022-05-25 NOTE — PLAN OF CARE
Patient remains free from falls and injuries this shift. Safety precautions maintained. No pain reported by patient. No s/s of acute distress noted. Will continue to monitor patient. Chart check completed.

## 2022-05-25 NOTE — ASSESSMENT & PLAN NOTE
Patient is identified as having Combined Systolic and Diastolic heart failure that is Acute. CHF is currently controlled. Latest ECHO performed and demonstrates- Results for orders placed during the hospital encounter of 05/22/22    Echo    Interpretation Summary  · The left ventricle is mildly enlarged with moderate concentric hypertrophy and severely decreased systolic function.  · The estimated ejection fraction is 15%.  · Grade I left ventricular diastolic dysfunction.  · There are segmental left ventricular wall motion abnormalities.  · Normal right ventricular size with moderately reduced right ventricular systolic function.  · Mild tricuspid regurgitation.  · Mechanically ventilated; cannot use inferior caval vein diameter to estimate central venous pressure.  · The aortic root is mildly dilated.  . Continue Beta Blocker and ARNI and monitor clinical status closely. Monitor on telemetry. Patient is off CHF pathway.  Monitor strict Is&Os and daily weights.  Place on fluid restriction of 1.5 L. Continue to stress to patient importance of self efficacy and  on diet for CHF. Last BNP reviewed- and noted below   Recent Labs   Lab 05/25/22  0605   *   .  5/25-   Troponin trended down to 0.068.   Denies chest pain or SOB.   Cards suggested to continue OMT for now and ischemic workup once patient is stable mentally.   Pt deemed not a good candidate for Life vest.  Continue ASA, Statin, BB, ARNI

## 2022-05-25 NOTE — ASSESSMENT & PLAN NOTE
Tele -Psych consult   Pt will need inpatient psychiatry evaluation   5/24-  Resume home meds - Buspar and low dose Risperidone   5/25-  Tele-Psych consult obtained   Buspar discontinued per recs  Continue Risperidone and PRN Zyprexa

## 2022-05-25 NOTE — ASSESSMENT & PLAN NOTE
Patient with Hypoxic Respiratory failure which is Acute.  he is not on home oxygen. Supplemental oxygen was provided and noted-  .   Signs/symptoms of respiratory failure include- respiratory distress. Contributing diagnoses includes - Aspiration Labs and images were reviewed. Patient Has recent ABG, which has been reviewed. Will treat underlying causes and adjust management of respiratory failure as follows-   Will continue Zosyn, ventilatory support , critical care follow up   5/23-  Extubated to NC today   Wean FiO2 as tolerated   Monitor clinical course   5/24-  Satisfactory SpO2 on RA

## 2022-05-25 NOTE — PROGRESS NOTES
[facility] Medicine  Progress Note    Patient Name: Tristin Saha  MRN: [id number]  Patient Class: IP- Inpatient   Admission Date: 5/22/2022  Length of Stay: 3 days  Attending Physician: Sadi Laurent MD  Primary Care Provider: Shari Colon MD        Subjective:     Principal Problem:Intentional drug overdose        HPI:  5/22/2022, 5:13 PM  History obtained from the [facility]      63 y/o M with PMH of bipolar, schizophrenia with prior suicide attempts here with suspected overdose. He asked the wife to stay at another house and when she got home, she found him unconscious with evidence of recent vomiting .EMS noted  noted rapid breathing, hot environment, and patient only responding to painful stimuli. He had an empty bottle of Risperdal next to him.  In the ED,  he was covered in vomit, gurgling respirations, and only localizing to pain, grunting, and not opening his eyes. He was breathing fast, in the 30's. Sats on bag valve mask were in the mid 90's. Temp was 103 ºF rectally.   Intubated for airway protection.   Lab and imaging test reviewed.  Component      Latest Ref Rng & Units 5/23/2022 5/22/2022   POC PH      7.35 - 7.45 7.494 (H) 7.278 (LL)   POC PCO2      35 - 45 mmHg 28.9 (LL) 43.9   POC PO2      80 - 100 mmHg 111 (H) 223 (H)     CT head with no acute findings. XR chest with possible aspiration.  ED work up -  He was acidotic, QRS was >100, we started bicarb at 250 cc/hr, and gave fluid bolus. Spoke with poison control, no recommendations for dantrolene as he was not having rigitity.   He will be admitted to the ICU.        Overview/Hospital Course:  Admitted to ICU overnight on mechanical ventilation for further management of intentional drug overdose. Empty bottle of Risperdal was found at home. QTc 428 on EKG. CK 1138>1123, troponin 0.050>0.184. Lactic acid normalized 1.9>4.4>3.9. Currently requiring minimal vent support.     5/23-Extubated today after successful SAT/SBT. Somnolent  , but awaken easily, oriented to self and person. Pt verbalized taking 8 pills of Risperdal intentionally. Will consult Tele-Psych today.  WBC 14K, Hgb 12.5, Plt 151. Troponin bumped to 0.184. Will get an echocardiogram. Continue ASA, empiric  antibiotic targeting aspiration pneumonia. Creatinine improved to normal 1.0>1.6. Metabolic acidosis resolved.     5/24- Awake . Appears confused , intermittently oriented to self and place , speech is tangential, slurred  and unintelligible. Will resume home medicine Buspar and low dose risperidone. Echo suggested LVEF 15%, G1DD, segmental ventricular WMA. Cardiology consulted . Pt started on ASA, statin, BB and ARNI and will need ischemic workup and possibly Life Vest. Troponin 0.117 this morning. Awaiting Tele-Psych consult . Downgrade to [facility].     5/25- Pt appears calmer , speech is much clearer today. Oriented x 3. New onset fever overnight . Tmax 101.3. Repeat CXR showed consolidation in the left mid lower lung with small effusion consistent with pneumonia. Antibiotic include Augmentin and Doxycycline inititaed . SpO2 93% on RA. WBC 11.1K, Plt 132, Cr 1.0. PCT 7.52.  Troponin trended down to 0.068. Denies chest pain or SOB. Cards suggested to continue OMT for now and ischemic workup once patient is stable mentally. Pt deemed not a good candidate for Life vest. Tele Psych consult obtained. Buspar discontinued as recommended by Tele Psych. Risperidone continues with PRN Zyprexa. Disposition - In-patient Psych placement once pt is fever free x 48h. Currently stable from Cardiac stand point. Pt is CEDed.       Interval History:   New onset fever. Antibiotic initiated. Tele Psych consult obtained. Buspar discontinued as recommended by Tele Psych. Risperidone continues with PRN Zyprexa. Disposition - In-patient Psych placement once pt is fever free x 48h. Currently stable from Cardiac stand point. Pt is CEDed.       Review of Systems   Constitutional:  Positive for  activity change, appetite change and fatigue. Negative for fever.   HENT:  Negative for sore throat.    Eyes:  Negative for visual disturbance.   Respiratory:  Negative for cough, chest tightness and shortness of breath.    Cardiovascular:  Negative for chest pain, palpitations and leg swelling.   Gastrointestinal:  Negative for abdominal distention, abdominal pain, constipation, diarrhea, nausea and vomiting.   Endocrine: Negative for polyuria.   Genitourinary:  Negative for decreased urine volume, dysuria, flank pain, frequency and hematuria.   Musculoskeletal:  Negative for back pain and gait problem.   Skin:  Negative for rash.   Neurological:  Negative for syncope, speech difficulty, weakness, light-headedness and headaches.   Psychiatric/Behavioral:  Positive for confusion (intermittent). Negative for hallucinations and sleep disturbance.    Objective:     Vital Signs (Most Recent):  Temp: 98 °F (36.7 °C) (05/25/22 0900)  Pulse: 90 (05/25/22 0900)  Resp: 20 (05/25/22 0900)  BP: 121/71 (05/25/22 0900)  SpO2: (!) 91 % (05/25/22 0900)   Vital Signs (24h Range):  Temp:  [98 °F (36.7 °C)-101.3 °F (38.5 °C)] 98 °F (36.7 °C)  Pulse:  [74-98] 90  Resp:  [17-20] 20  SpO2:  [90 %-94 %] 91 %  BP: (109-150)/(55-78) 121/71     Weight: 85 kg (187 lb 6.3 oz)  Body mass index is 28.49 kg/m².    Intake/Output Summary (Last 24 hours) at 5/25/2022 1120  Last data filed at 5/24/2022 2104  Gross per 24 hour   Intake 240 ml   Output --   Net 240 ml      Physical Exam  Constitutional:       General: He is not in acute distress.     Appearance: He is well-developed. He is not diaphoretic.      Comments: Pt is oriented x 3 today. Speech is clearer    HENT:      Head: Normocephalic and atraumatic.      Mouth/Throat:      Pharynx: No oropharyngeal exudate.   Eyes:      Conjunctiva/sclera: Conjunctivae normal.      Pupils: Pupils are equal, round, and reactive to light.   Neck:      Thyroid: No thyromegaly.      Vascular: No JVD.    Cardiovascular:      Rate and Rhythm: Normal rate and regular rhythm.      Heart sounds: Normal heart sounds. No murmur heard.  Pulmonary:      Effort: Pulmonary effort is normal. No respiratory distress.      Breath sounds: Normal breath sounds. No wheezing or rales.   Chest:      Chest wall: No tenderness.   Abdominal:      General: Bowel sounds are normal. There is no distension.      Palpations: Abdomen is soft.      Tenderness: There is no abdominal tenderness. There is no guarding or rebound.   Musculoskeletal:         General: Normal range of motion.      Cervical back: Normal range of motion and neck supple.   Lymphadenopathy:      Cervical: No cervical adenopathy.   Skin:     General: Skin is warm and dry.      Findings: No rash.   Neurological:      General: No focal deficit present.      Mental Status: He is oriented to person, place, and time and easily aroused.      Cranial Nerves: No cranial nerve deficit.      Sensory: No sensory deficit.      Deep Tendon Reflexes: Reflexes normal.      Comments: Intermittent confusion   Psychiatric:         Attention and Perception: Attention normal.         Mood and Affect: Mood is anxious.         Speech: Speech is slurred and tangential.       Significant Labs: All pertinent labs within the past 24 hours have been reviewed.  BMP:   Recent Labs   Lab 05/25/22  0605   GLU 93      K 4.0   *   CO2 20*   BUN 17   CREATININE 1.0   CALCIUM 9.2   MG 2.0     CBC:   Recent Labs   Lab 05/24/22  0511 05/25/22  0605   WBC 12.60 11.16   HGB 12.3* 12.2*   HCT 35.5* 35.5*   * 132*     CMP:   Recent Labs   Lab 05/23/22  1532 05/24/22  0511 05/25/22  0605    144 142   K 3.7 4.1 4.0   * 115* 113*   CO2 25 23 20*   GLU 95 91 93   BUN 14 12 17   CREATININE 1.1 1.1 1.0   CALCIUM 9.1 9.1 9.2   ANIONGAP 8 6* 9   EGFRNONAA >60 >60 >60     Cardiac Markers:   Recent Labs   Lab 05/25/22  0605   *       Significant Imaging:       Assessment/Plan:      *  Intentional drug overdose  ED discussed with poison control, no recommendations for dantrolene as he was not having rigitity.   He is intubated for airway protection  .  Started on bicarbonate drip .  EKG showed QTc 428  5/23-  Extubated today   Pt verbalized taking 8 pills of Risperdal intentionally   Sitter , suicide precaution   Tele -Psych consult    5/25-   Tele Psych consult obtained   PEC/CEC continues   Transfer to In-patient psych facility once medically cleared     Acute hypoxemic respiratory failure  Patient with Hypoxic Respiratory failure which is Acute.  he is not on home oxygen. Supplemental oxygen was provided and noted-  .   Signs/symptoms of respiratory failure include- respiratory distress. Contributing diagnoses includes - Aspiration Labs and images were reviewed. Patient Has recent ABG, which has been reviewed. Will treat underlying causes and adjust management of respiratory failure as follows-   Will continue Zosyn, ventilatory support , critical care follow up   5/23-  Extubated to NC today   Wean FiO2 as tolerated   Monitor clinical course   5/24-  Satisfactory SpO2 on RA    Schizoaffective disorder, bipolar type  Tele -Psych consult   Pt will need inpatient psychiatry evaluation   5/24-  Resume home meds - Buspar and low dose Risperidone   5/25-  Tele-Psych consult obtained   Buspar discontinued per recs  Continue Risperidone and PRN Zyprexa     Aspiration pneumonia/ Pneumonitis     Follow tracheal cultures   On Zosyn empirically   5/24-   Low suspicion for active infectious process   Zosyn discontinued per CC team  5/25-  New onset fever over night   Antibiotic include Augmentin and Doxycycline initiated     On mechanically assisted ventilation, S/P Extubation 5/23/22        Encephalopathy, toxic/ [place]   - Likely related to drug overdose   -Monitor clinical course   5/25-  -Improving       Acute combined systolic and diastolic congestive heart failure/ Cardiomyopathy - Ischemic vs  Drug induced   Patient is identified as having Combined Systolic and Diastolic heart failure that is Acute. CHF is currently controlled. Latest ECHO performed and demonstrates- Results for orders placed during the hospital encounter of 05/22/22    Echo    Interpretation Summary  · The left ventricle is mildly enlarged with moderate concentric hypertrophy and severely decreased systolic function.  · The estimated ejection fraction is 15%.  · Grade I left ventricular diastolic dysfunction.  · There are segmental left ventricular wall motion abnormalities.  · Normal right ventricular size with moderately reduced right ventricular systolic function.  · Mild tricuspid regurgitation.  · Mechanically ventilated; cannot use inferior caval vein diameter to estimate central venous pressure.  · The aortic root is mildly dilated.  . Continue Beta Blocker and ARNI and monitor clinical status closely. Monitor on telemetry. Patient is off CHF pathway.  Monitor strict Is&Os and daily weights.  Place on fluid restriction of 1.5 L. Continue to stress to patient importance of self efficacy and  on diet for CHF. Last BNP reviewed- and noted below   Recent Labs   Lab 05/25/22  0605   *   .  5/25-   Troponin trended down to 0.068.   Denies chest pain or SOB.   Cards suggested to continue OMT for now and ischemic workup once patient is stable mentally.   Pt deemed not a good candidate for Life vest.  Continue ASA, Statin, BB, ARNI      VTE Risk Mitigation (From admission, onward)         Ordered     enoxaparin injection 40 mg  Daily         05/22/22 2315     IP VTE LOW RISK PATIENT  Once         05/22/22 2149     Place sequential compression device  Until discontinued         05/22/22 2149                Discharge Planning   DADA:      Code Status: Full Code   Is the patient medically ready for discharge?:     Reason for patient still in hospital (select all that apply): Patient trending condition and Pending  disposition  Discharge Plan A: [facility]                  Sadi Laurent MD  Department of Hospital Medicine   '[facility]

## 2022-05-25 NOTE — CONSULTS
Cm notified charge nurse of consult. Charge nurse will place orders once patient is medically stable for dc.

## 2022-05-25 NOTE — SUBJECTIVE & OBJECTIVE
Review of Systems   Constitutional: Positive for fever and malaise/fatigue.   HENT: Negative.     Eyes: Negative.    Cardiovascular:  Positive for dyspnea on exertion.   Respiratory: Negative.     Endocrine: Negative.    Skin: Negative.    Musculoskeletal:  Positive for arthritis.   Gastrointestinal: Negative.    Genitourinary: Negative.    Neurological: Negative.    Psychiatric/Behavioral: Negative.     Allergic/Immunologic: Negative.    Objective:     Vital Signs (Most Recent):  Temp: 99.1 °F (37.3 °C) (05/25/22 1158)  Pulse: 82 (05/25/22 1158)  Resp: 18 (05/25/22 1158)  BP: (!) 94/56 (05/25/22 1158)  SpO2: (!) 90 % (05/25/22 1158)   Vital Signs (24h Range):  Temp:  [98 °F (36.7 °C)-101.3 °F (38.5 °C)] 99.1 °F (37.3 °C)  Pulse:  [74-98] 82  Resp:  [17-20] 18  SpO2:  [90 %-94 %] 90 %  BP: ()/(55-78) 94/56     Weight: 85 kg (187 lb 6.3 oz)  Body mass index is 28.49 kg/m².     SpO2: (!) 90 %  O2 Device (Oxygen Therapy): room air      Intake/Output Summary (Last 24 hours) at 5/25/2022 1346  Last data filed at 5/25/2022 0925  Gross per 24 hour   Intake 360 ml   Output --   Net 360 ml       Lines/Drains/Airways       Peripherally Inserted Central Catheter Line  Duration             PICC Double Lumen 05/23/22 0425 left basilic 2 days              Peripheral Intravenous Line  Duration                  Peripheral IV - Single Lumen 05/23/22 0030 20 G Left;Posterior Hand 2 days                    Physical Exam  Vitals and nursing note reviewed.   Constitutional:       General: He is not in acute distress.     Appearance: Normal appearance. He is well-developed. He is not diaphoretic.   HENT:      Head: Normocephalic and atraumatic.   Eyes:      General:         Right eye: No discharge.         Left eye: No discharge.      Pupils: Pupils are equal, round, and reactive to light.   Neck:      Thyroid: No thyromegaly.      Vascular: No JVD.      Trachea: No tracheal deviation.   Cardiovascular:      Rate and Rhythm:  Normal rate and regular rhythm.      Heart sounds: Normal heart sounds, S1 normal and S2 normal. No murmur heard.  Pulmonary:      Effort: Pulmonary effort is normal. No respiratory distress.      Breath sounds: No wheezing.      Comments: Coarse BS at bases  Abdominal:      General: There is no distension.      Tenderness: There is no rebound.   Musculoskeletal:      Cervical back: Neck supple.      Right lower leg: No edema.      Left lower leg: No edema.   Skin:     General: Skin is warm and dry.      Findings: No erythema.   Neurological:      Mental Status: He is alert and oriented to person, place, and time.      Comments: Flat affect   Psychiatric:         Mood and Affect: Mood normal.         Behavior: Behavior normal.       Significant Labs: CMP   Recent Labs   Lab 05/23/22  1532 05/24/22  0511 05/25/22  0605    144 142   K 3.7 4.1 4.0   * 115* 113*   CO2 25 23 20*   GLU 95 91 93   BUN 14 12 17   CREATININE 1.1 1.1 1.0   CALCIUM 9.1 9.1 9.2   ANIONGAP 8 6* 9   ESTGFRAFRICA >60 >60 >60   EGFRNONAA >60 >60 >60   , CBC   Recent Labs   Lab 05/24/22  0511 05/25/22  0605   WBC 12.60 11.16   HGB 12.3* 12.2*   HCT 35.5* 35.5*   * 132*   , Troponin   Recent Labs   Lab 05/24/22  0511 05/25/22  0605   TROPONINI 0.117* 0.068*   , and All pertinent lab results from the last 24 hours have been reviewed.    Significant Imaging: Echocardiogram: Transthoracic echo (TTE) complete (Cupid Only):   Results for orders placed or performed during the hospital encounter of 05/22/22   Echo   Result Value Ref Range    BSA 2.1 m2    IVRT [id number] msec    TDI LATERAL 0.08 m/s    Left Ventricular Outflow Tract Mean Gradient 2.15 mmHg    Left Ventricular Outflow Tract Mean Velocity 0.3680370198 cm/s    MV stenosis pressure 1/2 time 82.215778457708894 ms    TR Max Daniel 1.69 m/s    IVC diameter 2.14 cm    Ao root annulus 4.07 cm    Ao peak daniel 1.17 m/s    Posterior Wall 1.50 (A) 0.6 - 1.1 cm    LVOT diameter  2.45 cm    LVOT peak daniel 0.96 m/s    LVOT peak VTI 18.80 cm    E wave deceleration time [id number] msec    MV Peak A Daniel 0.99 m/s    MV Peak E Daniel 0.77 m/s    RA Major Axis 4.94 cm    PV mean gradient 0.72 mmHg    RVOT peak daniel 0.60 m/s    RVOT peak VTI 10.0 cm    IVS 1.41 (A) 0.6 - 1.1 cm    Ao VTI 20.1 cm    AV mean gradient 3 mmHg    LVIDd 5.27 3.5 - 6.0 cm    LVIDs 4.25 (A) 2.1 - 4.0 cm    Left Atrium Major Axis 5.08 cm    Left Atrium Minor Axis 4.97 cm    STJ 2.96 cm    Ascending aorta 3.13 cm    LV Systolic Volume 80.83 mL    LV Diastolic Volume 133.86 mL    TDI SEPTAL 0.10 m/s    LA size 2.62 cm    LV LATERAL E/E' RATIO 9.63 m/s    LV SEPTAL E/E' RATIO 7.70 m/s    FS 19 %    LV mass 334.22 g    Left Ventricle Relative Wall Thickness 0.57 cm    AV valve area 4.41 cm2    AV Velocity Ratio 0.82     AV index (prosthetic) 0.94     MV valve area p 1/2 method 2.68 cm2    E/A ratio 0.78     Mean e' 0.09 m/s    LVOT area 4.7 cm2    LVOT stroke volume 88.58 cm3    AV peak gradient 5 mmHg    E/E' ratio 8.56 m/s    LV Systolic Volume Index 39.4 mL/m2    LV Diastolic Volume Index 65.30 mL/m2    LV Mass Index 163 g/m2    Triscuspid Valve Regurgitation Peak Gradient 11 mmHg    LA WIDTH 2.50 cm    LA volume 27.97 cm3    Sinus 3.58 cm    TAPSE 1.44 cm    LA Volume Index 13.6 mL/m2    RA Width 4.21 cm    EF 15 %    Narrative    · The left ventricle is mildly enlarged with moderate concentric   hypertrophy and severely decreased systolic function.  · The estimated ejection fraction is 15%.  · Grade I left ventricular diastolic dysfunction.  · There are segmental left ventricular wall motion abnormalities.  · Normal right ventricular size with moderately reduced right ventricular   systolic function.  · Mild tricuspid regurgitation.  · Mechanically ventilated; cannot use inferior caval vein diameter to   estimate central venous pressure.  · The aortic root is mildly dilated.      , EKG: Reviewed, and X-Ray: CXR: X-Ray  Chest 1 View (CXR): No results found for this visit on 05/22/22. and X-Ray Chest PA and Lateral (CXR): No results found for this visit on 05/22/22.

## 2022-05-25 NOTE — ASSESSMENT & PLAN NOTE
Newly diagnosed CHF EF 15%  Will need ischemic workup once patient is stable mentally  Start OMT, will discuss Lifevest but likely not candidate if patient cannot process/follow up as needed  Will need CHF clinic nursing education for him and family     5/24/22  -Continue OMT as tolerated-Coreg, Entresto  -Ischemic workup pending mental stability   -Likely not good candidate for LifeVest    5/25/22  -Stable  -Continue BB, Entresto  -MPI stress test today  -LifeVest discussed, patient appears disinterested at present time, unclear if he fully grasps purpose of device, will readdress in AM

## 2022-05-25 NOTE — PROGRESS NOTES
[facility]  Cardiology  Progress Note    Patient Name: Tristin Saha  MRN: [id number]  Admission Date: 5/22/2022  Hospital Length of Stay: 3 days  Code Status: Full Code   Attending Physician: Sadi Laurent MD   Primary Care Physician: Shari Colon MD  Expected Discharge Date:   Principal Problem:Intentional drug overdose    Subjective:   HPI:  History obtained from the [facility]     63 y/o M with PMH of bipolar, schizophrenia with prior suicide attempts here with suspected overdose. He asked the wife to stay at another house and when she got home, she found him unconscious with evidence of recent vomiting .EMS noted  noted rapid breathing, hot environment, and patient only responding to painful stimuli. He had an empty bottle of Risperdal next to him.  In the ED,  he was covered in vomit, gurgling respirations, and only localizing to pain, grunting, and not opening his eyes. He was breathing fast, in the 30's. Sats on bag valve mask were in the mid 90's. Temp was 103 ºF rectally.   Intubated for airway protection.   Lab and imaging test reviewed.  Component      Latest Ref Rng & Units 5/23/2022 5/22/2022   POC PH      7.35 - 7.45 7.494 (H) 7.278 (LL)   POC PCO2      35 - 45 mmHg 28.9 (LL) 43.9   POC PO2      80 - 100 mmHg 111 (H) 223 (H)      CT head with no acute findings. XR chest with possible aspiration.  ED work up -  He was acidotic, QRS was >100, we started bicarb at 250 cc/hr, and gave fluid bolus. Spoke with poison control, no recommendations for dantrolene as he was not having rigitity.   He will be admitted to the ICU.       Hospital Course:   5/24/22-Patient seen and examined today, resting in bed. Alert but intermittently confused, paranoid at times. Appears comfortable. Discussed continuing OMT for now, can consider ischemic evaluation pending mental status. Unsure if he would tolerate/wear LifeVest.    5/25/22-Patient seen and examined today, resting in bed. Wife at bedside. Much more  alert, oriented x 3. Feels ok. No chest pain. Febrile overnight, CXR concerning for PNA, abx initiated. Patient declined LHC, agreeable to MPI stress test. Discussed LifeVest, patient likely will decline. Results pending. Labs stable.          Review of Systems   Constitutional: Positive for fever and malaise/fatigue.   HENT: Negative.     Eyes: Negative.    Cardiovascular:  Positive for dyspnea on exertion.   Respiratory: Negative.     Endocrine: Negative.    Skin: Negative.    Musculoskeletal:  Positive for arthritis.   Gastrointestinal: Negative.    Genitourinary: Negative.    Neurological: Negative.    Psychiatric/Behavioral: Negative.     Allergic/Immunologic: Negative.    Objective:     Vital Signs (Most Recent):  Temp: 99.1 °F (37.3 °C) (05/25/22 1158)  Pulse: 82 (05/25/22 1158)  Resp: 18 (05/25/22 1158)  BP: (!) 94/56 (05/25/22 1158)  SpO2: (!) 90 % (05/25/22 1158)   Vital Signs (24h Range):  Temp:  [98 °F (36.7 °C)-101.3 °F (38.5 °C)] 99.1 °F (37.3 °C)  Pulse:  [74-98] 82  Resp:  [17-20] 18  SpO2:  [90 %-94 %] 90 %  BP: ()/(55-78) 94/56     Weight: 85 kg (187 lb 6.3 oz)  Body mass index is 28.49 kg/m².     SpO2: (!) 90 %  O2 Device (Oxygen Therapy): room air      Intake/Output Summary (Last 24 hours) at 5/25/2022 1346  Last data filed at 5/25/2022 0925  Gross per 24 hour   Intake 360 ml   Output --   Net 360 ml       Lines/Drains/Airways       Peripherally Inserted Central Catheter Line  Duration             PICC Double Lumen 05/23/22 0425 left basilic 2 days              Peripheral Intravenous Line  Duration                  Peripheral IV - Single Lumen 05/23/22 0030 20 G Left;Posterior Hand 2 days                    Physical Exam  Vitals and nursing note reviewed.   Constitutional:       General: He is not in acute distress.     Appearance: Normal appearance. He is well-developed. He is not diaphoretic.   HENT:      Head: Normocephalic and atraumatic.   Eyes:      General:         Right eye: No  discharge.         Left eye: No discharge.      Pupils: Pupils are equal, round, and reactive to light.   Neck:      Thyroid: No thyromegaly.      Vascular: No JVD.      Trachea: No tracheal deviation.   Cardiovascular:      Rate and Rhythm: Normal rate and regular rhythm.      Heart sounds: Normal heart sounds, S1 normal and S2 normal. No murmur heard.  Pulmonary:      Effort: Pulmonary effort is normal. No respiratory distress.      Breath sounds: No wheezing.      Comments: Coarse BS at bases  Abdominal:      General: There is no distension.      Tenderness: There is no rebound.   Musculoskeletal:      Cervical back: Neck supple.      Right lower leg: No edema.      Left lower leg: No edema.   Skin:     General: Skin is warm and dry.      Findings: No erythema.   Neurological:      Mental Status: He is alert and oriented to person, place, and time.      Comments: Flat affect   Psychiatric:         Mood and Affect: Mood normal.         Behavior: Behavior normal.       Significant Labs: CMP   Recent Labs   Lab 05/23/22  1532 05/24/22  0511 05/25/22  0605    144 142   K 3.7 4.1 4.0   * 115* 113*   CO2 25 23 20*   GLU 95 91 93   BUN 14 12 17   CREATININE 1.1 1.1 1.0   CALCIUM 9.1 9.1 9.2   ANIONGAP 8 6* 9   ESTGFRAFRICA >60 >60 >60   EGFRNONAA >60 >60 >60   , CBC   Recent Labs   Lab 05/24/22  0511 05/25/22  0605   WBC 12.60 11.16   HGB 12.3* 12.2*   HCT 35.5* 35.5*   * 132*   , Troponin   Recent Labs   Lab 05/24/22  0511 05/25/22  0605   TROPONINI 0.117* 0.068*   , and All pertinent lab results from the last 24 hours have been reviewed.    Significant Imaging: Echocardiogram: Transthoracic echo (TTE) complete (Cupid Only):   Results for orders placed or performed during the hospital encounter of 05/22/22   Echo   Result Value Ref Range    BSA 2.1 m2    IVRT [id number] msec    TDI LATERAL 0.08 m/s    Left Ventricular Outflow Tract Mean Gradient 2.15 mmHg    Left Ventricular Outflow Tract  Mean Velocity 0.7149692683 cm/s    MV stenosis pressure 1/2 time 82.711133479743935 ms    TR Max Daniel 1.69 m/s    IVC diameter 2.14 cm    Ao root annulus 4.07 cm    Ao peak daniel 1.17 m/s    Posterior Wall 1.50 (A) 0.6 - 1.1 cm    LVOT diameter 2.45 cm    LVOT peak daniel 0.96 m/s    LVOT peak VTI 18.80 cm    E wave deceleration time 283.600455803117078 msec    MV Peak A Daniel 0.99 m/s    MV Peak E Daniel 0.77 m/s    RA Major Axis 4.94 cm    PV mean gradient 0.72 mmHg    RVOT peak daniel 0.60 m/s    RVOT peak VTI 10.0 cm    IVS 1.41 (A) 0.6 - 1.1 cm    Ao VTI 20.1 cm    AV mean gradient 3 mmHg    LVIDd 5.27 3.5 - 6.0 cm    LVIDs 4.25 (A) 2.1 - 4.0 cm    Left Atrium Major Axis 5.08 cm    Left Atrium Minor Axis 4.97 cm    STJ 2.96 cm    Ascending aorta 3.13 cm    LV Systolic Volume 80.83 mL    LV Diastolic Volume 133.86 mL    TDI SEPTAL 0.10 m/s    LA size 2.62 cm    LV LATERAL E/E' RATIO 9.63 m/s    LV SEPTAL E/E' RATIO 7.70 m/s    FS 19 %    LV mass 334.22 g    Left Ventricle Relative Wall Thickness 0.57 cm    AV valve area 4.41 cm2    AV Velocity Ratio 0.82     AV index (prosthetic) 0.94     MV valve area p 1/2 method 2.68 cm2    E/A ratio 0.78     Mean e' 0.09 m/s    LVOT area 4.7 cm2    LVOT stroke volume 88.58 cm3    AV peak gradient 5 mmHg    E/E' ratio 8.56 m/s    LV Systolic Volume Index 39.4 mL/m2    LV Diastolic Volume Index 65.30 mL/m2    LV Mass Index 163 g/m2    Triscuspid Valve Regurgitation Peak Gradient 11 mmHg    LA WIDTH 2.50 cm    LA volume 27.97 cm3    Sinus 3.58 cm    TAPSE 1.44 cm    LA Volume Index 13.6 mL/m2    RA Width 4.21 cm    EF 15 %    Narrative    · The left ventricle is mildly enlarged with moderate concentric   hypertrophy and severely decreased systolic function.  · The estimated ejection fraction is 15%.  · Grade I left ventricular diastolic dysfunction.  · There are segmental left ventricular wall motion abnormalities.  · Normal right ventricular size with moderately reduced right ventricular    systolic function.  · Mild tricuspid regurgitation.  · Mechanically ventilated; cannot use inferior caval vein diameter to   estimate central venous pressure.  · The aortic root is mildly dilated.      , EKG: Reviewed, and X-Ray: CXR: X-Ray Chest 1 View (CXR): No results found for this visit on 05/22/22. and X-Ray Chest PA and Lateral (CXR): No results found for this visit on 05/22/22.    Assessment and Plan:   Patient who presents s/p intentional drug OD. Acute CHF/cardiomyopathy noted. Meds optimized. Stress test today. LifeVest discussed.    * Intentional drug overdose  Cont tx per psych, will need further inpt tx    Acute combined systolic and diastolic congestive heart failure/ Cardiomyopathy - Ischemic vs Drug induced   Newly diagnosed CHF EF 15%  Will need ischemic workup once patient is stable mentally  Start OMT, will discuss Lifevest but likely not candidate if patient cannot process/follow up as needed  Will need CHF clinic nursing education for him and family     5/24/22  -Continue OMT as tolerated-Coreg, Entresto  -Ischemic workup pending mental stability   -Likely not good candidate for LifeVest    5/25/22  -Stable  -Continue BB, Entresto  -MPI stress test today  -LifeVest discussed, patient appears disinterested at present time, unclear if he fully grasps purpose of device, will readdress in AM    Aspiration pneumonia/ Pneumonitis   Cont tx per primary/ICU team    Acute hypoxemic respiratory failure  Extubated now , stable     Schizoaffective disorder, bipolar type  Cont tx per primary team/psych         VTE Risk Mitigation (From admission, onward)         Ordered     enoxaparin injection 40 mg  Daily         05/22/22 2319     IP VTE LOW RISK PATIENT  Once         05/22/22 2149     Place sequential compression device  Until discontinued         05/22/22 2149                Mary Su PA-C  Cardiology  [facility]

## 2022-05-25 NOTE — ASSESSMENT & PLAN NOTE
Follow tracheal cultures   On Zosyn empirically   5/24-   Low suspicion for active infectious process   Zosyn discontinued per CC team  5/25-  New onset fever over night   Antibiotic include Augmentin and Doxycycline initiated

## 2022-05-25 NOTE — PLAN OF CARE
RD Recommendations     1. Continue diet as prescribed: cardiac, 1500 ml fluid restriction  2.  RD to follow up to monitor intake, tolerance, and labs.      Goals:   1.  Pt will continue to tolerate >75% estimated energy and protein needs by RD follow up.        Garima Hawkins MS, RD, LDN

## 2022-05-25 NOTE — PLAN OF CARE
Problem: Infection  Goal: Absence of Infection Signs and Symptoms  Outcome: Ongoing, Progressing     Problem: Adult Inpatient Plan of Care  Goal: Plan of Care Review  Outcome: Ongoing, Progressing  Goal: Patient-Specific Goal (Individualized)  Outcome: Ongoing, Progressing  Goal: Absence of Hospital-Acquired Illness or Injury  Outcome: Ongoing, Progressing  Goal: Optimal Comfort and Wellbeing  Outcome: Ongoing, Progressing  Goal: Readiness for Transition of Care  Outcome: Ongoing, Progressing     Problem: Fall Injury Risk  Goal: Absence of Fall and Fall-Related Injury  Outcome: Ongoing, Progressing     Problem: Fluid and Electrolyte Imbalance (Acute Kidney Injury/Impairment)  Goal: Fluid and Electrolyte Balance  Outcome: Ongoing, Progressing     Problem: Oral Intake Inadequate (Acute Kidney Injury/Impairment)  Goal: Optimal Nutrition Intake  Outcome: Ongoing, Progressing     Problem: Renal Function Impairment (Acute Kidney Injury/Impairment)  Goal: Effective Renal Function  Outcome: Ongoing, Progressing     Problem: Skin Injury Risk Increased  Goal: Skin Health and Integrity  Outcome: Ongoing, Progressing

## 2022-05-25 NOTE — PROGRESS NOTES
[facility]  Adult Nutrition  Progress Note    SUMMARY     Recommendations    1. Continue diet as prescribed: cardiac, 1500 ml fluid restriction  2.  RD to follow up to monitor intake, tolerance, and labs.     Goals:   1.  Pt will continue to tolerate >75% estimated energy and protein needs by RD follow up.     Nutrition Goal Status: new  2. Enteral Nutrition initiation within 24 hours.  Nutrition Goal Status: goal met    Communication of RD Recs: Plan of care;Sticky Note    Assessment and Plan  Nutrition Problem  Inadequate oral intake   Related to (etiology):   Decreased ability to consume sufficient energy   Signs and Symptoms (as evidenced by):   NPO, Intubated   Interventions/Recommendations (treatment strategy):  Enteral Nutrition Initiation   Collaboration with Medical Providers   Weekly Weights   Nutrition Diagnosis Status:   Resolved    Reason for Assessment  Reason For Assessment: RD f/u  Diagnosis: acute hypoxemic resp failure --> Intentional drug overdose  Relevant Medical History: depression    General Information Comments:     5/23:RD consulted for tube feeding rec's. Patient is NPO and intubated as of yesterday, Patient was admitted for suspected overdose. Patient is receiving propofol. Patient has no recent past encounters weights. Patient has no edema noted. Patients last BM is unable to be obtained. Will continue to monitor.    5/25: Pt's diet advanced to cardiac with 1500ml fluid restriction yesterday; tolerating, % per EMR. Weights stable per EMR. Continue with current nutritional plan and will continue to monitor.      Nutrition Discharge Planning: cardiac, 1500 ml fluid restriction    Nutrition Risk Screen  Nutrition Risk Screen: no indicators present    Nutrition/Diet History  Patient Reported Diet/Restrictions/Preferences:  (unknown)  Spiritual, Cultural Beliefs, Christian Practices, Values that Affect Care: no  Food Allergies: NKFA  Factors Affecting Nutritional Intake: None  "identified at this time    Anthropometrics  Temp: 99.1 °F (37.3 °C)  Height Method: Estimated  Height: 5' 8" (172.7 cm)  Height (inches): 68 in  Weight Method: Bed Scale  Weight: 84.8 kg (187 lb)  Weight (lb): 187 lb  Ideal Body Weight (IBW), Male: 154 lb  % Ideal Body Weight, Male (lb): 121.43 %  BMI (Calculated): 28.4  BMI Grade: 30 - 34.9- obesity - grade I       Labs  Pertinent Labs: reviewed  Lab Results   Component Value Date    ALBUMIN 3.2 (L) 05/22/2022    HGB 12.2 (L) 05/25/2022    HCT 35.5 (L) 05/25/2022    WBC 11.16 05/25/2022    CALCIUM 9.2 05/25/2022     Lab Results   Component Value Date     05/25/2022    K 4.0 05/25/2022    PHOS 2.0 (L) 05/23/2022    BUN 17 05/25/2022    CREATININE 1.0 05/25/2022    ESTGFRAFRICA >60 05/25/2022    EGFRNONAA >60 05/25/2022     Lab Results   Component Value Date    ALT 16 05/22/2022    AST 27 05/22/2022    ALKPHOS 41 (L) 05/22/2022    BILITOT 1.3 (H) 05/22/2022     Lab Results   Component Value Date    HDL 37 (L) 03/01/2017    CHOL 171 03/01/2017    TRIG 200 (H) 03/01/2017     Meds  Pertinent Medications: reviewed    amoxicillin-clavulanate 875-125mg  1 tablet Oral Q12H    aspirin  81 mg Per NG tube Daily    atorvastatin  40 mg Oral QHS    carvediloL  3.125 mg Oral BID    chlorhexidine  15 mL Mouth/Throat BID    doxycycline  100 mg Oral Q12H    enoxaparin  40 mg Subcutaneous Daily    famotidine  20 mg Oral BID    mupirocin   Nasal BID    polyethylene glycol  17 g Oral BID    regadenoson  0.4 mg Intravenous Once    risperiDONE  0.5 mg Oral BID    sacubitriL-valsartan  1 tablet Oral QHS    sodium chloride 0.9%  10 mL Intravenous Q8H     Continuous Infusions:  PRN Meds:sodium chloride 0.9%, acetaminophen, dextrose 10%, dextrose 10%, glucagon (human recombinant), glucose, glucose, influenza, melatonin, naloxone, OLANZapine   Physical Findings/Malnutrition Assessment  Patient does not meet at least 2 ASPEN criteria for malnutrition at this time.   Will " continue to monitor.    N/V:   not indicated   Wounds: not indicated   Edema: generalized; Scrotum 3+ (Moderate)  Last Bowel Movement: 05/24/22   GI Signs/Symptoms: no gastrointestinal signs/symptoms  Mouth/Teeth WDL: WDL except, teeth Teeth Symptoms: tooth/teeth missing  O2 Device (Oxygen Therapy): room air   Hand , Left: strong  Bruno Score: 17  NFPE not performed, pt appears well nourished    Estimated/Assessed Needs  Weight Used For Calorie Calculations: 84.8 kg (186 lb 15.2 oz)  Energy Calorie Requirements (kcal): 9274-7538 (25-30)  Energy Need Method: Kcal/kg  Protein Requirements: 56-70 (.8-1g/kg)  Weight Used For Protein Calculations: 70 kg (154 lb 5.2 oz) (IBW)  Fluid Requirements (mL): 5603-9566  Estimated Fluid Requirement Method: RDA Method  RDA Method (mL): 2120  CHO Requirement: 265-318g 50% CHO    Nutrition Prescription Ordered  Current Diet Order: cardiac diet, 1500 ml fluid restriction    Evaluation of Received Nutrient Intake  Energy Calories Required: meeting needs  Protein Required: meeting needs  Tolerance: tolerating  % Intake of Estimated Energy Needs: 75 - 100 %   % Meal Intake: 75 - 100 %    Monitor and Evaluation  Food and Nutrient Intake: energy intake  Food and Nutrient Administration: diet order  Anthropometric Measurements: weight, weight change, body mass index  Biochemical Data, Medical Tests and Procedures: electrolyte and renal panel, gastrointestinal profile, glucose/endocrine profile, inflammatory profile, lipid profile  Nutrition-Focused Physical Findings: overall appearance     Nutrition Follow-Up  Level of Risk/Frequency of Follow-up: low / Once weekly   Garima Hawkins, MS, RD, LDN

## 2022-05-25 NOTE — SUBJECTIVE & OBJECTIVE
Interval History:   New onset fever. Antibiotic initiated. Tele Psych consult obtained. Buspar discontinued as recommended by Tele Psych. Risperidone continues with PRN Zyprexa. Disposition - In-patient Psych placement once pt is fever free x 48h. Currently stable from Cardiac stand point. Pt is CEDed.       Review of Systems   Constitutional:  Positive for activity change, appetite change and fatigue. Negative for fever.   HENT:  Negative for sore throat.    Eyes:  Negative for visual disturbance.   Respiratory:  Negative for cough, chest tightness and shortness of breath.    Cardiovascular:  Negative for chest pain, palpitations and leg swelling.   Gastrointestinal:  Negative for abdominal distention, abdominal pain, constipation, diarrhea, nausea and vomiting.   Endocrine: Negative for polyuria.   Genitourinary:  Negative for decreased urine volume, dysuria, flank pain, frequency and hematuria.   Musculoskeletal:  Negative for back pain and gait problem.   Skin:  Negative for rash.   Neurological:  Negative for syncope, speech difficulty, weakness, light-headedness and headaches.   Psychiatric/Behavioral:  Positive for confusion (intermittent). Negative for hallucinations and sleep disturbance.    Objective:     Vital Signs (Most Recent):  Temp: 98 °F (36.7 °C) (05/25/22 0900)  Pulse: 90 (05/25/22 0900)  Resp: 20 (05/25/22 0900)  BP: 121/71 (05/25/22 0900)  SpO2: (!) 91 % (05/25/22 0900)   Vital Signs (24h Range):  Temp:  [98 °F (36.7 °C)-101.3 °F (38.5 °C)] 98 °F (36.7 °C)  Pulse:  [74-98] 90  Resp:  [17-20] 20  SpO2:  [90 %-94 %] 91 %  BP: (109-150)/(55-78) 121/71     Weight: 85 kg (187 lb 6.3 oz)  Body mass index is 28.49 kg/m².    Intake/Output Summary (Last 24 hours) at 5/25/2022 1120  Last data filed at 5/24/2022 2104  Gross per 24 hour   Intake 240 ml   Output --   Net 240 ml      Physical Exam  Constitutional:       General: He is not in acute distress.     Appearance: He is well-developed. He is not  diaphoretic.      Comments: Pt is oriented x 3 today. Speech is clearer    HENT:      Head: Normocephalic and atraumatic.      Mouth/Throat:      Pharynx: No oropharyngeal exudate.   Eyes:      Conjunctiva/sclera: Conjunctivae normal.      Pupils: Pupils are equal, round, and reactive to light.   Neck:      Thyroid: No thyromegaly.      Vascular: No JVD.   Cardiovascular:      Rate and Rhythm: Normal rate and regular rhythm.      Heart sounds: Normal heart sounds. No murmur heard.  Pulmonary:      Effort: Pulmonary effort is normal. No respiratory distress.      Breath sounds: Normal breath sounds. No wheezing or rales.   Chest:      Chest wall: No tenderness.   Abdominal:      General: Bowel sounds are normal. There is no distension.      Palpations: Abdomen is soft.      Tenderness: There is no abdominal tenderness. There is no guarding or rebound.   Musculoskeletal:         General: Normal range of motion.      Cervical back: Normal range of motion and neck supple.   Lymphadenopathy:      Cervical: No cervical adenopathy.   Skin:     General: Skin is warm and dry.      Findings: No rash.   Neurological:      General: No focal deficit present.      Mental Status: He is oriented to person, place, and time and easily aroused.      Cranial Nerves: No cranial nerve deficit.      Sensory: No sensory deficit.      Deep Tendon Reflexes: Reflexes normal.      Comments: Intermittent confusion   Psychiatric:         Attention and Perception: Attention normal.         Mood and Affect: Mood is anxious.         Speech: Speech is slurred and tangential.       Significant Labs: All pertinent labs within the past 24 hours have been reviewed.  BMP:   Recent Labs   Lab 05/25/22  0605   GLU 93      K 4.0   *   CO2 20*   BUN 17   CREATININE 1.0   CALCIUM 9.2   MG 2.0     CBC:   Recent Labs   Lab 05/24/22  0511 05/25/22  0605   WBC 12.60 11.16   HGB 12.3* 12.2*   HCT 35.5* 35.5*   * 132*     CMP:   Recent Labs   Lab  05/23/22  1532 05/24/22  0511 05/25/22  0605    144 142   K 3.7 4.1 4.0   * 115* 113*   CO2 25 23 20*   GLU 95 91 93   BUN 14 12 17   CREATININE 1.1 1.1 1.0   CALCIUM 9.1 9.1 9.2   ANIONGAP 8 6* 9   EGFRNONAA >60 >60 >60     Cardiac Markers:   Recent Labs   Lab 05/25/22  0605   *       Significant Imaging:

## 2022-05-25 NOTE — ASSESSMENT & PLAN NOTE
ED discussed with poison control, no recommendations for dantrolene as he was not having rigitity.   He is intubated for airway protection  .  Started on bicarbonate drip .  EKG showed QTc 428  5/23-  Extubated today   Pt verbalized taking 8 pills of Risperdal intentionally   Sitter , suicide precaution   Tele -Psych consult    5/25-   Tele Psych consult obtained   PEC/CEC continues   Transfer to In-patient psych facility once medically cleared

## 2022-05-26 LAB
AMITRIP SERPL-MCNC: <10 NG/ML
AMITRIP+NOR SERPL-MCNC: ABNORMAL NG/ML (ref 95–250)
ANION GAP SERPL CALC-SCNC: 9 MMOL/L (ref 8–16)
ANISOCYTOSIS BLD QL SMEAR: SLIGHT
BACTERIA SPEC AEROBE CULT: ABNORMAL
BACTERIA SPEC AEROBE CULT: ABNORMAL
BASOPHILS # BLD AUTO: 0.04 K/UL (ref 0–0.2)
BASOPHILS NFR BLD: 0.5 % (ref 0–1.9)
BUN SERPL-MCNC: 17 MG/DL (ref 8–23)
CALCIUM SERPL-MCNC: 9.3 MG/DL (ref 8.7–10.5)
CHLORIDE SERPL-SCNC: 111 MMOL/L (ref 95–110)
CLOMIPRAMINE SERPL-MCNC: <20 NG/ML
CLOMIPRAMINE+NOR SERPL-MCNC: ABNORMAL NG/ML (ref 220–500)
CO2 SERPL-SCNC: 21 MMOL/L (ref 23–29)
CREAT SERPL-MCNC: 1 MG/DL (ref 0.5–1.4)
DESIPRAMINE SERPL-MCNC: <10 NG/ML (ref 100–300)
DIFFERENTIAL METHOD: ABNORMAL
DOXEPIN SERPL-MCNC: <10 NG/ML
DOXEPIN+NOR SERPL-MCNC: ABNORMAL NG/ML (ref 100–300)
EOSINOPHIL # BLD AUTO: 0.2 K/UL (ref 0–0.5)
EOSINOPHIL NFR BLD: 3 % (ref 0–8)
ERYTHROCYTE [DISTWIDTH] IN BLOOD BY AUTOMATED COUNT: 13.5 % (ref 11.5–14.5)
EST. GFR  (AFRICAN AMERICAN): >60 ML/MIN/1.73 M^2
EST. GFR  (NON AFRICAN AMERICAN): >60 ML/MIN/1.73 M^2
GLUCOSE SERPL-MCNC: 96 MG/DL (ref 70–110)
GRAM STN SPEC: ABNORMAL
HCT VFR BLD AUTO: 37.1 % (ref 40–54)
HGB BLD-MCNC: 12.3 G/DL (ref 14–18)
IMIPRAMINE SERPL-MCNC: <10 NG/ML
IMIPRAMINE+DESIPR SERPL-MCNC: ABNORMAL NG/ML (ref 150–300)
IMM GRANULOCYTES # BLD AUTO: 0.03 K/UL (ref 0–0.04)
IMM GRANULOCYTES NFR BLD AUTO: 0.4 % (ref 0–0.5)
LYMPHOCYTES # BLD AUTO: 2 K/UL (ref 1–4.8)
LYMPHOCYTES NFR BLD: 26.5 % (ref 18–48)
MAGNESIUM SERPL-MCNC: 1.9 MG/DL (ref 1.6–2.6)
MCH RBC QN AUTO: 30.8 PG (ref 27–31)
MCHC RBC AUTO-ENTMCNC: 33.2 G/DL (ref 32–36)
MCV RBC AUTO: 93 FL (ref 82–98)
MONOCYTES # BLD AUTO: 0.7 K/UL (ref 0.3–1)
MONOCYTES NFR BLD: 10 % (ref 4–15)
NEUTROPHILS # BLD AUTO: 4.4 K/UL (ref 1.8–7.7)
NEUTROPHILS NFR BLD: 59.6 % (ref 38–73)
NORCLOMIPRAMINE SERPL-MCNC: <20 NG/ML
NORDOXEPIN SERPL-MCNC: <10 NG/ML
NORTRIP SERPL-MCNC: <10 NG/ML (ref 50–150)
NRBC BLD-RTO: 0 /100 WBC
PLATELET # BLD AUTO: 143 K/UL (ref 150–450)
PMV BLD AUTO: 9.6 FL (ref 9.2–12.9)
POIKILOCYTOSIS BLD QL SMEAR: SLIGHT
POTASSIUM SERPL-SCNC: 3.9 MMOL/L (ref 3.5–5.1)
PROTRIP SERPL-MCNC: <10 NG/ML (ref 70–240)
RBC # BLD AUTO: 4 M/UL (ref 4.6–6.2)
SODIUM SERPL-SCNC: 141 MMOL/L (ref 136–145)
WBC # BLD AUTO: 7.41 K/UL (ref 3.9–12.7)

## 2022-05-26 PROCEDURE — 25000003 PHARM REV CODE 250: Performed by: INTERNAL MEDICINE

## 2022-05-26 PROCEDURE — 94761 N-INVAS EAR/PLS OXIMETRY MLT: CPT

## 2022-05-26 PROCEDURE — A4216 STERILE WATER/SALINE, 10 ML: HCPCS | Performed by: INTERNAL MEDICINE

## 2022-05-26 PROCEDURE — 85025 COMPLETE CBC W/AUTO DIFF WBC: CPT | Performed by: NURSE PRACTITIONER

## 2022-05-26 PROCEDURE — 80048 BASIC METABOLIC PNL TOTAL CA: CPT | Performed by: NURSE PRACTITIONER

## 2022-05-26 PROCEDURE — 25000003 PHARM REV CODE 250: Performed by: NURSE PRACTITIONER

## 2022-05-26 PROCEDURE — 83735 ASSAY OF MAGNESIUM: CPT | Performed by: NURSE PRACTITIONER

## 2022-05-26 PROCEDURE — 63600175 PHARM REV CODE 636 W HCPCS: Performed by: INTERNAL MEDICINE

## 2022-05-26 PROCEDURE — 11000001 HC ACUTE MED/SURG PRIVATE ROOM

## 2022-05-26 RX ADMIN — DOXYCYCLINE HYCLATE 100 MG: 100 TABLET, COATED ORAL at 08:05

## 2022-05-26 RX ADMIN — MUPIROCIN: 20 OINTMENT TOPICAL at 08:05

## 2022-05-26 RX ADMIN — DOXYCYCLINE HYCLATE 100 MG: 100 TABLET, COATED ORAL at 10:05

## 2022-05-26 RX ADMIN — ENOXAPARIN SODIUM 40 MG: 40 INJECTION SUBCUTANEOUS at 05:05

## 2022-05-26 RX ADMIN — POLYETHYLENE GLYCOL 3350 17 G: 17 POWDER, FOR SOLUTION ORAL at 08:05

## 2022-05-26 RX ADMIN — Medication 10 ML: at 05:05

## 2022-05-26 RX ADMIN — CARVEDILOL 3.12 MG: 3.12 TABLET, FILM COATED ORAL at 10:05

## 2022-05-26 RX ADMIN — FAMOTIDINE 20 MG: 20 TABLET ORAL at 10:05

## 2022-05-26 RX ADMIN — Medication 10 ML: at 09:05

## 2022-05-26 RX ADMIN — FUROSEMIDE 20 MG: 20 TABLET ORAL at 10:05

## 2022-05-26 RX ADMIN — AMOXICILLIN AND CLAVULANATE POTASSIUM 1 TABLET: 875; 125 TABLET, FILM COATED ORAL at 10:05

## 2022-05-26 RX ADMIN — SACUBITRIL AND VALSARTAN 1 TABLET: 24; 26 TABLET, FILM COATED ORAL at 08:05

## 2022-05-26 RX ADMIN — ASPIRIN 81 MG CHEWABLE TABLET 81 MG: 81 TABLET CHEWABLE at 10:05

## 2022-05-26 RX ADMIN — RISPERIDONE 0.5 MG: 0.5 TABLET ORAL at 08:05

## 2022-05-26 RX ADMIN — RISPERIDONE 0.5 MG: 0.5 TABLET ORAL at 10:05

## 2022-05-26 RX ADMIN — Medication 10 ML: at 03:05

## 2022-05-26 RX ADMIN — ATORVASTATIN CALCIUM 40 MG: 40 TABLET, FILM COATED ORAL at 08:05

## 2022-05-26 RX ADMIN — FAMOTIDINE 20 MG: 20 TABLET ORAL at 08:05

## 2022-05-26 RX ADMIN — CHLORHEXIDINE GLUCONATE 0.12% ORAL RINSE 15 ML: 1.2 LIQUID ORAL at 08:05

## 2022-05-26 RX ADMIN — AMOXICILLIN AND CLAVULANATE POTASSIUM 1 TABLET: 875; 125 TABLET, FILM COATED ORAL at 08:05

## 2022-05-26 RX ADMIN — CARVEDILOL 3.12 MG: 3.12 TABLET, FILM COATED ORAL at 08:05

## 2022-05-26 RX ADMIN — MUPIROCIN: 20 OINTMENT TOPICAL at 10:05

## 2022-05-26 NOTE — SUBJECTIVE & OBJECTIVE
Review of Systems   Constitutional: Positive for malaise/fatigue.   HENT: Negative.     Cardiovascular: Negative.    Respiratory: Negative.     Endocrine: Negative.    Skin: Negative.    Musculoskeletal: Negative.    Gastrointestinal: Negative.    Genitourinary: Negative.    Neurological: Negative.    Psychiatric/Behavioral:  The patient is nervous/anxious.    Allergic/Immunologic: Negative.    Objective:     Vital Signs (Most Recent):  Temp: 98.8 °F (37.1 °C) (05/26/22 1151)  Pulse: 72 (05/26/22 1151)  Resp: 17 (05/26/22 1151)  BP: 116/70 (05/26/22 1151)  SpO2: (!) 94 % (05/26/22 1151)   Vital Signs (24h Range):  Temp:  [97.2 °F (36.2 °C)-99.7 °F (37.6 °C)] 98.8 °F (37.1 °C)  Pulse:  [66-87] 72  Resp:  [16-18] 17  SpO2:  [90 %-95 %] 94 %  BP: (106-120)/(55-74) 116/70     Weight: 84.8 kg (187 lb)  Body mass index is 28.43 kg/m².     SpO2: (!) 94 %  O2 Device (Oxygen Therapy): room air      Intake/Output Summary (Last 24 hours) at 5/26/2022 1415  Last data filed at 5/26/2022 1400  Gross per 24 hour   Intake 360 ml   Output 500 ml   Net -140 ml       Lines/Drains/Airways       Peripherally Inserted Central Catheter Line  Duration             PICC Double Lumen 05/23/22 0425 left basilic 3 days              Peripheral Intravenous Line  Duration                  Peripheral IV - Single Lumen 05/23/22 0030 20 G Left;Posterior Hand 3 days                    Physical Exam  Vitals and nursing note reviewed.   Constitutional:       General: He is not in acute distress.     Appearance: Normal appearance. He is well-developed. He is not diaphoretic.   HENT:      Head: Normocephalic and atraumatic.   Eyes:      General:         Right eye: No discharge.         Left eye: No discharge.      Pupils: Pupils are equal, round, and reactive to light.   Neck:      Thyroid: No thyromegaly.      Vascular: No JVD.      Trachea: No tracheal deviation.   Cardiovascular:      Rate and Rhythm: Normal rate and regular rhythm.      Heart  sounds: Normal heart sounds, S1 normal and S2 normal. No murmur heard.  Pulmonary:      Effort: Pulmonary effort is normal. No respiratory distress.      Breath sounds: Normal breath sounds. No wheezing or rales.   Abdominal:      General: There is no distension.      Palpations: Abdomen is soft.      Tenderness: There is no rebound.   Musculoskeletal:      Cervical back: Neck supple.      Left lower leg: No edema.   Skin:     General: Skin is warm and dry.      Findings: No erythema.   Neurological:      General: No focal deficit present.      Mental Status: He is alert and oriented to person, place, and time.   Psychiatric:         Mood and Affect: Mood normal.         Behavior: Behavior normal.         Thought Content: Thought content normal.       Significant Labs: BMP:   Recent Labs   Lab 05/25/22  0605 05/26/22  0638   GLU 93 96    141   K 4.0 3.9   * 111*   CO2 20* 21*   BUN 17 17   CREATININE 1.0 1.0   CALCIUM 9.2 9.3   MG 2.0 1.9   , CMP   Recent Labs   Lab 05/25/22  0605 05/26/22  0638    141   K 4.0 3.9   * 111*   CO2 20* 21*   GLU 93 96   BUN 17 17   CREATININE 1.0 1.0   CALCIUM 9.2 9.3   ANIONGAP 9 9   ESTGFRAFRICA >60 >60   EGFRNONAA >60 >60   , CBC   Recent Labs   Lab 05/25/22  0605 05/26/22  0638   WBC 11.16 7.41   HGB 12.2* 12.3*   HCT 35.5* 37.1*   * 143*   , Troponin   Recent Labs   Lab 05/25/22  0605   TROPONINI 0.068*   , and All pertinent lab results from the last 24 hours have been reviewed.    Significant Imaging: Echocardiogram: Transthoracic echo (TTE) complete (Cupid Only):   Results for orders placed or performed during the hospital encounter of 05/22/22   Echo   Result Value Ref Range    BSA 2.1 m2    IVRT [id number] msec    TDI LATERAL 0.08 m/s    Left Ventricular Outflow Tract Mean Gradient 2.15 mmHg    Left Ventricular Outflow Tract Mean Velocity 0.9174534589 cm/s    MV stenosis pressure 1/2 time 82.278719845303441 ms    TR Max Daniel 1.69 m/s     IVC diameter 2.14 cm    Ao root annulus 4.07 cm    Ao peak daniel 1.17 m/s    Posterior Wall 1.50 (A) 0.6 - 1.1 cm    LVOT diameter 2.45 cm    LVOT peak daniel 0.96 m/s    LVOT peak VTI 18.80 cm    E wave deceleration time [id number] msec    MV Peak A Daniel 0.99 m/s    MV Peak E Daniel 0.77 m/s    RA Major Axis 4.94 cm    PV mean gradient 0.72 mmHg    RVOT peak daniel 0.60 m/s    RVOT peak VTI 10.0 cm    IVS 1.41 (A) 0.6 - 1.1 cm    Ao VTI 20.1 cm    AV mean gradient 3 mmHg    LVIDd 5.27 3.5 - 6.0 cm    LVIDs 4.25 (A) 2.1 - 4.0 cm    Left Atrium Major Axis 5.08 cm    Left Atrium Minor Axis 4.97 cm    STJ 2.96 cm    Ascending aorta 3.13 cm    LV Systolic Volume 80.83 mL    LV Diastolic Volume 133.86 mL    TDI SEPTAL 0.10 m/s    LA size 2.62 cm    LV LATERAL E/E' RATIO 9.63 m/s    LV SEPTAL E/E' RATIO 7.70 m/s    FS 19 %    LV mass 334.22 g    Left Ventricle Relative Wall Thickness 0.57 cm    AV valve area 4.41 cm2    AV Velocity Ratio 0.82     AV index (prosthetic) 0.94     MV valve area p 1/2 method 2.68 cm2    E/A ratio 0.78     Mean e' 0.09 m/s    LVOT area 4.7 cm2    LVOT stroke volume 88.58 cm3    AV peak gradient 5 mmHg    E/E' ratio 8.56 m/s    LV Systolic Volume Index 39.4 mL/m2    LV Diastolic Volume Index 65.30 mL/m2    LV Mass Index 163 g/m2    Triscuspid Valve Regurgitation Peak Gradient 11 mmHg    LA WIDTH 2.50 cm    LA volume 27.97 cm3    Sinus 3.58 cm    TAPSE 1.44 cm    LA Volume Index 13.6 mL/m2    RA Width 4.21 cm    EF 15 %    Narrative    · The left ventricle is mildly enlarged with moderate concentric   hypertrophy and severely decreased systolic function.  · The estimated ejection fraction is 15%.  · Grade I left ventricular diastolic dysfunction.  · There are segmental left ventricular wall motion abnormalities.  · Normal right ventricular size with moderately reduced right ventricular   systolic function.  · Mild tricuspid regurgitation.  · Mechanically ventilated; cannot use inferior caval vein  diameter to   estimate central venous pressure.  · The aortic root is mildly dilated.      , EKG: Reviewed, and X-Ray: CXR: X-Ray Chest 1 View (CXR): No results found for this visit on 05/22/22. and X-Ray Chest PA and Lateral (CXR): No results found for this visit on 05/22/22.

## 2022-05-26 NOTE — PROGRESS NOTES
[facility] Medicine  Progress Note    Patient Name: Tristin Saha  MRN: [id number]  Patient Class: IP- Inpatient   Admission Date: 5/22/2022  Length of Stay: 4 days  Attending Physician: Woo Farias, *  Primary Care Provider: Shari Colon MD        Subjective:     Principal Problem:Intentional drug overdose        HPI:  5/22/2022, 5:13 PM  History obtained from the [facility]      61 y/o M with PMH of bipolar, schizophrenia with prior suicide attempts here with suspected overdose. He asked the wife to stay at another house and when she got home, she found him unconscious with evidence of recent vomiting .EMS noted  noted rapid breathing, hot environment, and patient only responding to painful stimuli. He had an empty bottle of Risperdal next to him.  In the ED,  he was covered in vomit, gurgling respirations, and only localizing to pain, grunting, and not opening his eyes. He was breathing fast, in the 30's. Sats on bag valve mask were in the mid 90's. Temp was 103 ºF rectally.   Intubated for airway protection.   Lab and imaging test reviewed.  Component      Latest Ref Rng & Units 5/23/2022 5/22/2022   POC PH      7.35 - 7.45 7.494 (H) 7.278 (LL)   POC PCO2      35 - 45 mmHg 28.9 (LL) 43.9   POC PO2      80 - 100 mmHg 111 (H) 223 (H)     CT head with no acute findings. XR chest with possible aspiration.  ED work up -  He was acidotic, QRS was >100, we started bicarb at 250 cc/hr, and gave fluid bolus. Spoke with poison control, no recommendations for dantrolene as he was not having rigitity.   He will be admitted to the ICU.        Overview/Hospital Course:  Admitted to ICU overnight on mechanical ventilation for further management of intentional drug overdose. Empty bottle of Risperdal was found at home. QTc 428 on EKG. CK 1138>1123, troponin 0.050>0.184. Lactic acid normalized 1.9>4.4>3.9. Currently requiring minimal vent support.     5/23-Extubated today after successful SAT/SBT.  Somnolent , but awaken easily, oriented to self and person. Pt verbalized taking 8 pills of Risperdal intentionally. Will consult Tele-Psych today.  WBC 14K, Hgb 12.5, Plt 151. Troponin bumped to 0.184. Will get an echocardiogram. Continue ASA, empiric  antibiotic targeting aspiration pneumonia. Creatinine improved to normal 1.0>1.6. Metabolic acidosis resolved.     5/24- Awake . Appears confused , intermittently oriented to self and place , speech is tangential, slurred  and unintelligible. Will resume home medicine Buspar and low dose risperidone. Echo suggested LVEF 15%, G1DD, segmental ventricular WMA. Cardiology consulted . Pt started on ASA, statin, BB and ARNI and will need ischemic workup and possibly Life Vest. Troponin 0.117 this morning. Awaiting Tele-Psych consult . Downgrade to Med-[facility].     5/25- Pt appears calmer , speech is much clearer today. Oriented x 3. New onset fever overnight . Tmax 101.3. Repeat CXR showed consolidation in the left mid lower lung with small effusion consistent with pneumonia. Antibiotic include Augmentin and Doxycycline inititaed . SpO2 93% on RA. WBC 11.1K, Plt 132, Cr 1.0. PCT 7.52.  Troponin trended down to 0.068. Denies chest pain or SOB. Cards suggested to continue OMT for now and ischemic workup once patient is stable mentally. Pt deemed not a good candidate for Life vest. Tele Psych consult obtained. Buspar discontinued as recommended by Tele Psych. Risperidone continues with PRN Zyprexa. Disposition - In-patient Psych placement once pt is fever free x 48h. Currently stable from Cardiac stand point. Pt is CEDed.       Interval History: patient afebrile overnight - continue PO amoxicillin and likely stable to dc to PSYCH facility tomorrow am.    Review of Systems   Constitutional:  Positive for activity change, appetite change and fatigue. Negative for fever.   HENT:  Negative for sore throat.    Eyes:  Negative for visual disturbance.   Respiratory:  Negative for  cough, chest tightness and shortness of breath.    Cardiovascular:  Negative for chest pain, palpitations and leg swelling.   Gastrointestinal:  Negative for abdominal distention, abdominal pain, constipation, diarrhea, nausea and vomiting.   Endocrine: Negative for polyuria.   Genitourinary:  Negative for decreased urine volume, dysuria, flank pain, frequency and hematuria.   Musculoskeletal:  Negative for back pain and gait problem.   Skin:  Negative for rash.   Neurological:  Negative for syncope, speech difficulty, weakness, light-headedness and headaches.   Psychiatric/Behavioral:  Positive for confusion (intermittent). Negative for hallucinations and sleep disturbance.    Objective:     Vital Signs (Most Recent):  Temp: 97.8 °F (36.6 °C) (05/26/22 0759)  Pulse: 76 (05/26/22 0915)  Resp: 18 (05/26/22 0915)  BP: 106/73 (05/26/22 0759)  SpO2: 95 % (05/26/22 0759) Vital Signs (24h Range):  Temp:  [97.2 °F (36.2 °C)-99.7 °F (37.6 °C)] 97.8 °F (36.6 °C)  Pulse:  [66-87] 76  Resp:  [17-18] 18  SpO2:  [90 %-95 %] 95 %  BP: ()/(55-87) 106/73     Weight: 84.8 kg (187 lb)  Body mass index is 28.43 kg/m².    Intake/Output Summary (Last 24 hours) at 5/26/2022 1031  Last data filed at 5/26/2022 0800  Gross per 24 hour   Intake 120 ml   Output 500 ml   Net -380 ml      Physical Exam  Constitutional:       General: He is not in acute distress.     Appearance: He is well-developed. He is not diaphoretic.      Comments: Pt is oriented x 3 today. Speech is clearer    HENT:      Head: Normocephalic and atraumatic.      Mouth/Throat:      Pharynx: No oropharyngeal exudate.   Eyes:      Conjunctiva/sclera: Conjunctivae normal.      Pupils: Pupils are equal, round, and reactive to light.   Neck:      Thyroid: No thyromegaly.      Vascular: No JVD.   Cardiovascular:      Rate and Rhythm: Normal rate and regular rhythm.      Heart sounds: Normal heart sounds. No murmur heard.  Pulmonary:      Effort: Pulmonary effort is normal.  No respiratory distress.      Breath sounds: Normal breath sounds. No wheezing or rales.   Chest:      Chest wall: No tenderness.   Abdominal:      General: Bowel sounds are normal. There is no distension.      Palpations: Abdomen is soft.      Tenderness: There is no abdominal tenderness. There is no guarding or rebound.   Musculoskeletal:         General: Normal range of motion.      Cervical back: Normal range of motion and neck supple.   Lymphadenopathy:      Cervical: No cervical adenopathy.   Skin:     General: Skin is warm and dry.      Findings: No rash.   Neurological:      General: No focal deficit present.      Mental Status: He is oriented to person, place, and time and easily aroused.      Cranial Nerves: No cranial nerve deficit.      Sensory: No sensory deficit.      Deep Tendon Reflexes: Reflexes normal.      Comments: Intermittent confusion   Psychiatric:         Attention and Perception: Attention normal.         Mood and Affect: Mood is anxious.         Speech: Speech is slurred and tangential.       Significant Labs: All pertinent labs within the past 24 hours have been reviewed.  Recent Lab Results         05/26/22  0638   05/25/22  1555        Systolic blood pressure   132       Diastolic blood pressure   87       Anion Gap 9         Aniso Slight         Baso # 0.04         Basophil % 0.5         BUN 17         Calcium 9.3         Chloride 111         CO2 21         Creatinine 1.0         HR at rest   93       Differential Method Automated         End diastolic index (mL/m2)   165       End diastolic index (mL/m2)   101       eGFR if  >60         eGFR if non  >60  Comment: Calculation used to obtain the estimated glomerular filtration  rate (eGFR) is the CKD-EPI equation.            Ejection Fraction- High Stress   73       Eos # 0.2         Eosinophil % 3.0         End systolic index (mL/m2)   64       End systolic index (mL/m2)   28       Glucose 96          Gran # (ANC) 4.4         Gran % 59.6         Hematocrit 37.1         Hemoglobin 12.3         Immature Grans (Abs) 0.03  Comment: Mild elevation in immature granulocytes is non specific and   can be seen in a variety of conditions including stress response,   acute inflammation, trauma and pregnancy. Correlation with other   laboratory and clinical findings is essential.           Immature Granulocytes 0.4         Lymph # 2.0         Lymph % 26.5         Magnesium 1.9         MCH 30.8         MCHC 33.2         MCV 93         Mono # 0.7         Mono % 10.0         MPV 9.6         nRBC 0         Nuc Rest EF   30       Nuc Stress EF   27       85% Max Predicted HR   133       Max Predicted HR   156       OHS CV CPX PATIENT IS FEMALE   0.0       OHS CV CPX PATIENT IS MALE   1.0       Peak Diatolic BP   87       Peak HR   93       Peak RPP   12,276       Peak Systolic BP   132       % Max HR Achieved   60       RPP   12,276       Platelets 143         Poikilocytosis Slight         Potassium 3.9         RBC 4.00         RDW 13.5         EF + QEF   59       Sodium 141         WBC 7.41                 Significant Imaging: I have reviewed all pertinent imaging results/findings within the past 24 hours.      Assessment/Plan:      * Intentional drug overdose  ED discussed with poison control, no recommendations for dantrolene as he was not having rigitity.   He is intubated for airway protection  .  Started on bicarbonate drip .  EKG showed QTc 428  5/23-  Extubated today   Pt verbalized taking 8 pills of Risperdal intentionally   Sitter , suicide precaution   Tele -Psych consult    5/25-   Tele Psych consult obtained   PEC/CEC continues   Transfer to In-patient psych facility once medically cleared     Encephalopathy, toxic/ [place]   - Likely related to drug overdose   -Monitor clinical course   5/25-  -Improving       Acute combined systolic and diastolic congestive heart failure/ Cardiomyopathy - Ischemic vs Drug induced    Patient is identified as having Combined Systolic and Diastolic heart failure that is Acute. CHF is currently controlled. Latest ECHO performed and demonstrates- Results for orders placed during the hospital encounter of 05/22/22    Echo    Interpretation Summary  · The left ventricle is mildly enlarged with moderate concentric hypertrophy and severely decreased systolic function.  · The estimated ejection fraction is 15%.  · Grade I left ventricular diastolic dysfunction.  · There are segmental left ventricular wall motion abnormalities.  · Normal right ventricular size with moderately reduced right ventricular systolic function.  · Mild tricuspid regurgitation.  · Mechanically ventilated; cannot use inferior caval vein diameter to estimate central venous pressure.  · The aortic root is mildly dilated.  . Continue Beta Blocker and ARNI and monitor clinical status closely. Monitor on telemetry. Patient is off CHF pathway.  Monitor strict Is&Os and daily weights.  Place on fluid restriction of 1.5 L. Continue to stress to patient importance of self efficacy and  on diet for CHF. Last BNP reviewed- and noted below   Recent Labs   Lab 05/25/22  0605   *   .  5/25-   Troponin trended down to 0.068.   Denies chest pain or SOB.   Cards suggested to continue OMT for now and ischemic workup once patient is stable mentally.   Pt deemed not a good candidate for Life vest.  Continue ASA, Statin, BB, ARNI    On mechanically assisted ventilation, S/P Extubation 5/23/22        Aspiration pneumonia/ Pneumonitis     Follow tracheal cultures   On Zosyn empirically   5/24-   Low suspicion for active infectious process   Zosyn discontinued per CC team  5/25-  New onset fever over night   Antibiotic include Augmentin and Doxycycline initiated     Acute hypoxemic respiratory failure  Patient with Hypoxic Respiratory failure which is Acute.  he is not on home oxygen. Supplemental oxygen was provided and noted-  .    Signs/symptoms of respiratory failure include- respiratory distress. Contributing diagnoses includes - Aspiration Labs and images were reviewed. Patient Has recent ABG, which has been reviewed. Will treat underlying causes and adjust management of respiratory failure as follows-   Will continue Zosyn, ventilatory support , critical care follow up   5/23-  Extubated to NC today   Wean FiO2 as tolerated   Monitor clinical course   5/24-  Satisfactory SpO2 on RA    Schizoaffective disorder, bipolar type  Tele -Psych consult   Pt will need inpatient psychiatry evaluation   5/24-  Resume home meds - Buspar and low dose Risperidone   5/25-  Tele-Psych consult obtained   Buspar discontinued per recs  Continue Risperidone and PRN Zyprexa       VTE Risk Mitigation (From admission, onward)         Ordered     enoxaparin injection 40 mg  Daily         05/22/22 2315     IP VTE LOW RISK PATIENT  Once         05/22/22 2149     Place sequential compression device  Until discontinued         05/22/22 2149                Discharge Planning   DADA:      Code Status: Full Code   Is the patient medically ready for discharge?:     Reason for patient still in hospital (select all that apply): Patient trending condition, Treatment and Pending disposition  Discharge Plan A: [facility]                  RODO Boo  Department of Hospital Medicine   [place] - Med Surg

## 2022-05-26 NOTE — ASSESSMENT & PLAN NOTE
Newly diagnosed CHF EF 15%  Will need ischemic workup once patient is stable mentally  Start OMT, will discuss Lifevest but likely not candidate if patient cannot process/follow up as needed  Will need CHF clinic nursing education for him and family     5/24/22  -Continue OMT as tolerated-Coreg, Entresto  -Ischemic workup pending mental stability   -Likely not good candidate for LifeVest    5/25/22  -Stable  -Continue BB, Entresto  -MPI stress test today  -LifeVest discussed, patient appears disinterested at present time, unclear if he fully grasps purpose of device, will readdress in AM    5/26/22  -Stable, MPI stress test negative for ischemia, +fixed defects  -Continue BB, Entresto, ASA  -Not interested in LifeVest

## 2022-05-26 NOTE — SUBJECTIVE & OBJECTIVE
Interval History: patient afebrile overnight - continue PO amoxicillin and likely stable to dc to PSYCH facility tomorrow am.    Review of Systems   Constitutional:  Positive for activity change, appetite change and fatigue. Negative for fever.   HENT:  Negative for sore throat.    Eyes:  Negative for visual disturbance.   Respiratory:  Negative for cough, chest tightness and shortness of breath.    Cardiovascular:  Negative for chest pain, palpitations and leg swelling.   Gastrointestinal:  Negative for abdominal distention, abdominal pain, constipation, diarrhea, nausea and vomiting.   Endocrine: Negative for polyuria.   Genitourinary:  Negative for decreased urine volume, dysuria, flank pain, frequency and hematuria.   Musculoskeletal:  Negative for back pain and gait problem.   Skin:  Negative for rash.   Neurological:  Negative for syncope, speech difficulty, weakness, light-headedness and headaches.   Psychiatric/Behavioral:  Positive for confusion (intermittent). Negative for hallucinations and sleep disturbance.    Objective:     Vital Signs (Most Recent):  Temp: 97.8 °F (36.6 °C) (05/26/22 0759)  Pulse: 76 (05/26/22 0915)  Resp: 18 (05/26/22 0915)  BP: 106/73 (05/26/22 0759)  SpO2: 95 % (05/26/22 0759) Vital Signs (24h Range):  Temp:  [97.2 °F (36.2 °C)-99.7 °F (37.6 °C)] 97.8 °F (36.6 °C)  Pulse:  [66-87] 76  Resp:  [17-18] 18  SpO2:  [90 %-95 %] 95 %  BP: ()/(55-87) 106/73     Weight: 84.8 kg (187 lb)  Body mass index is 28.43 kg/m².    Intake/Output Summary (Last 24 hours) at 5/26/2022 1031  Last data filed at 5/26/2022 0800  Gross per 24 hour   Intake 120 ml   Output 500 ml   Net -380 ml      Physical Exam  Constitutional:       General: He is not in acute distress.     Appearance: He is well-developed. He is not diaphoretic.      Comments: Pt is oriented x 3 today. Speech is clearer    HENT:      Head: Normocephalic and atraumatic.      Mouth/Throat:      Pharynx: No oropharyngeal exudate.    Eyes:      Conjunctiva/sclera: Conjunctivae normal.      Pupils: Pupils are equal, round, and reactive to light.   Neck:      Thyroid: No thyromegaly.      Vascular: No JVD.   Cardiovascular:      Rate and Rhythm: Normal rate and regular rhythm.      Heart sounds: Normal heart sounds. No murmur heard.  Pulmonary:      Effort: Pulmonary effort is normal. No respiratory distress.      Breath sounds: Normal breath sounds. No wheezing or rales.   Chest:      Chest wall: No tenderness.   Abdominal:      General: Bowel sounds are normal. There is no distension.      Palpations: Abdomen is soft.      Tenderness: There is no abdominal tenderness. There is no guarding or rebound.   Musculoskeletal:         General: Normal range of motion.      Cervical back: Normal range of motion and neck supple.   Lymphadenopathy:      Cervical: No cervical adenopathy.   Skin:     General: Skin is warm and dry.      Findings: No rash.   Neurological:      General: No focal deficit present.      Mental Status: He is oriented to person, place, and time and easily aroused.      Cranial Nerves: No cranial nerve deficit.      Sensory: No sensory deficit.      Deep Tendon Reflexes: Reflexes normal.      Comments: Intermittent confusion   Psychiatric:         Attention and Perception: Attention normal.         Mood and Affect: Mood is anxious.         Speech: Speech is slurred and tangential.       Significant Labs: All pertinent labs within the past 24 hours have been reviewed.  Recent Lab Results         05/26/22  0638   05/25/22  1555        Systolic blood pressure   132       Diastolic blood pressure   87       Anion Gap 9         Aniso Slight         Baso # 0.04         Basophil % 0.5         BUN 17         Calcium 9.3         Chloride 111         CO2 21         Creatinine 1.0         HR at rest   93       Differential Method Automated         End diastolic index (mL/m2)   165       End diastolic index (mL/m2)   101       eGFR if   American >60         eGFR if non  >60  Comment: Calculation used to obtain the estimated glomerular filtration  rate (eGFR) is the CKD-EPI equation.            Ejection Fraction- High Stress   73       Eos # 0.2         Eosinophil % 3.0         End systolic index (mL/m2)   64       End systolic index (mL/m2)   28       Glucose 96         Gran # (ANC) 4.4         Gran % 59.6         Hematocrit 37.1         Hemoglobin 12.3         Immature Grans (Abs) 0.03  Comment: Mild elevation in immature granulocytes is non specific and   can be seen in a variety of conditions including stress response,   acute inflammation, trauma and pregnancy. Correlation with other   laboratory and clinical findings is essential.           Immature Granulocytes 0.4         Lymph # 2.0         Lymph % 26.5         Magnesium 1.9         MCH 30.8         MCHC 33.2         MCV 93         Mono # 0.7         Mono % 10.0         MPV 9.6         nRBC 0         Nuc Rest EF   30       Nuc Stress EF   27       85% Max Predicted HR   133       Max Predicted HR   156       OHS CV CPX PATIENT IS FEMALE   0.0       OHS CV CPX PATIENT IS MALE   1.0       Peak Diatolic BP   87       Peak HR   93       Peak RPP   12,276       Peak Systolic BP   132       % Max HR Achieved   60       RPP   12,276       Platelets 143         Poikilocytosis Slight         Potassium 3.9         RBC 4.00         RDW 13.5         EF + QEF   59       Sodium 141         WBC 7.41                 Significant Imaging: I have reviewed all pertinent imaging results/findings within the past 24 hours.

## 2022-05-26 NOTE — PLAN OF CARE
AAOx4. Sitter at bedside. Pt remained free from fall and injury. No sign of any distress noted. Safety precautions alert. Pt asked to call for assistance if needed. IV access clean dry and intact saline locked. Tele monitoring normal sinus rhythm. Chart checked reviewed. VSS. Will continue to monitor on going.

## 2022-05-26 NOTE — PROGRESS NOTES
[facility] - [facility]  Cardiology  Progress Note    Patient Name: Tristin Saha  MRN: [id number]  Admission Date: 5/22/2022  Hospital Length of Stay: 4 days  Code Status: Full Code   Attending Physician: Woo Farias, *   Primary Care Physician: Shari Colon MD  Expected Discharge Date:   Principal Problem:Intentional drug overdose    Subjective:   HPI:  History obtained from the [facility]     61 y/o M with PMH of bipolar, schizophrenia with prior suicide attempts here with suspected overdose. He asked the wife to stay at another house and when she got home, she found him unconscious with evidence of recent vomiting .EMS noted  noted rapid breathing, hot environment, and patient only responding to painful stimuli. He had an empty bottle of Risperdal next to him.  In the ED,  he was covered in vomit, gurgling respirations, and only localizing to pain, grunting, and not opening his eyes. He was breathing fast, in the 30's. Sats on bag valve mask were in the mid 90's. Temp was 103 ºF rectally.   Intubated for airway protection.   Lab and imaging test reviewed.  Component      Latest Ref Rng & Units 5/23/2022 5/22/2022   POC PH      7.35 - 7.45 7.494 (H) 7.278 (LL)   POC PCO2      35 - 45 mmHg 28.9 (LL) 43.9   POC PO2      80 - 100 mmHg 111 (H) 223 (H)      CT head with no acute findings. XR chest with possible aspiration.  ED work up -  He was acidotic, QRS was >100, we started bicarb at 250 cc/hr, and gave fluid bolus. Spoke with poison control, no recommendations for dantrolene as he was not having rigitity.   He will be admitted to the ICU.     Hospital Course:   5/24/22-Patient seen and examined today, resting in bed. Alert but intermittently confused, paranoid at times. Appears comfortable. Discussed continuing OMT for now, can consider ischemic evaluation pending mental status. Unsure if he would tolerate/wear LifeVest.    5/25/22-Patient seen and examined today, resting in bed. Wife at bedside. Much more  alert, oriented x 3. Feels ok. No chest pain. Febrile overnight, CXR concerning for PNA, abx initiated. Patient declined LHC, agreeable to MPI stress test. Discussed LifeVest, patient likely will decline. Results pending. Labs stable.    5/26/22-Patient seen and examined today, lying in bed. Feels well. Wants to go home. No chest pain or SOB. MPI stress test no ischemia, +fixed defects. Does not want LifeVest. Labs stable.          Review of Systems   Constitutional: Positive for malaise/fatigue.   HENT: Negative.     Cardiovascular: Negative.    Respiratory: Negative.     Endocrine: Negative.    Skin: Negative.    Musculoskeletal: Negative.    Gastrointestinal: Negative.    Genitourinary: Negative.    Neurological: Negative.    Psychiatric/Behavioral:  The patient is nervous/anxious.    Allergic/Immunologic: Negative.    Objective:     Vital Signs (Most Recent):  Temp: 98.8 °F (37.1 °C) (05/26/22 1151)  Pulse: 72 (05/26/22 1151)  Resp: 17 (05/26/22 1151)  BP: 116/70 (05/26/22 1151)  SpO2: (!) 94 % (05/26/22 1151)   Vital Signs (24h Range):  Temp:  [97.2 °F (36.2 °C)-99.7 °F (37.6 °C)] 98.8 °F (37.1 °C)  Pulse:  [66-87] 72  Resp:  [16-18] 17  SpO2:  [90 %-95 %] 94 %  BP: (106-120)/(55-74) 116/70     Weight: 84.8 kg (187 lb)  Body mass index is 28.43 kg/m².     SpO2: (!) 94 %  O2 Device (Oxygen Therapy): room air      Intake/Output Summary (Last 24 hours) at 5/26/2022 1415  Last data filed at 5/26/2022 1400  Gross per 24 hour   Intake 360 ml   Output 500 ml   Net -140 ml       Lines/Drains/Airways       Peripherally Inserted Central Catheter Line  Duration             PICC Double Lumen 05/23/22 0425 left basilic 3 days              Peripheral Intravenous Line  Duration                  Peripheral IV - Single Lumen 05/23/22 0030 20 G Left;Posterior Hand 3 days                    Physical Exam  Vitals and nursing note reviewed.   Constitutional:       General: He is not in acute distress.     Appearance: Normal  appearance. He is well-developed. He is not diaphoretic.   HENT:      Head: Normocephalic and atraumatic.   Eyes:      General:         Right eye: No discharge.         Left eye: No discharge.      Pupils: Pupils are equal, round, and reactive to light.   Neck:      Thyroid: No thyromegaly.      Vascular: No JVD.      Trachea: No tracheal deviation.   Cardiovascular:      Rate and Rhythm: Normal rate and regular rhythm.      Heart sounds: Normal heart sounds, S1 normal and S2 normal. No murmur heard.  Pulmonary:      Effort: Pulmonary effort is normal. No respiratory distress.      Breath sounds: Normal breath sounds. No wheezing or rales.   Abdominal:      General: There is no distension.      Palpations: Abdomen is soft.      Tenderness: There is no rebound.   Musculoskeletal:      Cervical back: Neck supple.      Left lower leg: No edema.   Skin:     General: Skin is warm and dry.      Findings: No erythema.   Neurological:      General: No focal deficit present.      Mental Status: He is alert and oriented to person, place, and time.   Psychiatric:         Mood and Affect: Mood normal.         Behavior: Behavior normal.         Thought Content: Thought content normal.       Significant Labs: BMP:   Recent Labs   Lab 05/25/22  0605 05/26/22  0638   GLU 93 96    141   K 4.0 3.9   * 111*   CO2 20* 21*   BUN 17 17   CREATININE 1.0 1.0   CALCIUM 9.2 9.3   MG 2.0 1.9   , CMP   Recent Labs   Lab 05/25/22  0605 05/26/22  0638    141   K 4.0 3.9   * 111*   CO2 20* 21*   GLU 93 96   BUN 17 17   CREATININE 1.0 1.0   CALCIUM 9.2 9.3   ANIONGAP 9 9   ESTGFRAFRICA >60 >60   EGFRNONAA >60 >60   , CBC   Recent Labs   Lab 05/25/22  0605 05/26/22  0638   WBC 11.16 7.41   HGB 12.2* 12.3*   HCT 35.5* 37.1*   * 143*   , Troponin   Recent Labs   Lab 05/25/22  0605   TROPONINI 0.068*   , and All pertinent lab results from the last 24 hours have been reviewed.    Significant Imaging: Echocardiogram:  Transthoracic echo (TTE) complete (Cupid Only):   Results for orders placed or performed during the hospital encounter of 05/22/22   Echo   Result Value Ref Range    BSA 2.1 m2    IVRT [id number] msec    TDI LATERAL 0.08 m/s    Left Ventricular Outflow Tract Mean Gradient 2.15 mmHg    Left Ventricular Outflow Tract Mean Velocity 0.7128054917 cm/s    MV stenosis pressure 1/2 time 82.792783281616869 ms    TR Max Daniel 1.69 m/s    IVC diameter 2.14 cm    Ao root annulus 4.07 cm    Ao peak daniel 1.17 m/s    Posterior Wall 1.50 (A) 0.6 - 1.1 cm    LVOT diameter 2.45 cm    LVOT peak daniel 0.96 m/s    LVOT peak VTI 18.80 cm    E wave deceleration time 283.318556967085730 msec    MV Peak A Daniel 0.99 m/s    MV Peak E Daniel 0.77 m/s    RA Major Axis 4.94 cm    PV mean gradient 0.72 mmHg    RVOT peak daniel 0.60 m/s    RVOT peak VTI 10.0 cm    IVS 1.41 (A) 0.6 - 1.1 cm    Ao VTI 20.1 cm    AV mean gradient 3 mmHg    LVIDd 5.27 3.5 - 6.0 cm    LVIDs 4.25 (A) 2.1 - 4.0 cm    Left Atrium Major Axis 5.08 cm    Left Atrium Minor Axis 4.97 cm    STJ 2.96 cm    Ascending aorta 3.13 cm    LV Systolic Volume 80.83 mL    LV Diastolic Volume 133.86 mL    TDI SEPTAL 0.10 m/s    LA size 2.62 cm    LV LATERAL E/E' RATIO 9.63 m/s    LV SEPTAL E/E' RATIO 7.70 m/s    FS 19 %    LV mass 334.22 g    Left Ventricle Relative Wall Thickness 0.57 cm    AV valve area 4.41 cm2    AV Velocity Ratio 0.82     AV index (prosthetic) 0.94     MV valve area p 1/2 method 2.68 cm2    E/A ratio 0.78     Mean e' 0.09 m/s    LVOT area 4.7 cm2    LVOT stroke volume 88.58 cm3    AV peak gradient 5 mmHg    E/E' ratio 8.56 m/s    LV Systolic Volume Index 39.4 mL/m2    LV Diastolic Volume Index 65.30 mL/m2    LV Mass Index 163 g/m2    Triscuspid Valve Regurgitation Peak Gradient 11 mmHg    LA WIDTH 2.50 cm    LA volume 27.97 cm3    Sinus 3.58 cm    TAPSE 1.44 cm    LA Volume Index 13.6 mL/m2    RA Width 4.21 cm    EF 15 %    Narrative    · The left ventricle is mildly  enlarged with moderate concentric   hypertrophy and severely decreased systolic function.  · The estimated ejection fraction is 15%.  · Grade I left ventricular diastolic dysfunction.  · There are segmental left ventricular wall motion abnormalities.  · Normal right ventricular size with moderately reduced right ventricular   systolic function.  · Mild tricuspid regurgitation.  · Mechanically ventilated; cannot use inferior caval vein diameter to   estimate central venous pressure.  · The aortic root is mildly dilated.      , EKG: Reviewed, and X-Ray: CXR: X-Ray Chest 1 View (CXR): No results found for this visit on 05/22/22. and X-Ray Chest PA and Lateral (CXR): No results found for this visit on 05/22/22.    Assessment and Plan:   Patient who presents s/p drug overdose, with acute CHF/cardiomyopathy. MPI stress test negative for ischemia. Continue OMT as tolerated. Declined LifeVest.    * Intentional drug overdose  Cont tx per psych, will need further inpt tx    Acute combined systolic and diastolic congestive heart failure/ Cardiomyopathy - Ischemic vs Drug induced   Newly diagnosed CHF EF 15%  Will need ischemic workup once patient is stable mentally  Start OMT, will discuss Lifevest but likely not candidate if patient cannot process/follow up as needed  Will need CHF clinic nursing education for him and family     5/24/22  -Continue OMT as tolerated-Coreg, Entresto  -Ischemic workup pending mental stability   -Likely not good candidate for LifeVest    5/25/22  -Stable  -Continue BB, Entresto  -MPI stress test today  -LifeVest discussed, patient appears disinterested at present time, unclear if he fully grasps purpose of device, will readdress in AM    5/26/22  -Stable, MPI stress test negative for ischemia, +fixed defects  -Continue BB, Entresto, ASA  -Not interested in LifeVest    Aspiration pneumonia/ Pneumonitis   Cont tx per primary/ICU team    Acute hypoxemic respiratory failure  Extubated now , stable      Schizoaffective disorder, bipolar type  Cont tx per primary team/psych         VTE Risk Mitigation (From admission, onward)         Ordered     enoxaparin injection 40 mg  Daily         05/22/22 2311     IP VTE LOW RISK PATIENT  Once         05/22/22 2149     Place sequential compression device  Until discontinued         05/22/22 2149                Mary Su PA-C  Cardiology  [facility]

## 2022-05-27 PROBLEM — Z99.11 ON MECHANICALLY ASSISTED VENTILATION: Status: RESOLVED | Noted: 2022-05-23 | Resolved: 2022-05-27

## 2022-05-27 LAB
ANION GAP SERPL CALC-SCNC: 9 MMOL/L (ref 8–16)
BASOPHILS # BLD AUTO: 0.07 K/UL (ref 0–0.2)
BASOPHILS NFR BLD: 0.8 % (ref 0–1.9)
BUN SERPL-MCNC: 22 MG/DL (ref 8–23)
CALCIUM SERPL-MCNC: 9.2 MG/DL (ref 8.7–10.5)
CHLORIDE SERPL-SCNC: 112 MMOL/L (ref 95–110)
CO2 SERPL-SCNC: 22 MMOL/L (ref 23–29)
CREAT SERPL-MCNC: 1.1 MG/DL (ref 0.5–1.4)
DIFFERENTIAL METHOD: ABNORMAL
EOSINOPHIL # BLD AUTO: 0.3 K/UL (ref 0–0.5)
EOSINOPHIL NFR BLD: 3.8 % (ref 0–8)
ERYTHROCYTE [DISTWIDTH] IN BLOOD BY AUTOMATED COUNT: 13.3 % (ref 11.5–14.5)
EST. GFR  (AFRICAN AMERICAN): >60 ML/MIN/1.73 M^2
EST. GFR  (NON AFRICAN AMERICAN): >60 ML/MIN/1.73 M^2
GLUCOSE SERPL-MCNC: 119 MG/DL (ref 70–110)
HCT VFR BLD AUTO: 37 % (ref 40–54)
HGB BLD-MCNC: 12.8 G/DL (ref 14–18)
IMM GRANULOCYTES # BLD AUTO: 0.12 K/UL (ref 0–0.04)
IMM GRANULOCYTES NFR BLD AUTO: 1.4 % (ref 0–0.5)
LYMPHOCYTES # BLD AUTO: 2.3 K/UL (ref 1–4.8)
LYMPHOCYTES NFR BLD: 25.6 % (ref 18–48)
MAGNESIUM SERPL-MCNC: 1.9 MG/DL (ref 1.6–2.6)
MCH RBC QN AUTO: 31.3 PG (ref 27–31)
MCHC RBC AUTO-ENTMCNC: 34.6 G/DL (ref 32–36)
MCV RBC AUTO: 91 FL (ref 82–98)
MONOCYTES # BLD AUTO: 0.7 K/UL (ref 0.3–1)
MONOCYTES NFR BLD: 8.3 % (ref 4–15)
NEUTROPHILS # BLD AUTO: 5.3 K/UL (ref 1.8–7.7)
NEUTROPHILS NFR BLD: 60.1 % (ref 38–73)
NRBC BLD-RTO: 0 /100 WBC
PLATELET # BLD AUTO: 126 K/UL (ref 150–450)
PMV BLD AUTO: 10 FL (ref 9.2–12.9)
POTASSIUM SERPL-SCNC: 4.1 MMOL/L (ref 3.5–5.1)
RBC # BLD AUTO: 4.09 M/UL (ref 4.6–6.2)
SODIUM SERPL-SCNC: 143 MMOL/L (ref 136–145)
WBC # BLD AUTO: 8.87 K/UL (ref 3.9–12.7)

## 2022-05-27 PROCEDURE — 25000003 PHARM REV CODE 250: Performed by: INTERNAL MEDICINE

## 2022-05-27 PROCEDURE — 21400001 HC TELEMETRY ROOM

## 2022-05-27 PROCEDURE — A4216 STERILE WATER/SALINE, 10 ML: HCPCS | Performed by: INTERNAL MEDICINE

## 2022-05-27 PROCEDURE — 80048 BASIC METABOLIC PNL TOTAL CA: CPT | Performed by: NURSE PRACTITIONER

## 2022-05-27 PROCEDURE — 11000001 HC ACUTE MED/SURG PRIVATE ROOM

## 2022-05-27 PROCEDURE — 25000003 PHARM REV CODE 250: Performed by: NURSE PRACTITIONER

## 2022-05-27 PROCEDURE — 63600175 PHARM REV CODE 636 W HCPCS: Performed by: INTERNAL MEDICINE

## 2022-05-27 PROCEDURE — 85025 COMPLETE CBC W/AUTO DIFF WBC: CPT | Performed by: NURSE PRACTITIONER

## 2022-05-27 PROCEDURE — 83735 ASSAY OF MAGNESIUM: CPT | Performed by: NURSE PRACTITIONER

## 2022-05-27 RX ADMIN — FUROSEMIDE 20 MG: 20 TABLET ORAL at 09:05

## 2022-05-27 RX ADMIN — ENOXAPARIN SODIUM 40 MG: 40 INJECTION SUBCUTANEOUS at 04:05

## 2022-05-27 RX ADMIN — FAMOTIDINE 20 MG: 20 TABLET ORAL at 09:05

## 2022-05-27 RX ADMIN — ASPIRIN 81 MG CHEWABLE TABLET 81 MG: 81 TABLET CHEWABLE at 09:05

## 2022-05-27 RX ADMIN — RISPERIDONE 0.5 MG: 0.5 TABLET ORAL at 09:05

## 2022-05-27 RX ADMIN — Medication 10 ML: at 05:05

## 2022-05-27 RX ADMIN — AMOXICILLIN AND CLAVULANATE POTASSIUM 1 TABLET: 875; 125 TABLET, FILM COATED ORAL at 09:05

## 2022-05-27 RX ADMIN — CARVEDILOL 3.12 MG: 3.12 TABLET, FILM COATED ORAL at 09:05

## 2022-05-27 RX ADMIN — DOXYCYCLINE HYCLATE 100 MG: 100 TABLET, COATED ORAL at 09:05

## 2022-05-27 RX ADMIN — CHLORHEXIDINE GLUCONATE 0.12% ORAL RINSE 15 ML: 1.2 LIQUID ORAL at 09:05

## 2022-05-27 RX ADMIN — SACUBITRIL AND VALSARTAN 1 TABLET: 24; 26 TABLET, FILM COATED ORAL at 09:05

## 2022-05-27 RX ADMIN — MUPIROCIN: 20 OINTMENT TOPICAL at 09:05

## 2022-05-27 RX ADMIN — Medication 10 ML: at 02:05

## 2022-05-27 RX ADMIN — ATORVASTATIN CALCIUM 40 MG: 40 TABLET, FILM COATED ORAL at 09:05

## 2022-05-27 RX ADMIN — Medication 10 ML: at 10:05

## 2022-05-27 NOTE — ASSESSMENT & PLAN NOTE
Patient is identified as having Combined Systolic and Diastolic heart failure that is Acute. CHF is currently controlled. Latest ECHO performed and demonstrates- Results for orders placed during the hospital encounter of 05/22/22    Echo    Interpretation Summary  · The left ventricle is mildly enlarged with moderate concentric hypertrophy and severely decreased systolic function.  · The estimated ejection fraction is 15%.  · Grade I left ventricular diastolic dysfunction.  · There are segmental left ventricular wall motion abnormalities.  · Normal right ventricular size with moderately reduced right ventricular systolic function.  · Mild tricuspid regurgitation.  · Mechanically ventilated; cannot use inferior caval vein diameter to estimate central venous pressure.  · The aortic root is mildly dilated.  . Continue Beta Blocker and ARNI and monitor clinical status closely. Monitor on telemetry. Patient is off CHF pathway.  Monitor strict Is&Os and daily weights.  Place on fluid restriction of 1.5 L. Continue to stress to patient importance of self efficacy and  on diet for CHF. Last BNP reviewed- and noted below   Recent Labs   Lab 05/25/22  0605   *   .  5/25-   Troponin trended down to 0.068.   Denies chest pain or SOB.   Cards suggested to continue OMT for now and ischemic workup once patient is stable mentally.   Pt deemed not a good candidate for Life vest.  Continue ASA, Statin, BB, ARNI    Continue current medical management plan

## 2022-05-27 NOTE — PROGRESS NOTES
[facility] Medicine  Progress Note    Patient Name: Tristin Saha  MRN: [id number]  Patient Class: IP- Inpatient   Admission Date: 5/22/2022  Length of Stay: 5 days  Attending Physician: Woo aFrias, *  Primary Care Provider: Shari Colon MD        Subjective:     Principal Problem:Intentional drug overdose        HPI:  5/22/2022, 5:13 PM  History obtained from the [facility]      61 y/o M with PMH of bipolar, schizophrenia with prior suicide attempts here with suspected overdose. He asked the wife to stay at another house and when she got home, she found him unconscious with evidence of recent vomiting .EMS noted  noted rapid breathing, hot environment, and patient only responding to painful stimuli. He had an empty bottle of Risperdal next to him.  In the ED,  he was covered in vomit, gurgling respirations, and only localizing to pain, grunting, and not opening his eyes. He was breathing fast, in the 30's. Sats on bag valve mask were in the mid 90's. Temp was 103 ºF rectally.   Intubated for airway protection.   Lab and imaging test reviewed.  Component      Latest Ref Rng & Units 5/23/2022 5/22/2022   POC PH      7.35 - 7.45 7.494 (H) 7.278 (LL)   POC PCO2      35 - 45 mmHg 28.9 (LL) 43.9   POC PO2      80 - 100 mmHg 111 (H) 223 (H)     CT head with no acute findings. XR chest with possible aspiration.  ED work up -  He was acidotic, QRS was >100, we started bicarb at 250 cc/hr, and gave fluid bolus. Spoke with poison control, no recommendations for dantrolene as he was not having rigitity.   He will be admitted to the ICU.        Overview/Hospital Course:  Admitted to ICU overnight on mechanical ventilation for further management of intentional drug overdose. Empty bottle of Risperdal was found at home. QTc 428 on EKG. CK 1138>1123, troponin 0.050>0.184. Lactic acid normalized 1.9>4.4>3.9. Currently requiring minimal vent support.     5/23-Extubated today after successful SAT/SBT.  Somnolent , but awaken easily, oriented to self and person. Pt verbalized taking 8 pills of Risperdal intentionally. Will consult Tele-Psych today.  WBC 14K, Hgb 12.5, Plt 151. Troponin bumped to 0.184. Will get an echocardiogram. Continue ASA, empiric  antibiotic targeting aspiration pneumonia. Creatinine improved to normal 1.0>1.6. Metabolic acidosis resolved.     5/24- Awake . Appears confused , intermittently oriented to self and place , speech is tangential, slurred  and unintelligible. Will resume home medicine Buspar and low dose risperidone. Echo suggested LVEF 15%, G1DD, segmental ventricular WMA. Cardiology consulted . Pt started on ASA, statin, BB and ARNI and will need ischemic workup and possibly Life Vest. Troponin 0.117 this morning. Awaiting Tele-Psych consult . Downgrade to Med-Tele.     5/25- Pt appears calmer , speech is much clearer today. Oriented x 3. New onset fever overnight . Tmax 101.3. Repeat CXR showed consolidation in the left mid lower lung with small effusion consistent with pneumonia. Antibiotic include Augmentin and Doxycycline inititaed . SpO2 93% on RA. WBC 11.1K, Plt 132, Cr 1.0. PCT 7.52.  Troponin trended down to 0.068. Denies chest pain or SOB. Cards suggested to continue OMT for now and ischemic workup once patient is stable mentally. Pt deemed not a good candidate for Life vest. Tele Psych consult obtained. Buspar discontinued as recommended by Tele Psych. Risperidone continues with PRN Zyprexa. Disposition - In-patient Psych placement once pt is fever free x 48h. Currently stable from Cardiac stand point. Pt is CEDed.     05/26: patient afebrile overnight - continue PO amoxicillin and likely stable to dc to PSYCH facility tomorrow am.  5/27/22: Labs reviewed and stable. Pt medically cleared.           Review of Systems   Constitutional:  Positive for activity change, appetite change and fatigue. Negative for fever.   HENT:  Negative for sore throat.    Eyes:  Negative for  visual disturbance.   Respiratory:  Negative for cough, chest tightness and shortness of breath.    Cardiovascular:  Negative for chest pain, palpitations and leg swelling.   Gastrointestinal:  Negative for abdominal distention, abdominal pain, constipation, diarrhea, nausea and vomiting.   Endocrine: Negative for polyuria.   Genitourinary:  Negative for decreased urine volume, dysuria, flank pain, frequency and hematuria.   Musculoskeletal:  Negative for back pain and gait problem.   Skin:  Negative for rash.   Neurological:  Negative for syncope, speech difficulty, weakness, light-headedness and headaches.   Psychiatric/Behavioral:  Positive for confusion (intermittent). Negative for hallucinations and sleep disturbance.    Objective:     Vital Signs (Most Recent):  Temp: 99.5 °F (37.5 °C) (05/27/22 1223)  Pulse: 80 (05/27/22 1223)  Resp: 17 (05/27/22 1223)  BP: 128/84 (05/27/22 1223)  SpO2: (!) 94 % (05/27/22 1223)   Vital Signs (24h Range):  Temp:  [97.9 °F (36.6 °C)-99.5 °F (37.5 °C)] 99.5 °F (37.5 °C)  Pulse:  [71-84] 80  Resp:  [16-18] 17  SpO2:  [90 %-95 %] 94 %  BP: ()/(56-84) 128/84     Weight: 77.7 kg (171 lb 4.8 oz)  Body mass index is 26.05 kg/m².    Intake/Output Summary (Last 24 hours) at 5/27/2022 1224  Last data filed at 5/27/2022 0800  Gross per 24 hour   Intake 1200 ml   Output --   Net 1200 ml      Physical Exam  Constitutional:       General: He is not in acute distress.     Appearance: He is well-developed. He is not diaphoretic.      Comments: Pt is oriented x 3 today. Speech is clearer    HENT:      Head: Normocephalic and atraumatic.      Mouth/Throat:      Pharynx: No oropharyngeal exudate.   Eyes:      Conjunctiva/sclera: Conjunctivae normal.      Pupils: Pupils are equal, round, and reactive to light.   Neck:      Thyroid: No thyromegaly.      Vascular: No JVD.   Cardiovascular:      Rate and Rhythm: Normal rate and regular rhythm.      Heart sounds: Normal heart sounds. No murmur  heard.  Pulmonary:      Effort: Pulmonary effort is normal. No respiratory distress.      Breath sounds: Normal breath sounds. No wheezing or rales.   Chest:      Chest wall: No tenderness.   Abdominal:      General: Bowel sounds are normal. There is no distension.      Palpations: Abdomen is soft.      Tenderness: There is no abdominal tenderness. There is no guarding or rebound.   Musculoskeletal:         General: Normal range of motion.      Cervical back: Normal range of motion and neck supple.   Lymphadenopathy:      Cervical: No cervical adenopathy.   Skin:     General: Skin is warm and dry.      Findings: No rash.   Neurological:      General: No focal deficit present.      Mental Status: He is oriented to person, place, and time and easily aroused.      Cranial Nerves: No cranial nerve deficit.      Sensory: No sensory deficit.      Deep Tendon Reflexes: Reflexes normal.      Comments: Intermittent confusion   Psychiatric:         Attention and Perception: Attention normal.         Mood and Affect: Mood is anxious.         Speech: Speech is slurred and tangential.       Significant Labs: All pertinent labs within the past 24 hours have been reviewed.  BMP:   Recent Labs   Lab 05/27/22  0526   *      K 4.1   *   CO2 22*   BUN 22   CREATININE 1.1   CALCIUM 9.2   MG 1.9     CBC:   Recent Labs   Lab 05/26/22  0638 05/27/22  0526   WBC 7.41 8.87   HGB 12.3* 12.8*   HCT 37.1* 37.0*   * 126*       Significant Imaging: I have reviewed all pertinent imaging results/findings within the past 24 hours.      Assessment/Plan:      * Intentional drug overdose  ED discussed with poison control, no recommendations for dantrolene as he was not having rigitity.   He is intubated for airway protection  .  Started on bicarbonate drip .  EKG showed QTc 428  5/23-  Extubated today   Pt verbalized taking 8 pills of Risperdal intentionally   Sitter , suicide precaution   Tele -Psych consult    5/25-   Tele  Psych consult obtained   PEC/CEC continues   Transfer to In-patient psych facility once medically cleared     Pt medically cleared awaiting psych placement     Encephalopathy, toxic/ [place]   - Likely related to drug overdose   -Monitor clinical course     Resolved    Acute combined systolic and diastolic congestive heart failure/ Cardiomyopathy - Ischemic vs Drug induced   Patient is identified as having Combined Systolic and Diastolic heart failure that is Acute. CHF is currently controlled. Latest ECHO performed and demonstrates- Results for orders placed during the hospital encounter of 05/22/22    Echo    Interpretation Summary  · The left ventricle is mildly enlarged with moderate concentric hypertrophy and severely decreased systolic function.  · The estimated ejection fraction is 15%.  · Grade I left ventricular diastolic dysfunction.  · There are segmental left ventricular wall motion abnormalities.  · Normal right ventricular size with moderately reduced right ventricular systolic function.  · Mild tricuspid regurgitation.  · Mechanically ventilated; cannot use inferior caval vein diameter to estimate central venous pressure.  · The aortic root is mildly dilated.  . Continue Beta Blocker and ARNI and monitor clinical status closely. Monitor on telemetry. Patient is off CHF pathway.  Monitor strict Is&Os and daily weights.  Place on fluid restriction of 1.5 L. Continue to stress to patient importance of self efficacy and  on diet for CHF. Last BNP reviewed- and noted below   Recent Labs   Lab 05/25/22  0605   *   .  5/25-   Troponin trended down to 0.068.   Denies chest pain or SOB.   Cards suggested to continue OMT for now and ischemic workup once patient is stable mentally.   Pt deemed not a good candidate for Life vest.  Continue ASA, Statin, BB, ARNI    Continue current medical management plan     Aspiration pneumonia/ Pneumonitis   Remains afebrile   Continue Augmentin and doxycycline      Acute hypoxemic respiratory failure  Patient with Hypoxic Respiratory failure which is Acute.  he is not on home oxygen. Supplemental oxygen was provided and noted-  .   Signs/symptoms of respiratory failure include- respiratory distress. Contributing diagnoses includes - Aspiration Labs and images were reviewed. Patient Has recent ABG, which has been reviewed. Will treat underlying causes and adjust management of respiratory failure as follows-   Will continue Zosyn, ventilatory support , critical care follow up   5/23-  Extubated to NC today   Wean FiO2 as tolerated   Monitor clinical course   5/24-  Satisfactory SpO2 on RA    Resolved     Schizoaffective disorder, bipolar type  Tele -Psych consult   Pt will need inpatient psychiatry evaluation   5/24-  Resume home meds - Buspar and low dose Risperidone   5/25-  Tele-Psych consult obtained   Buspar discontinued per recs  Continue Risperidone and PRN Zyprexa     Continue current management       VTE Risk Mitigation (From admission, onward)         Ordered     enoxaparin injection 40 mg  Daily         05/22/22 2315     IP VTE LOW RISK PATIENT  Once         05/22/22 2149     Place sequential compression device  Until discontinued         05/22/22 2149                Discharge Planning   DADA:      Code Status: Full Code   Is the patient medically ready for discharge?:     Reason for patient still in hospital (select all that apply): Patient trending condition, Laboratory test and Treatment  Discharge Plan A: Psychiatric hospital   Discharge Delays: None known at this time              Meka Valdovinos NP  Department of Hospital Medicine   [facility] - Med S[facility]

## 2022-05-27 NOTE — ASSESSMENT & PLAN NOTE
Tele -Psych consult   Pt will need inpatient psychiatry evaluation   5/24-  Resume home meds - Buspar and low dose Risperidone   5/25-  Tele-Psych consult obtained   Buspar discontinued per recs  Continue Risperidone and PRN Zyprexa     Continue current management

## 2022-05-27 NOTE — SUBJECTIVE & OBJECTIVE
Review of Systems   Constitutional:  Positive for activity change, appetite change and fatigue. Negative for fever.   HENT:  Negative for sore throat.    Eyes:  Negative for visual disturbance.   Respiratory:  Negative for cough, chest tightness and shortness of breath.    Cardiovascular:  Negative for chest pain, palpitations and leg swelling.   Gastrointestinal:  Negative for abdominal distention, abdominal pain, constipation, diarrhea, nausea and vomiting.   Endocrine: Negative for polyuria.   Genitourinary:  Negative for decreased urine volume, dysuria, flank pain, frequency and hematuria.   Musculoskeletal:  Negative for back pain and gait problem.   Skin:  Negative for rash.   Neurological:  Negative for syncope, speech difficulty, weakness, light-headedness and headaches.   Psychiatric/Behavioral:  Positive for confusion (intermittent). Negative for hallucinations and sleep disturbance.    Objective:     Vital Signs (Most Recent):  Temp: 99.5 °F (37.5 °C) (05/27/22 1223)  Pulse: 80 (05/27/22 1223)  Resp: 17 (05/27/22 1223)  BP: 128/84 (05/27/22 1223)  SpO2: (!) 94 % (05/27/22 1223)   Vital Signs (24h Range):  Temp:  [97.9 °F (36.6 °C)-99.5 °F (37.5 °C)] 99.5 °F (37.5 °C)  Pulse:  [71-84] 80  Resp:  [16-18] 17  SpO2:  [90 %-95 %] 94 %  BP: ()/(56-84) 128/84     Weight: 77.7 kg (171 lb 4.8 oz)  Body mass index is 26.05 kg/m².    Intake/Output Summary (Last 24 hours) at 5/27/2022 1224  Last data filed at 5/27/2022 0800  Gross per 24 hour   Intake 1200 ml   Output --   Net 1200 ml      Physical Exam  Constitutional:       General: He is not in acute distress.     Appearance: He is well-developed. He is not diaphoretic.      Comments: Pt is oriented x 3 today. Speech is clearer    HENT:      Head: Normocephalic and atraumatic.      Mouth/Throat:      Pharynx: No oropharyngeal exudate.   Eyes:      Conjunctiva/sclera: Conjunctivae normal.      Pupils: Pupils are equal, round, and reactive to light.    Neck:      Thyroid: No thyromegaly.      Vascular: No JVD.   Cardiovascular:      Rate and Rhythm: Normal rate and regular rhythm.      Heart sounds: Normal heart sounds. No murmur heard.  Pulmonary:      Effort: Pulmonary effort is normal. No respiratory distress.      Breath sounds: Normal breath sounds. No wheezing or rales.   Chest:      Chest wall: No tenderness.   Abdominal:      General: Bowel sounds are normal. There is no distension.      Palpations: Abdomen is soft.      Tenderness: There is no abdominal tenderness. There is no guarding or rebound.   Musculoskeletal:         General: Normal range of motion.      Cervical back: Normal range of motion and neck supple.   Lymphadenopathy:      Cervical: No cervical adenopathy.   Skin:     General: Skin is warm and dry.      Findings: No rash.   Neurological:      General: No focal deficit present.      Mental Status: He is oriented to person, place, and time and easily aroused.      Cranial Nerves: No cranial nerve deficit.      Sensory: No sensory deficit.      Deep Tendon Reflexes: Reflexes normal.      Comments: Intermittent confusion   Psychiatric:         Attention and Perception: Attention normal.         Mood and Affect: Mood is anxious.         Speech: Speech is slurred and tangential.       Significant Labs: All pertinent labs within the past 24 hours have been reviewed.  BMP:   Recent Labs   Lab 05/27/22  0526   *      K 4.1   *   CO2 22*   BUN 22   CREATININE 1.1   CALCIUM 9.2   MG 1.9     CBC:   Recent Labs   Lab 05/26/22  0638 05/27/22  0526   WBC 7.41 8.87   HGB 12.3* 12.8*   HCT 37.1* 37.0*   * 126*       Significant Imaging: I have reviewed all pertinent imaging results/findings within the past 24 hours.

## 2022-05-27 NOTE — PLAN OF CARE
AAOx4. Sitter @ bedside. Pt remained free from fall and injury. No sign of any distress noted. Safety precautions alert. Pt asked to call for assistance if needed. IV access clean dry and intact. Chart checked reviewed. VSS. Will continue to monitor on going.

## 2022-05-27 NOTE — ASSESSMENT & PLAN NOTE
Patient with Hypoxic Respiratory failure which is Acute.  he is not on home oxygen. Supplemental oxygen was provided and noted-  .   Signs/symptoms of respiratory failure include- respiratory distress. Contributing diagnoses includes - Aspiration Labs and images were reviewed. Patient Has recent ABG, which has been reviewed. Will treat underlying causes and adjust management of respiratory failure as follows-   Will continue Zosyn, ventilatory support , critical care follow up   5/23-  Extubated to NC today   Wean FiO2 as tolerated   Monitor clinical course   5/24-  Satisfactory SpO2 on RA    Resolved

## 2022-05-27 NOTE — PLAN OF CARE
[facility]  Discharge Reassessment    Primary Care Provider: Shari Colon MD    Expected Discharge Date:     Reassessment (most recent)     Discharge Reassessment - 05/27/22 1006        Discharge Reassessment    Assessment Type Discharge Planning Reassessment     Did the patient's condition or plan change since previous assessment? No     Discharge Plan discussed with: Spouse/sig other     Communicated DADA with patient/caregiver Date not available/Unable to determine     Discharge Plan A Psychiatric hospital     Discharge Plan B Psychiatric hospital     DME Needed Upon Discharge  none     Discharge Barriers Identified None     Why the patient remains in the hospital Requires continued medical care        Post-Acute Status    Discharge Delays None known at this time               Cm will continue to follow the patient for Cm needs. Patient will dc to inpatient psych facility once medically stable.

## 2022-05-27 NOTE — ASSESSMENT & PLAN NOTE
ED discussed with poison control, no recommendations for dantrolene as he was not having rigitity.   He is intubated for airway protection  .  Started on bicarbonate drip .  EKG showed QTc 428  5/23-  Extubated today   Pt verbalized taking 8 pills of Risperdal intentionally   Sitter , suicide precaution   Tele -Psych consult    5/25-   Tele Psych consult obtained   PEC/CEC continues   Transfer to In-patient psych facility once medically cleared     Pt medically cleared awaiting psych placement

## 2022-05-28 VITALS
BODY MASS INDEX: 25.7 KG/M2 | SYSTOLIC BLOOD PRESSURE: 114 MMHG | RESPIRATION RATE: 22 BRPM | WEIGHT: 169.56 LBS | HEART RATE: 78 BPM | DIASTOLIC BLOOD PRESSURE: 68 MMHG | TEMPERATURE: 99 F | OXYGEN SATURATION: 93 % | HEIGHT: 68 IN

## 2022-05-28 PROBLEM — U07.1 COVID-19: Status: ACTIVE | Noted: 2022-05-28

## 2022-05-28 LAB
ANION GAP SERPL CALC-SCNC: 9 MMOL/L (ref 8–16)
ANISOCYTOSIS BLD QL SMEAR: SLIGHT
BACTERIA BLD CULT: NORMAL
BACTERIA BLD CULT: NORMAL
BASOPHILS # BLD AUTO: 0.07 K/UL (ref 0–0.2)
BASOPHILS NFR BLD: 0.7 % (ref 0–1.9)
BUN SERPL-MCNC: 23 MG/DL (ref 8–23)
CALCIUM SERPL-MCNC: 9.3 MG/DL (ref 8.7–10.5)
CHLORIDE SERPL-SCNC: 111 MMOL/L (ref 95–110)
CO2 SERPL-SCNC: 22 MMOL/L (ref 23–29)
CREAT SERPL-MCNC: 0.9 MG/DL (ref 0.5–1.4)
DIFFERENTIAL METHOD: ABNORMAL
EOSINOPHIL # BLD AUTO: 0.3 K/UL (ref 0–0.5)
EOSINOPHIL NFR BLD: 3.3 % (ref 0–8)
ERYTHROCYTE [DISTWIDTH] IN BLOOD BY AUTOMATED COUNT: 13.4 % (ref 11.5–14.5)
EST. GFR  (AFRICAN AMERICAN): >60 ML/MIN/1.73 M^2
EST. GFR  (NON AFRICAN AMERICAN): >60 ML/MIN/1.73 M^2
GLUCOSE SERPL-MCNC: 87 MG/DL (ref 70–110)
HCT VFR BLD AUTO: 38 % (ref 40–54)
HGB BLD-MCNC: 13.1 G/DL (ref 14–18)
IMM GRANULOCYTES # BLD AUTO: 0.21 K/UL (ref 0–0.04)
IMM GRANULOCYTES NFR BLD AUTO: 2.2 % (ref 0–0.5)
LYMPHOCYTES # BLD AUTO: 2.7 K/UL (ref 1–4.8)
LYMPHOCYTES NFR BLD: 27.9 % (ref 18–48)
MAGNESIUM SERPL-MCNC: 2 MG/DL (ref 1.6–2.6)
MCH RBC QN AUTO: 31.2 PG (ref 27–31)
MCHC RBC AUTO-ENTMCNC: 34.5 G/DL (ref 32–36)
MCV RBC AUTO: 91 FL (ref 82–98)
MONOCYTES # BLD AUTO: 1 K/UL (ref 0.3–1)
MONOCYTES NFR BLD: 9.8 % (ref 4–15)
NEUTROPHILS # BLD AUTO: 5.4 K/UL (ref 1.8–7.7)
NEUTROPHILS NFR BLD: 56.1 % (ref 38–73)
NRBC BLD-RTO: 0 /100 WBC
PLATELET # BLD AUTO: 190 K/UL (ref 150–450)
PLATELET BLD QL SMEAR: ABNORMAL
PMV BLD AUTO: 10.1 FL (ref 9.2–12.9)
POIKILOCYTOSIS BLD QL SMEAR: SLIGHT
POTASSIUM SERPL-SCNC: 4.4 MMOL/L (ref 3.5–5.1)
RBC # BLD AUTO: 4.2 M/UL (ref 4.6–6.2)
SARS-COV-2 RDRP RESP QL NAA+PROBE: POSITIVE
SODIUM SERPL-SCNC: 142 MMOL/L (ref 136–145)
WBC # BLD AUTO: 9.67 K/UL (ref 3.9–12.7)

## 2022-05-28 PROCEDURE — A4216 STERILE WATER/SALINE, 10 ML: HCPCS | Performed by: INTERNAL MEDICINE

## 2022-05-28 PROCEDURE — 85025 COMPLETE CBC W/AUTO DIFF WBC: CPT | Performed by: NURSE PRACTITIONER

## 2022-05-28 PROCEDURE — 21400001 HC TELEMETRY ROOM

## 2022-05-28 PROCEDURE — 83735 ASSAY OF MAGNESIUM: CPT | Performed by: NURSE PRACTITIONER

## 2022-05-28 PROCEDURE — 25000003 PHARM REV CODE 250: Performed by: INTERNAL MEDICINE

## 2022-05-28 PROCEDURE — 25000003 PHARM REV CODE 250: Performed by: NURSE PRACTITIONER

## 2022-05-28 PROCEDURE — 80048 BASIC METABOLIC PNL TOTAL CA: CPT | Performed by: NURSE PRACTITIONER

## 2022-05-28 PROCEDURE — U0002 COVID-19 LAB TEST NON-CDC: HCPCS | Performed by: NURSE PRACTITIONER

## 2022-05-28 RX ADMIN — Medication 10 ML: at 09:05

## 2022-05-28 RX ADMIN — POLYETHYLENE GLYCOL 3350 17 G: 17 POWDER, FOR SOLUTION ORAL at 09:05

## 2022-05-28 RX ADMIN — ATORVASTATIN CALCIUM 40 MG: 40 TABLET, FILM COATED ORAL at 09:05

## 2022-05-28 RX ADMIN — CARVEDILOL 3.12 MG: 3.12 TABLET, FILM COATED ORAL at 09:05

## 2022-05-28 RX ADMIN — DOXYCYCLINE HYCLATE 100 MG: 100 TABLET, COATED ORAL at 08:05

## 2022-05-28 RX ADMIN — Medication 10 ML: at 06:05

## 2022-05-28 RX ADMIN — ASPIRIN 81 MG CHEWABLE TABLET 81 MG: 81 TABLET CHEWABLE at 08:05

## 2022-05-28 RX ADMIN — FAMOTIDINE 20 MG: 20 TABLET ORAL at 08:05

## 2022-05-28 RX ADMIN — DOXYCYCLINE HYCLATE 100 MG: 100 TABLET, COATED ORAL at 09:05

## 2022-05-28 RX ADMIN — FUROSEMIDE 20 MG: 20 TABLET ORAL at 08:05

## 2022-05-28 RX ADMIN — AMOXICILLIN AND CLAVULANATE POTASSIUM 1 TABLET: 875; 125 TABLET, FILM COATED ORAL at 09:05

## 2022-05-28 RX ADMIN — FAMOTIDINE 20 MG: 20 TABLET ORAL at 09:05

## 2022-05-28 RX ADMIN — POLYETHYLENE GLYCOL 3350 17 G: 17 POWDER, FOR SOLUTION ORAL at 08:05

## 2022-05-28 RX ADMIN — CHLORHEXIDINE GLUCONATE 0.12% ORAL RINSE 15 ML: 1.2 LIQUID ORAL at 08:05

## 2022-05-28 RX ADMIN — CHLORHEXIDINE GLUCONATE 0.12% ORAL RINSE 15 ML: 1.2 LIQUID ORAL at 09:05

## 2022-05-28 RX ADMIN — CARVEDILOL 3.12 MG: 3.12 TABLET, FILM COATED ORAL at 08:05

## 2022-05-28 RX ADMIN — RISPERIDONE 0.5 MG: 0.5 TABLET ORAL at 08:05

## 2022-05-28 RX ADMIN — SACUBITRIL AND VALSARTAN 1 TABLET: 24; 26 TABLET, FILM COATED ORAL at 09:05

## 2022-05-28 RX ADMIN — AMOXICILLIN AND CLAVULANATE POTASSIUM 1 TABLET: 875; 125 TABLET, FILM COATED ORAL at 08:05

## 2022-05-28 RX ADMIN — Medication 10 ML: at 03:05

## 2022-05-28 RX ADMIN — RISPERIDONE 0.5 MG: 0.5 TABLET ORAL at 09:05

## 2022-05-28 NOTE — ASSESSMENT & PLAN NOTE
Patient is identified as having Combined Systolic and Diastolic heart failure that is Acute. CHF is currently controlled. Latest ECHO performed and demonstrates- Results for orders placed during the hospital encounter of 05/22/22    Echo    Interpretation Summary  · The left ventricle is mildly enlarged with moderate concentric hypertrophy and severely decreased systolic function.  · The estimated ejection fraction is 15%.  · Grade I left ventricular diastolic dysfunction.  · There are segmental left ventricular wall motion abnormalities.  · Normal right ventricular size with moderately reduced right ventricular systolic function.  · Mild tricuspid regurgitation.  · Mechanically ventilated; cannot use inferior caval vein diameter to estimate central venous pressure.  · The aortic root is mildly dilated.  . Continue Beta Blocker and ARNI and monitor clinical status closely. Monitor on telemetry. Patient is off CHF pathway.  Monitor strict Is&Os and daily weights.  Place on fluid restriction of 1.5 L. Continue to stress to patient importance of self efficacy and  on diet for CHF. Last BNP reviewed- and noted below   Recent Labs   Lab 05/25/22  0605   *   .  5/25-   Troponin trended down to 0.068.   Denies chest pain or SOB.   Cards suggested to continue OMT for now and ischemic workup once patient is stable mentally.   Pt deemed not a good candidate for Life vest.  Continue ASA, Statin, BB, ARNI    Appears Compensated   Continue current medical management plan

## 2022-05-28 NOTE — PLAN OF CARE
Problem: Adult Inpatient Plan of Care  Goal: Plan of Care Review  Outcome: Ongoing, Progressing  Patient had no adverse events during shift. Patient free of falls. Call light in reach. Side Rails x2. Sitter at the bedside. Protected environment maintained. Patient repositions independently. Medication administered as ordered. VSS. Chart reviewed. Heart monitor in place. Will Continue to monitor.

## 2022-05-28 NOTE — ASSESSMENT & PLAN NOTE
Patient with Hypoxic Respiratory failure which is Acute.  he is not on home oxygen. Supplemental oxygen was provided and noted-  .   Signs/symptoms of respiratory failure include- respiratory distress. Contributing diagnoses includes - Aspiration Labs and images were reviewed. Patient Has recent ABG, which has been reviewed. Will treat underlying causes and adjust management of respiratory failure as follows-   Will continue Zosyn, ventilatory support , critical care follow up   5/23-  Extubated to NC today   Wean FiO2 as tolerated   Monitor clinical course   5/24-  Satisfactory SpO2 on RA    Resolved   93% on room air

## 2022-05-28 NOTE — SUBJECTIVE & OBJECTIVE
Review of Systems   Constitutional:  Positive for activity change and appetite change. Negative for fever.   HENT:  Negative for sore throat.    Eyes:  Negative for visual disturbance.   Respiratory:  Negative for cough, chest tightness and shortness of breath.    Cardiovascular:  Negative for chest pain, palpitations and leg swelling.   Gastrointestinal:  Negative for abdominal distention, abdominal pain, constipation, diarrhea, nausea and vomiting.   Endocrine: Negative for polyuria.   Genitourinary:  Negative for decreased urine volume, dysuria, flank pain, frequency and hematuria.   Musculoskeletal:  Negative for back pain and gait problem.   Skin:  Negative for rash.   Neurological:  Negative for syncope, speech difficulty, weakness, light-headedness and headaches.   Psychiatric/Behavioral:  Positive for confusion (intermittent). Negative for hallucinations and sleep disturbance.    Objective:     Vital Signs (Most Recent):  Temp: 98.1 °F (36.7 °C) (05/28/22 1304)  Pulse: 73 (05/28/22 1304)  Resp: 18 (05/28/22 1304)  BP: 111/72 (05/28/22 1304)  SpO2: (!) 93 % (05/28/22 1304)   Vital Signs (24h Range):  Temp:  [97.7 °F (36.5 °C)-99.3 °F (37.4 °C)] 98.1 °F (36.7 °C)  Pulse:  [71-88] 73  Resp:  [16-19] 18  SpO2:  [85 %-94 %] 93 %  BP: (109-126)/(58-73) 111/72     Weight: 76.9 kg (169 lb 8.5 oz)  Body mass index is 25.78 kg/m².    Intake/Output Summary (Last 24 hours) at 5/28/2022 1455  Last data filed at 5/28/2022 1300  Gross per 24 hour   Intake 620 ml   Output --   Net 620 ml      Physical Exam  Constitutional:       General: He is not in acute distress.     Appearance: He is well-developed. He is not diaphoretic.      Comments: Pt is oriented x 3 today. Speech is clearer    HENT:      Head: Normocephalic and atraumatic.      Mouth/Throat:      Pharynx: No oropharyngeal exudate.   Eyes:      Conjunctiva/sclera: Conjunctivae normal.      Pupils: Pupils are equal, round, and reactive to light.   Neck:       Thyroid: No thyromegaly.      Vascular: No JVD.   Cardiovascular:      Rate and Rhythm: Normal rate and regular rhythm.      Heart sounds: Normal heart sounds. No murmur heard.  Pulmonary:      Effort: Pulmonary effort is normal. No respiratory distress.      Breath sounds: Normal breath sounds. No wheezing or rales.   Chest:      Chest wall: No tenderness.   Abdominal:      General: Bowel sounds are normal. There is no distension.      Palpations: Abdomen is soft.      Tenderness: There is no abdominal tenderness. There is no guarding or rebound.   Musculoskeletal:         General: Normal range of motion.      Cervical back: Normal range of motion and neck supple.   Lymphadenopathy:      Cervical: No cervical adenopathy.   Skin:     General: Skin is warm and dry.      Findings: No rash.   Neurological:      General: No focal deficit present.      Mental Status: He is oriented to person, place, and time and easily aroused.      Cranial Nerves: No cranial nerve deficit.      Sensory: No sensory deficit.      Deep Tendon Reflexes: Reflexes normal.      Comments: Intermittent confusion   Psychiatric:         Attention and Perception: Attention normal.         Mood and Affect: Mood is anxious.         Speech: Speech is tangential. Speech is not slurred.         Thought Content: Thought content does not include suicidal ideation.       Significant Labs: All pertinent labs within the past 24 hours have been reviewed.  BMP:   Recent Labs   Lab 05/28/22  0521   GLU 87      K 4.4   *   CO2 22*   BUN 23   CREATININE 0.9   CALCIUM 9.3   MG 2.0     CBC:   Recent Labs   Lab 05/27/22  0526 05/28/22  0521   WBC 8.87 9.67   HGB 12.8* 13.1*   HCT 37.0* 38.0*   * 190       Significant Imaging: I have reviewed all pertinent imaging results/findings within the past 24 hours.

## 2022-05-28 NOTE — PLAN OF CARE
POC reviewed with patient and he verbalized understanding.   Problem: Infection  Goal: Absence of Infection Signs and Symptoms  Outcome: Ongoing, Progressing     Problem: Adult Inpatient Plan of Care  Goal: Plan of Care Review  Outcome: Ongoing, Progressing  Goal: Patient-Specific Goal (Individualized)  Outcome: Ongoing, Progressing  Goal: Absence of Hospital-Acquired Illness or Injury  Outcome: Ongoing, Progressing  Goal: Optimal Comfort and Wellbeing  Outcome: Ongoing, Progressing  Goal: Readiness for Transition of Care  Outcome: Ongoing, Progressing     Problem: Fall Injury Risk  Goal: Absence of Fall and Fall-Related Injury  Outcome: Ongoing, Progressing     Problem: Fluid and Electrolyte Imbalance (Acute Kidney Injury/Impairment)  Goal: Fluid and Electrolyte Balance  Outcome: Ongoing, Progressing     Problem: Oral Intake Inadequate (Acute Kidney Injury/Impairment)  Goal: Optimal Nutrition Intake  Outcome: Ongoing, Progressing     Problem: Renal Function Impairment (Acute Kidney Injury/Impairment)  Goal: Effective Renal Function  Outcome: Ongoing, Progressing     Problem: Skin Injury Risk Increased  Goal: Skin Health and Integrity  Outcome: Ongoing, Progressing0  0 [phone]

## 2022-05-28 NOTE — PROGRESS NOTES
[facility] - Telemetry ([facility] Medicine  Progress Note    Patient Name: Tristin Saha  MRN: [id number]  Patient Class: IP- Inpatient   Admission Date: 5/22/2022  Length of Stay: 6 days  Attending Physician: Woo Farias, *  Primary Care Provider: Shari Colon MD        Subjective:     Principal Problem:Intentional drug overdose        HPI:  5/22/2022, 5:13 PM  History obtained from the [facility]      63 y/o M with PMH of bipolar, schizophrenia with prior suicide attempts here with suspected overdose. He asked the wife to stay at another house and when she got home, she found him unconscious with evidence of recent vomiting .EMS noted  noted rapid breathing, hot environment, and patient only responding to painful stimuli. He had an empty bottle of Risperdal next to him.  In the ED,  he was covered in vomit, gurgling respirations, and only localizing to pain, grunting, and not opening his eyes. He was breathing fast, in the 30's. Sats on bag valve mask were in the mid 90's. Temp was 103 ºF rectally.   Intubated for airway protection.   Lab and imaging test reviewed.  Component      Latest Ref Rng & Units 5/23/2022 5/22/2022   POC PH      7.35 - 7.45 7.494 (H) 7.278 (LL)   POC PCO2      35 - 45 mmHg 28.9 (LL) 43.9   POC PO2      80 - 100 mmHg 111 (H) 223 (H)     CT head with no acute findings. XR chest with possible aspiration.  ED work up -  He was acidotic, QRS was >100, we started bicarb at 250 cc/hr, and gave fluid bolus. Spoke with poison control, no recommendations for dantrolene as he was not having rigitity.   He will be admitted to the ICU.        Overview/Hospital Course:  Admitted to ICU overnight on mechanical ventilation for further management of intentional drug overdose. Empty bottle of Risperdal was found at home. QTc 428 on EKG. CK 1138>1123, troponin 0.050>0.184. Lactic acid normalized 1.9>4.4>3.9. Currently requiring minimal vent support.     5/23-Extubated today after successful  SAT/SBT. Somnolent , but awaken easily, oriented to self and person. Pt verbalized taking 8 pills of Risperdal intentionally. Will consult Tele-Psych today.  WBC 14K, Hgb 12.5, Plt 151. Troponin bumped to 0.184. Will get an echocardiogram. Continue ASA, empiric  antibiotic targeting aspiration pneumonia. Creatinine improved to normal 1.0>1.6. Metabolic acidosis resolved.     5/24- Awake . Appears confused , intermittently oriented to self and place , speech is tangential, slurred  and unintelligible. Will resume home medicine Buspar and low dose risperidone. Echo suggested LVEF 15%, G1DD, segmental ventricular WMA. Cardiology consulted . Pt started on ASA, statin, BB and ARNI and will need ischemic workup and possibly Life Vest. Troponin 0.117 this morning. Awaiting Tele-Psych consult . Downgrade to [employer].     5/25- Pt appears calmer , speech is much clearer today. Oriented x 3. New onset fever overnight . Tmax 101.3. Repeat CXR showed consolidation in the left mid lower lung with small effusion consistent with pneumonia. Antibiotic include Augmentin and Doxycycline inititaed . SpO2 93% on RA. WBC 11.1K, Plt 132, Cr 1.0. PCT 7.52.  Troponin trended down to 0.068. Denies chest pain or SOB. Cards suggested to continue OMT for now and ischemic workup once patient is stable mentally. Pt deemed not a good candidate for Life vest. Tele Psych consult obtained. Buspar discontinued as recommended by Tele Psych. Risperidone continues with PRN Zyprexa. Disposition - In-patient Psych placement once pt is fever free x 48h. Currently stable from Cardiac stand point. Pt is CEDed.     05/26: patient afebrile overnight - continue PO amoxicillin and likely stable to dc to PSYCH facility tomorrow am.  5/27/22: Labs reviewed and stable. Pt medically cleared.   5/28/22: Rapid COVID screening positive, pt transferred to telemetry unit. Patient asymptomatic, O2 sats 93% on room air. Awaiting placement.             Review of Systems    Constitutional:  Positive for activity change and appetite change. Negative for fever.   HENT:  Negative for sore throat.    Eyes:  Negative for visual disturbance.   Respiratory:  Negative for cough, chest tightness and shortness of breath.    Cardiovascular:  Negative for chest pain, palpitations and leg swelling.   Gastrointestinal:  Negative for abdominal distention, abdominal pain, constipation, diarrhea, nausea and vomiting.   Endocrine: Negative for polyuria.   Genitourinary:  Negative for decreased urine volume, dysuria, flank pain, frequency and hematuria.   Musculoskeletal:  Negative for back pain and gait problem.   Skin:  Negative for rash.   Neurological:  Negative for syncope, speech difficulty, weakness, light-headedness and headaches.   Psychiatric/Behavioral:  Positive for confusion (intermittent). Negative for hallucinations and sleep disturbance.    Objective:     Vital Signs (Most Recent):  Temp: 98.1 °F (36.7 °C) (05/28/22 1304)  Pulse: 73 (05/28/22 1304)  Resp: 18 (05/28/22 1304)  BP: 111/72 (05/28/22 1304)  SpO2: (!) 93 % (05/28/22 1304)   Vital Signs (24h Range):  Temp:  [97.7 °F (36.5 °C)-99.3 °F (37.4 °C)] 98.1 °F (36.7 °C)  Pulse:  [71-88] 73  Resp:  [16-19] 18  SpO2:  [85 %-94 %] 93 %  BP: (109-126)/(58-73) 111/72     Weight: 76.9 kg (169 lb 8.5 oz)  Body mass index is 25.78 kg/m².    Intake/Output Summary (Last 24 hours) at 5/28/2022 1455  Last data filed at 5/28/2022 1300  Gross per 24 hour   Intake 620 ml   Output --   Net 620 ml      Physical Exam  Constitutional:       General: He is not in acute distress.     Appearance: He is well-developed. He is not diaphoretic.      Comments: Pt is oriented x 3 today. Speech is clearer    HENT:      Head: Normocephalic and atraumatic.      Mouth/Throat:      Pharynx: No oropharyngeal exudate.   Eyes:      Conjunctiva/sclera: Conjunctivae normal.      Pupils: Pupils are equal, round, and reactive to light.   Neck:      Thyroid: No thyromegaly.       Vascular: No JVD.   Cardiovascular:      Rate and Rhythm: Normal rate and regular rhythm.      Heart sounds: Normal heart sounds. No murmur heard.  Pulmonary:      Effort: Pulmonary effort is normal. No respiratory distress.      Breath sounds: Normal breath sounds. No wheezing or rales.   Chest:      Chest wall: No tenderness.   Abdominal:      General: Bowel sounds are normal. There is no distension.      Palpations: Abdomen is soft.      Tenderness: There is no abdominal tenderness. There is no guarding or rebound.   Musculoskeletal:         General: Normal range of motion.      Cervical back: Normal range of motion and neck supple.   Lymphadenopathy:      Cervical: No cervical adenopathy.   Skin:     General: Skin is warm and dry.      Findings: No rash.   Neurological:      General: No focal deficit present.      Mental Status: He is oriented to person, place, and time and easily aroused.      Cranial Nerves: No cranial nerve deficit.      Sensory: No sensory deficit.      Deep Tendon Reflexes: Reflexes normal.      Comments: Intermittent confusion   Psychiatric:         Attention and Perception: Attention normal.         Mood and Affect: Mood is anxious.         Speech: Speech is tangential. Speech is not slurred.         Thought Content: Thought content does not include suicidal ideation.       Significant Labs: All pertinent labs within the past 24 hours have been reviewed.  BMP:   Recent Labs   Lab 05/28/22  0521   GLU 87      K 4.4   *   CO2 22*   BUN 23   CREATININE 0.9   CALCIUM 9.3   MG 2.0     CBC:   Recent Labs   Lab 05/27/22  0526 05/28/22  0521   WBC 8.87 9.67   HGB 12.8* 13.1*   HCT 37.0* 38.0*   * 190       Significant Imaging: I have reviewed all pertinent imaging results/findings within the past 24 hours.      Assessment/Plan:      * Intentional drug overdose  ED discussed with poison control, no recommendations for dantrolene as he was not having rigitity.   He is  intubated for airway protection  .  Started on bicarbonate drip .  EKG showed QTc 428  5/23-  Extubated today   Pt verbalized taking 8 pills of Risperdal intentionally   Sitter , suicide precaution   Tele -Psych consult    5/25-   Tele Psych consult obtained   PEC/CEC continues   Transfer to In-patient psych facility once medically cleared     Pt medically cleared awaiting psych placement     COVID-19  Pt asymptomatic   O2 sats 93% on room air   No further treatment at this time       Encephalopathy, toxic/ [place]   - Likely related to drug overdose   -Monitor clinical course     Resolved    Acute combined systolic and diastolic congestive heart failure/ Cardiomyopathy - Ischemic vs Drug induced   Patient is identified as having Combined Systolic and Diastolic heart failure that is Acute. CHF is currently controlled. Latest ECHO performed and demonstrates- Results for orders placed during the hospital encounter of 05/22/22    Echo    Interpretation Summary  · The left ventricle is mildly enlarged with moderate concentric hypertrophy and severely decreased systolic function.  · The estimated ejection fraction is 15%.  · Grade I left ventricular diastolic dysfunction.  · There are segmental left ventricular wall motion abnormalities.  · Normal right ventricular size with moderately reduced right ventricular systolic function.  · Mild tricuspid regurgitation.  · Mechanically ventilated; cannot use inferior caval vein diameter to estimate central venous pressure.  · The aortic root is mildly dilated.  . Continue Beta Blocker and ARNI and monitor clinical status closely. Monitor on telemetry. Patient is off CHF pathway.  Monitor strict Is&Os and daily weights.  Place on fluid restriction of 1.5 L. Continue to stress to patient importance of self efficacy and  on diet for CHF. Last BNP reviewed- and noted below   Recent Labs   Lab 05/25/22  0605   *   .  5/25-   Troponin trended down to 0.068.   Denies  chest pain or SOB.   Cards suggested to continue OMT for now and ischemic workup once patient is stable mentally.   Pt deemed not a good candidate for Life vest.  Continue ASA, Statin, BB, ARNI    Appears Compensated   Continue current medical management plan       Aspiration pneumonia/ Pneumonitis   Remains afebrile   Continue Augmentin and doxycycline     Acute hypoxemic respiratory failure  Patient with Hypoxic Respiratory failure which is Acute.  he is not on home oxygen. Supplemental oxygen was provided and noted-  .   Signs/symptoms of respiratory failure include- respiratory distress. Contributing diagnoses includes - Aspiration Labs and images were reviewed. Patient Has recent ABG, which has been reviewed. Will treat underlying causes and adjust management of respiratory failure as follows-   Will continue Zosyn, ventilatory support , critical care follow up   5/23-  Extubated to NC today   Wean FiO2 as tolerated   Monitor clinical course   5/24-  Satisfactory SpO2 on RA    Resolved   93% on room air     Schizoaffective disorder, bipolar type  Tele -Psych consult   Pt will need inpatient psychiatry evaluation   5/24-  Resume home meds - Buspar and low dose Risperidone   5/25-  Tele-Psych consult obtained   Buspar discontinued per recs  Continue Risperidone and PRN Zyprexa     Continue current management       VTE Risk Mitigation (From admission, onward)         Ordered     enoxaparin injection 40 mg  Daily         05/22/22 2315     IP VTE LOW RISK PATIENT  Once         05/22/22 2149     Place sequential compression device  Until discontinued         05/22/22 2149                Discharge Planning   DADA:      Code Status: Full Code   Is the patient medically ready for discharge?:     Reason for patient still in hospital (select all that apply): Patient trending condition, Laboratory test and Pending disposition  Discharge Plan A: Psychiatric hospital   Discharge Delays: None known at this  time              Meka Valdovinos NP  Department of Hospital Medicine   [facility] - Telemetry ([facility])

## 2022-05-29 DIAGNOSIS — U07.1 COVID-19 VIRUS DETECTED: ICD-10-CM

## 2022-05-29 NOTE — NURSING
Patient discharged to [facility], report given to Nurse Zeng, Picc and PIV removed upon discharge.

## 2022-05-31 NOTE — PLAN OF CARE
[facility] - Telemetry (Hospital)  Discharge Final Note    Primary Care Provider: Shari Colon MD    Expected Discharge Date: 5/29/2022    Final Discharge Note (most recent)     Final Note - 05/31/22 0843        Final Note    Assessment Type Final Discharge Note     Anticipated Discharge Disposition Psychiatric Hospital        Post-Acute Status    Discharge Delays None known at this time                 Important Message from Medicare

## 2022-05-31 NOTE — DISCHARGE SUMMARY
[facility] Tele[facility] ([facility])  [facility] Medicine  Discharge Summary      Patient Name: Tristin Saha  MRN: [id number]  Patient Class: IP- Inpatient  Admission Date: 5/22/2022  Hospital Length of Stay: 6 days  Discharge Date and Time:  05/28/2022 11:30 PM  Attending Physician: No att. providers found   Discharging Provider: Meka Valdovinos NP  Primary Care Provider: Shari Colon MD      HPI:   5/22/2022, 5:13 PM  History obtained from the [facility]      63 y/o M with PMH of bipolar, schizophrenia with prior suicide attempts here with suspected overdose. He asked the wife to stay at another house and when she got home, she found him unconscious with evidence of recent vomiting .EMS noted  noted rapid breathing, hot environment, and patient only responding to painful stimuli. He had an empty bottle of Risperdal next to him.  In the ED,  he was covered in vomit, gurgling respirations, and only localizing to pain, grunting, and not opening his eyes. He was breathing fast, in the 30's. Sats on bag valve mask were in the mid 90's. Temp was 103 ºF rectally.   Intubated for airway protection.   Lab and imaging test reviewed.  Component      Latest Ref Rng & Units 5/23/2022 5/22/2022   POC PH      7.35 - 7.45 7.494 (H) 7.278 (LL)   POC PCO2      35 - 45 mmHg 28.9 (LL) 43.9   POC PO2      80 - 100 mmHg 111 (H) 223 (H)     CT head with no acute findings. XR chest with possible aspiration.  ED work up -  He was acidotic, QRS was >100, we started bicarb at 250 cc/hr, and gave fluid bolus. Spoke with poison control, no recommendations for dantrolene as he was not having rigitity.   He will be admitted to the ICU.        * No surgery found *      Hospital Course:   Admitted to ICU overnight on mechanical ventilation for further management of intentional drug overdose. Empty bottle of Risperdal was found at home. QTc 428 on EKG. CK 1138>1123, troponin 0.050>0.184. Lactic acid normalized 1.9>4.4>3.9. Currently requiring minimal  vent support.     5/23-Extubated today after successful SAT/SBT. Somnolent , but awaken easily, oriented to self and person. Pt verbalized taking 8 pills of Risperdal intentionally. Will consult Tele-Psych today.  WBC 14K, Hgb 12.5, Plt 151. Troponin bumped to 0.184. Will get an echocardiogram. Continue ASA, empiric  antibiotic targeting aspiration pneumonia. Creatinine improved to normal 1.0>1.6. Metabolic acidosis resolved.     5/24- Awake . Appears confused , intermittently oriented to self and place , speech is tangential, slurred  and unintelligible. Will resume home medicine Buspar and low dose risperidone. Echo suggested LVEF 15%, G1DD, segmental ventricular WMA. Cardiology consulted . Pt started on ASA, statin, BB and ARNI and will need ischemic workup and possibly Life Vest. Troponin 0.117 this morning. Awaiting Tele-Psych consult . Downgrade to Med-Tele.     5/25- Pt appears calmer , speech is much clearer today. Oriented x 3. New onset fever overnight . Tmax 101.3. Repeat CXR showed consolidation in the left mid lower lung with small effusion consistent with pneumonia. Antibiotic include Augmentin and Doxycycline inititaed . SpO2 93% on RA. WBC 11.1K, Plt 132, Cr 1.0. PCT 7.52.  Troponin trended down to 0.068. Denies chest pain or SOB. Cards suggested to continue OMT for now and ischemic workup once patient is stable mentally. Pt deemed not a good candidate for Life vest. Tele Psych consult obtained. Buspar discontinued as recommended by Tele Psych. Risperidone continues with PRN Zyprexa. Disposition - In-patient Psych placement once pt is fever free x 48h. Currently stable from Cardiac stand point. Pt is CEDed.     05/26: patient afebrile overnight - continue PO amoxicillin and likely stable to dc to PSYCH facility tomorrow am.  5/27/22: Labs reviewed and stable. Pt medically cleared.   5/28/22: Rapid COVID screening positive, pt transferred to telemetry unit. Patient asymptomatic, O2 sats 93% on room  air. Awaiting placement.   5/28/22:Overnight pt discharge to [facility].          Goals of Care Treatment Preferences:  Code Status: Full Code      Consults:   Consults (From admission, onward)        Status Ordering Provider     Inpatient consult to Social Work  Once        Provider:  (Not yet assigned)    Completed PAULA RUIZ     Inpatient consult to Cardiology  Once        Provider:  John Yen MD    Completed PAULA RUIZ     Inpatient consult to Pulmonology  Once        Provider:  (Not yet assigned)    Completed PAULA RUIZ     Inpatient consult to Registered Dietitian/Nutritionist  Once        Provider:  (Not yet assigned)    Completed SILVERIO TAYLOR     Inpatient consult to Registered Dietitian/Nutritionist  Once        Provider:  (Not yet assigned)    Completed MIKAYLA WEBB          No new Assessment & Plan notes have been filed under this hospital service since the last note was generated.  Service: Hospital Medicine    Final Active Diagnoses:    Diagnosis Date Noted POA    PRINCIPAL PROBLEM:  Intentional drug overdose [T50.902A] 05/23/2022 Yes    COVID-19 [U07.1] 05/28/2022 No    Encephalopathy, toxic/ [place]  [G92.9] 05/24/2022 Yes    Acute hypoxemic respiratory failure [J96.01] 05/23/2022 Yes    Aspiration pneumonia/ Pneumonitis  [J69.0] 05/23/2022 Yes    Acute combined systolic and diastolic congestive heart failure/ Cardiomyopathy - Ischemic vs Drug induced  [I50.41] 05/23/2022 Yes    Schizoaffective disorder, bipolar type [F25.0] 02/25/2017 Yes     Chronic      Problems Resolved During this Admission:    Diagnosis Date Noted Date Resolved POA    Cardiomyopathy [I42.9] 05/24/2022 05/24/2022 Yes    ALFONZO (acute kidney injury) [N17.9] 05/23/2022 05/24/2022 Yes    Hypokalemia [E87.6] 05/23/2022 05/24/2022 Yes    On mechanically assisted ventilation, S/P Extubation 5/23/22 [Z99.11] 05/23/2022 05/27/2022 Not Applicable       Discharged Condition:  stable    Disposition: Psychiatric Hospital    Follow Up:    Patient Instructions:   No discharge procedures on file.    Significant Diagnostic Studies: Labs: BMP: No results for input(s): GLU, NA, K, CL, CO2, BUN, CREATININE, CALCIUM, MG in the last 48 hours., CMP No results for input(s): NA, K, CL, CO2, GLU, BUN, CREATININE, CALCIUM, PROT, ALBUMIN, BILITOT, ALKPHOS, AST, ALT, ANIONGAP, ESTGFRAFRICA, EGFRNONAA in the last 48 hours. and CBC No results for input(s): WBC, HGB, HCT, PLT in the last 48 hours.    Pending Diagnostic Studies:     None         Medications:  Reconciled Home Medications:      Medication List      ASK your doctor about these medications    busPIRone 15 MG tablet  Commonly known as: BUSPAR  Take 1 tablet (15 mg total) by mouth 2 (two) times daily.     diclofenac sodium 1 % Gel  Commonly known as: VOLTAREN  Apply topically 3 (three) times daily as needed (neck/back/arm pain). 10 g tube ok     gabapentin 300 MG capsule  Commonly known as: NEURONTIN  Take 2 capsules (600 mg total) by mouth 3 (three) times daily.     lithium 300 MG CR tablet  Commonly known as: LITHOBID  Take 2 tablets (600 mg total) by mouth every 12 (twelve) hours.     nicotine 21 mg/24 hr  Commonly known as: NICODERM CQ  Place 1 patch onto the skin once daily.     risperiDONE 4 MG tablet  Commonly known as: RISPERDAL  Take 1 tablet (4 mg total) by mouth every evening.            Indwelling Lines/Drains at time of discharge:   Lines/Drains/Airways     Peripherally Inserted Central Catheter Line  Duration           PICC Double Lumen 05/23/22 0425 left basilic 8 days                Time spent on the discharge of patient: 20 minutes         Meka Valdovinos NP  Department of Hospital Medicine  [facility] ([facility]

## 2022-06-06 ENCOUNTER — TELEPHONE (OUTPATIENT)
Dept: CARDIOLOGY | Facility: HOSPITAL | Age: 62
End: 2022-06-06
Payer: COMMERCIAL

## 2023-09-08 ENCOUNTER — HOSPITAL ENCOUNTER (EMERGENCY)
Facility: HOSPITAL | Age: 63
Discharge: OTHER INSTITUTION | End: 2023-09-09
Attending: EMERGENCY MEDICINE
Payer: MEDICARE

## 2023-09-08 VITALS
OXYGEN SATURATION: 98 % | SYSTOLIC BLOOD PRESSURE: 129 MMHG | RESPIRATION RATE: 16 BRPM | TEMPERATURE: 99 F | DIASTOLIC BLOOD PRESSURE: 88 MMHG | HEART RATE: 72 BPM

## 2023-09-08 DIAGNOSIS — F22 PARANOID DELUSION: ICD-10-CM

## 2023-09-08 DIAGNOSIS — F25.8 OTHER SCHIZOAFFECTIVE DISORDERS: Primary | ICD-10-CM

## 2023-09-08 DIAGNOSIS — Z00.8 MEDICAL CLEARANCE FOR PSYCHIATRIC ADMISSION: ICD-10-CM

## 2023-09-08 LAB
ALBUMIN SERPL BCP-MCNC: 4 G/DL (ref 3.5–5.2)
ALP SERPL-CCNC: 61 U/L (ref 55–135)
ALT SERPL W/O P-5'-P-CCNC: 9 U/L (ref 10–44)
AMPHET+METHAMPHET UR QL: NEGATIVE
ANION GAP SERPL CALC-SCNC: 11 MMOL/L (ref 8–16)
AST SERPL-CCNC: 18 U/L (ref 10–40)
BARBITURATES UR QL SCN>200 NG/ML: NEGATIVE
BASOPHILS # BLD AUTO: 0.05 K/UL (ref 0–0.2)
BASOPHILS NFR BLD: 0.6 % (ref 0–1.9)
BENZODIAZ UR QL SCN>200 NG/ML: NEGATIVE
BILIRUB SERPL-MCNC: 0.6 MG/DL (ref 0.1–1)
BILIRUB UR QL STRIP: NEGATIVE
BUN SERPL-MCNC: 18 MG/DL (ref 8–23)
BZE UR QL SCN: NEGATIVE
CALCIUM SERPL-MCNC: 10.2 MG/DL (ref 8.7–10.5)
CANNABINOIDS UR QL SCN: NEGATIVE
CHLORIDE SERPL-SCNC: 113 MMOL/L (ref 95–110)
CLARITY UR: CLEAR
CO2 SERPL-SCNC: 18 MMOL/L (ref 23–29)
COLOR UR: COLORLESS
CREAT SERPL-MCNC: 1.2 MG/DL (ref 0.5–1.4)
CREAT UR-MCNC: 51 MG/DL (ref 23–375)
DIFFERENTIAL METHOD: ABNORMAL
EOSINOPHIL # BLD AUTO: 0.1 K/UL (ref 0–0.5)
EOSINOPHIL NFR BLD: 1.1 % (ref 0–8)
ERYTHROCYTE [DISTWIDTH] IN BLOOD BY AUTOMATED COUNT: 13.9 % (ref 11.5–14.5)
EST. GFR  (NO RACE VARIABLE): >60 ML/MIN/1.73 M^2
ETHANOL SERPL-MCNC: <10 MG/DL
GLUCOSE SERPL-MCNC: 103 MG/DL (ref 70–110)
GLUCOSE UR QL STRIP: NEGATIVE
HCT VFR BLD AUTO: 45.6 % (ref 40–54)
HGB BLD-MCNC: 15.5 G/DL (ref 14–18)
HGB UR QL STRIP: NEGATIVE
IMM GRANULOCYTES # BLD AUTO: 0.03 K/UL (ref 0–0.04)
IMM GRANULOCYTES NFR BLD AUTO: 0.3 % (ref 0–0.5)
KETONES UR QL STRIP: NEGATIVE
LEUKOCYTE ESTERASE UR QL STRIP: NEGATIVE
LYMPHOCYTES # BLD AUTO: 1.9 K/UL (ref 1–4.8)
LYMPHOCYTES NFR BLD: 21.7 % (ref 18–48)
MCH RBC QN AUTO: 30.5 PG (ref 27–31)
MCHC RBC AUTO-ENTMCNC: 34 G/DL (ref 32–36)
MCV RBC AUTO: 90 FL (ref 82–98)
METHADONE UR QL SCN>300 NG/ML: NEGATIVE
MONOCYTES # BLD AUTO: 0.7 K/UL (ref 0.3–1)
MONOCYTES NFR BLD: 7.6 % (ref 4–15)
NEUTROPHILS # BLD AUTO: 6 K/UL (ref 1.8–7.7)
NEUTROPHILS NFR BLD: 68.7 % (ref 38–73)
NITRITE UR QL STRIP: NEGATIVE
NRBC BLD-RTO: 0 /100 WBC
OPIATES UR QL SCN: NEGATIVE
PCP UR QL SCN>25 NG/ML: NEGATIVE
PH UR STRIP: 7 [PH] (ref 5–8)
PLATELET # BLD AUTO: 179 K/UL (ref 150–450)
PMV BLD AUTO: 8.7 FL (ref 9.2–12.9)
POTASSIUM SERPL-SCNC: 4 MMOL/L (ref 3.5–5.1)
PROT SERPL-MCNC: 7.5 G/DL (ref 6–8.4)
PROT UR QL STRIP: NEGATIVE
RBC # BLD AUTO: 5.08 M/UL (ref 4.6–6.2)
SODIUM SERPL-SCNC: 142 MMOL/L (ref 136–145)
SP GR UR STRIP: 1.01 (ref 1–1.03)
TOXICOLOGY INFORMATION: NORMAL
TSH SERPL DL<=0.005 MIU/L-ACNC: 1.7 UIU/ML (ref 0.4–4)
URN SPEC COLLECT METH UR: ABNORMAL
UROBILINOGEN UR STRIP-ACNC: NEGATIVE EU/DL
WBC # BLD AUTO: 8.77 K/UL (ref 3.9–12.7)

## 2023-09-08 PROCEDURE — 99285 EMERGENCY DEPT VISIT HI MDM: CPT

## 2023-09-08 PROCEDURE — 82077 ASSAY SPEC XCP UR&BREATH IA: CPT | Performed by: EMERGENCY MEDICINE

## 2023-09-08 PROCEDURE — 84443 ASSAY THYROID STIM HORMONE: CPT | Performed by: EMERGENCY MEDICINE

## 2023-09-08 PROCEDURE — 81003 URINALYSIS AUTO W/O SCOPE: CPT | Mod: 59 | Performed by: EMERGENCY MEDICINE

## 2023-09-08 PROCEDURE — 85025 COMPLETE CBC W/AUTO DIFF WBC: CPT | Performed by: EMERGENCY MEDICINE

## 2023-09-08 PROCEDURE — 80053 COMPREHEN METABOLIC PANEL: CPT | Performed by: EMERGENCY MEDICINE

## 2023-09-08 PROCEDURE — 80307 DRUG TEST PRSMV CHEM ANLYZR: CPT | Performed by: EMERGENCY MEDICINE

## 2023-09-09 ENCOUNTER — HOSPITAL ENCOUNTER (INPATIENT)
Facility: HOSPITAL | Age: 63
LOS: 11 days | Discharge: HOME | DRG: 885 | End: 2023-09-20
Attending: PSYCHIATRY & NEUROLOGY | Admitting: PSYCHIATRY & NEUROLOGY
Payer: COMMERCIAL

## 2023-09-09 DIAGNOSIS — F23 ACUTE PSYCHOSIS: ICD-10-CM

## 2023-09-09 PROBLEM — U07.1 COVID-19: Status: RESOLVED | Noted: 2022-05-28 | Resolved: 2023-09-09

## 2023-09-09 PROBLEM — J96.01 ACUTE HYPOXEMIC RESPIRATORY FAILURE: Status: RESOLVED | Noted: 2022-05-23 | Resolved: 2023-09-09

## 2023-09-09 PROBLEM — J69.0 ASPIRATION PNEUMONIA: Status: RESOLVED | Noted: 2022-05-23 | Resolved: 2023-09-09

## 2023-09-09 PROBLEM — G92.9 ENCEPHALOPATHY, TOXIC: Status: RESOLVED | Noted: 2022-05-24 | Resolved: 2023-09-09

## 2023-09-09 LAB
CHOLEST SERPL-MCNC: 197 MG/DL (ref 120–199)
CHOLEST/HDLC SERPL: 4.8 {RATIO} (ref 2–5)
ESTIMATED AVG GLUCOSE: 103 MG/DL (ref 68–131)
HBA1C MFR BLD: 5.2 % (ref 4–5.6)
HDLC SERPL-MCNC: 41 MG/DL (ref 40–75)
HDLC SERPL: 20.8 % (ref 20–50)
LDLC SERPL CALC-MCNC: 120 MG/DL (ref 63–159)
NONHDLC SERPL-MCNC: 156 MG/DL
TRIGL SERPL-MCNC: 180 MG/DL (ref 30–150)

## 2023-09-09 PROCEDURE — 11400000 HC PSYCH PRIVATE ROOM

## 2023-09-09 PROCEDURE — 99223 1ST HOSP IP/OBS HIGH 75: CPT | Mod: ,,, | Performed by: STUDENT IN AN ORGANIZED HEALTH CARE EDUCATION/TRAINING PROGRAM

## 2023-09-09 PROCEDURE — 80061 LIPID PANEL: CPT | Performed by: PSYCHIATRY & NEUROLOGY

## 2023-09-09 PROCEDURE — 36415 COLL VENOUS BLD VENIPUNCTURE: CPT | Performed by: PSYCHIATRY & NEUROLOGY

## 2023-09-09 PROCEDURE — 83036 HEMOGLOBIN GLYCOSYLATED A1C: CPT | Performed by: PSYCHIATRY & NEUROLOGY

## 2023-09-09 PROCEDURE — 99223 PR INITIAL HOSPITAL CARE,LEVL III: ICD-10-PCS | Mod: ,,, | Performed by: STUDENT IN AN ORGANIZED HEALTH CARE EDUCATION/TRAINING PROGRAM

## 2023-09-09 RX ORDER — LOPERAMIDE HYDROCHLORIDE 2 MG/1
2 CAPSULE ORAL 4 TIMES DAILY PRN
Status: DISCONTINUED | OUTPATIENT
Start: 2023-09-09 | End: 2023-09-20 | Stop reason: HOSPADM

## 2023-09-09 RX ORDER — DOCUSATE SODIUM 100 MG/1
100 CAPSULE, LIQUID FILLED ORAL DAILY PRN
Status: DISCONTINUED | OUTPATIENT
Start: 2023-09-09 | End: 2023-09-20 | Stop reason: HOSPADM

## 2023-09-09 RX ORDER — ACETAMINOPHEN 325 MG/1
650 TABLET ORAL EVERY 6 HOURS PRN
Status: DISCONTINUED | OUTPATIENT
Start: 2023-09-09 | End: 2023-09-20 | Stop reason: HOSPADM

## 2023-09-09 RX ORDER — MAG HYDROX/ALUMINUM HYD/SIMETH 200-200-20
30 SUSPENSION, ORAL (FINAL DOSE FORM) ORAL EVERY 6 HOURS PRN
Status: DISCONTINUED | OUTPATIENT
Start: 2023-09-09 | End: 2023-09-20 | Stop reason: HOSPADM

## 2023-09-09 RX ORDER — MUPIROCIN 20 MG/G
OINTMENT TOPICAL 2 TIMES DAILY
Status: DISPENSED | OUTPATIENT
Start: 2023-09-09 | End: 2023-09-14

## 2023-09-09 RX ORDER — OLANZAPINE 10 MG/1
10 TABLET ORAL EVERY 8 HOURS PRN
Status: DISCONTINUED | OUTPATIENT
Start: 2023-09-09 | End: 2023-09-20 | Stop reason: HOSPADM

## 2023-09-09 RX ORDER — OLANZAPINE 5 MG/1
5 TABLET, ORALLY DISINTEGRATING ORAL 2 TIMES DAILY
Status: DISCONTINUED | OUTPATIENT
Start: 2023-09-09 | End: 2023-09-10

## 2023-09-09 RX ORDER — OLANZAPINE 10 MG/2ML
10 INJECTION, POWDER, FOR SOLUTION INTRAMUSCULAR EVERY 8 HOURS PRN
Status: DISCONTINUED | OUTPATIENT
Start: 2023-09-09 | End: 2023-09-20 | Stop reason: HOSPADM

## 2023-09-09 RX ORDER — HYDROXYZINE PAMOATE 50 MG/1
50 CAPSULE ORAL NIGHTLY PRN
Status: DISCONTINUED | OUTPATIENT
Start: 2023-09-09 | End: 2023-09-18

## 2023-09-09 RX ORDER — IBUPROFEN 200 MG
1 TABLET ORAL DAILY PRN
Status: DISCONTINUED | OUTPATIENT
Start: 2023-09-09 | End: 2023-09-20 | Stop reason: HOSPADM

## 2023-09-09 NOTE — PLAN OF CARE
History of Present Illness: Tristin Saha is a 63 y.o. male patient with a PMHx of depression who presents to the Emergency Department from home for a psychiatric evaluation. Per EMS, the pt has been having paranoid delusions and is not taking his psychiatric medications. Pt denies SI and HI.  UDS Negative.   Patient arrived on unit alert, oriented x 4, and very talkative.  Patient very anxious about his day and concerned because of his neighbor's dog biting his grandson.  Patient expressed that his he has been off medication and he is requesting Prevagen and a safety plan for his grandson.  Very bad RENATA and and patient showered after snack.  Patient oriented to unit and went to bed.  No concerns with sleeping; however, patient will expressed that he would ne dead by Monday.

## 2023-09-09 NOTE — SUBJECTIVE & OBJECTIVE
Past Medical History:   Diagnosis Date    Depression        No past surgical history on file.    Review of patient's allergies indicates:   Allergen Reactions    Haldol [haloperidol lactate]      Shaking      Lamictal [lamotrigine]     Trileptal [oxcarbazepine]        No current facility-administered medications on file prior to encounter.     Current Outpatient Medications on File Prior to Encounter   Medication Sig    busPIRone (BUSPAR) 15 MG tablet Take 1 tablet (15 mg total) by mouth 2 (two) times daily.    diclofenac sodium 1 % Gel Apply topically 3 (three) times daily as needed (neck/back/arm pain). 10 g tube ok    gabapentin (NEURONTIN) 300 MG capsule Take 2 capsules (600 mg total) by mouth 3 (three) times daily.    lithium (LITHOBID) 300 MG CR tablet Take 2 tablets (600 mg total) by mouth every 12 (twelve) hours.    nicotine (NICODERM CQ) 21 mg/24 hr Place 1 patch onto the skin once daily.    risperidone (RISPERDAL) 4 MG tablet Take 1 tablet (4 mg total) by mouth every evening.     Family History    None       Tobacco Use    Smoking status: Some Days    Smokeless tobacco: Not on file   Substance and Sexual Activity    Alcohol use: Not on file    Drug use: Not on file    Sexual activity: Not on file     Review of Systems   Unable to perform ROS: Psychiatric disorder     Objective:     Vital Signs (Most Recent):  Temp: 97.7 °F (36.5 °C) (09/09/23 0822)  Pulse: (!) 52 (09/09/23 0822)  Resp: 18 (09/09/23 0822)  BP: 111/63 (09/09/23 0822)  SpO2: 97 % (09/09/23 0822) Vital Signs (24h Range):  Temp:  [97.7 °F (36.5 °C)-99.1 °F (37.3 °C)] 97.7 °F (36.5 °C)  Pulse:  [52-72] 52  Resp:  [16-18] 18  SpO2:  [97 %-98 %] 97 %  BP: (111-129)/(63-88) 111/63     Weight: 81.8 kg (180 lb 6.1 oz) (kg)  Body mass index is 27.43 kg/m².     Physical Exam  Vitals and nursing note reviewed.   Constitutional:       General: He is not in acute distress.     Appearance: He is well-developed. He is not diaphoretic.   HENT:      Head:  Normocephalic and atraumatic.      Nose: No congestion or rhinorrhea.   Eyes:      General: No scleral icterus.        Right eye: No discharge.         Left eye: No discharge.      Extraocular Movements: Extraocular movements intact.      Pupils: Pupils are equal, round, and reactive to light.   Neck:      Thyroid: No thyromegaly.      Vascular: No JVD.   Cardiovascular:      Rate and Rhythm: Normal rate.   Pulmonary:      Effort: No respiratory distress.   Abdominal:      General: There is no distension.   Neurological:      Mental Status: He is alert and oriented to person, place, and time. Mental status is at baseline.   Psychiatric:         Mood and Affect: Affect is flat.         Thought Content: Thought content is paranoid.              CRANIAL NERVES     CN III, IV, VI   Pupils are equal, round, and reactive to light.       Significant Labs: All pertinent labs within the past 24 hours have been reviewed.  Recent Lab Results         09/09/23  0532   09/08/23  2125   09/08/23  1853        Benzodiazepines   Negative         Methadone metabolites   Negative         Phencyclidine   Negative         Albumin     4.0       Alcohol, Serum     <10       Alkaline Phosphatase     61       ALT     9       Amphetamine Screen, Ur   Negative         Anion Gap     11       Appearance, UA   Clear         AST     18       Barbiturate Screen, Ur   Negative         Baso #     0.05       Basophil %     0.6       Bilirubin (UA)   Negative         BILIRUBIN TOTAL     0.6  Comment: For infants and newborns, interpretation of results should be based  on gestational age, weight and in agreement with clinical  observations.    Premature Infant recommended reference ranges:  Up to 24 hours.............<8.0 mg/dL  Up to 48 hours............<12.0 mg/dL  3-5 days..................<15.0 mg/dL  6-29 days.................<15.0 mg/dL         BUN     18       Calcium     10.2       Chloride     113       Cholesterol 197  Comment: The National  Cholesterol Education Program (NCEP) has set the  following guidelines (reference ranges) for Cholesterol:  Optimal.....................<200 mg/dL  Borderline High.............200-239 mg/dL  High........................> or = 240 mg/dL             CO2     18       Cocaine (Metab.)   Negative         Color, UA   Colorless         Creatinine     1.2       Creatinine, Urine   51.0         Differential Method     Automated       eGFR     >60       Eos #     0.1       Eosinophil %     1.1       Estimated Avg Glucose 103           Glucose     103       Glucose, UA   Negative         Gran # (ANC)     6.0       Gran %     68.7       HDL 41  Comment: The National Cholesterol Education Program (NCEP) has set the  following guidelines (reference values) for HDL Cholesterol:  Low...............<40 mg/dL  Optimal...........>60 mg/dL             HDL/Cholesterol Ratio 20.8           Hematocrit     45.6       Hemoglobin     15.5       Hemoglobin A1C External 5.2  Comment: ADA Screening Guidelines:  5.7-6.4%  Consistent with prediabetes  >or=6.5%  Consistent with diabetes    High levels of fetal hemoglobin interfere with the HbA1C  assay. Heterozygous hemoglobin variants (HbS, HgC, etc)do  not significantly interfere with this assay.   However, presence of multiple variants may affect accuracy.             Immature Grans (Abs)     0.03  Comment: Mild elevation in immature granulocytes is non specific and   can be seen in a variety of conditions including stress response,   acute inflammation, trauma and pregnancy. Correlation with other   laboratory and clinical findings is essential.         Immature Granulocytes     0.3       Ketones, UA   Negative         LDL Cholesterol External 120.0  Comment: The National Cholesterol Education Program (NCEP) has set the  following guidelines (reference values) for LDL Cholesterol:  Optimal.......................<130 mg/dL  Borderline High...............130-159  mg/dL  High..........................160-189 mg/dL  Very High.....................>190 mg/dL             Leukocytes, UA   Negative         Lymph #     1.9       Lymph %     21.7       MCH     30.5       MCHC     34.0       MCV     90       Mono #     0.7       Mono %     7.6       MPV     8.7       NITRITE UA   Negative         Non-HDL Cholesterol 156  Comment: Risk category and Non-HDL cholesterol goals:  Coronary heart disease (CHD)or equivalent (10-year risk of CHD >20%):  Non-HDL cholesterol goal     <130 mg/dL  Two or more CHD risk factors and 10-year risk of CHD <= 20%:  Non-HDL cholesterol goal     <160 mg/dL  0 to 1 CHD risk factor:  Non-HDL cholesterol goal     <190 mg/dL             nRBC     0       Occult Blood UA   Negative         Opiate Scrn, Ur   Negative         pH, UA   7.0         Platelets     179       Potassium     4.0       PROTEIN TOTAL     7.5       Protein, UA   Negative  Comment: Recommend a 24 hour urine protein or a urine   protein/creatinine ratio if globulin induced proteinuria is  clinically suspected.           RBC     5.08       RDW     13.9       Sodium     142       Specific [place], UA   1.010         Specimen UA   Urine, Clean Catch         Marijuana (THC) Metabolite   Negative         Total Cholesterol/HDL Ratio 4.8           Toxicology Information   SEE COMMENT  Comment: This screen includes the following classes of drugs at the listed   cut-off:    Benzodiazepines 200 ng/ml  Methadone 300 ng/ml  Cocaine metabolite 300 ng/ml  Opiates 300 ng/ml  Barbiturates 200 ng/ml  Amphetamines 1000 ng/ml  Marijuana metabs (THC) 50 ng/ml  Phencyclidine (PCP) 25 ng/ml    This is a screening test. If results do not correlate with clinical   presentation, then a confirmatory send out test is advised.     This report is intended for use in clinical monitoring and management   of   patients. It is not intended for use in employment related drug   testing.           Triglycerides 180  Comment: The  National Cholesterol Education Program (NCEP) has set the  following guidelines (reference values) for triglycerides:  Normal......................<150 mg/dL  Borderline High.............150-199 mg/dL  High........................200-499 mg/dL             TSH     1.702       UROBILINOGEN UA   Negative         WBC     8.77               Significant Imaging: I have reviewed all pertinent imaging results/findings within the past 24 hours.

## 2023-09-09 NOTE — ASSESSMENT & PLAN NOTE
Patient a history of depression, brought to the ED for evaluation, having paranoid delusion, off medication, patient PT, admitted to psych for medication adjustment, med rec, history of hypertension, not on any med, blood pressure stable currently, will continue to monitor, if pressure elevate plan to start medication, psych meds and CBT per psych
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
## 2023-09-09 NOTE — ED PROVIDER NOTES
SCRIBE #1 NOTE: I, Lis Valdovinos, am scribing for, and in the presence of, Nimisha Kaur MD. I have scribed the entire note.      History      Chief Complaint   Patient presents with    Mental Health Problem     Paranoid delusional, Paramedics st patient has been off of psych medications. Denies HI or SI        Review of patient's allergies indicates:   Allergen Reactions    Haldol [haloperidol lactate]      Shaking      Lamictal [lamotrigine]     Trileptal [oxcarbazepine]         HPI   HPI    9/8/2023, 7:21 PM   History obtained from the patient and EMS  HPI/ROS limited secondary to psychiatric disorder.       History of Present Illness: Tristin Saha is a 63 y.o. male patient with a PMHx of depression who presents to the Emergency Department from home for a psychiatric evaluation. Per EMS, the pt has been having paranoid delusions and is not taking his psychiatric medications. Pt denies SI and HI.      Arrival mode: EMS    PCP: Shari Colon MD       Past Medical History:  Past Medical History:   Diagnosis Date    Depression        Past Surgical History:  No past surgical history on file.      Family History:  No family history on file.    Social History:  Social History     Tobacco Use    Smoking status: Some Days    Smokeless tobacco: Not on file   Substance and Sexual Activity    Alcohol use: Not on file    Drug use: Not on file    Sexual activity: Not on file       ROS   Review of Systems   Unable to perform ROS: Psychiatric disorder       Physical Exam      Initial Vitals [09/08/23 1847]   BP Pulse Resp Temp SpO2   129/88 72 16 99.1 °F (37.3 °C) 98 %      MAP       --          Physical Exam  Nursing Notes and Vital Signs Reviewed.  Constitutional: Patient is in no acute distress. Well-developed and well-nourished.  Head: Atraumatic. Normocephalic.  Eyes: PERRL. EOM intact. Conjunctivae are not pale. No scleral icterus.  ENT: Mucous membranes are moist. Oropharynx is clear and symmetric.    Neck:  Supple. Full ROM. No lymphadenopathy.  Cardiovascular: Regular rate. Regular rhythm. No murmurs, rubs, or gallops. Distal pulses are 2+ and symmetric.  Pulmonary/Chest: No respiratory distress. Clear to auscultation bilaterally. No wheezing or rales.  Abdominal: Soft and non-distended.  There is no tenderness.  No rebound, guarding, or rigidity.   Musculoskeletal: Moves all extremities. No obvious deformities. No edema.   Skin: Warm and dry.  Neurological:  Alert, awake, and appropriate. No acute focal neurological deficits are appreciated.  Psychiatric: Poor historian. Poor insight. Delusional. Paranoid. Disorganized.    ED Course    Procedures  ED Vital Signs:  Vitals:    09/08/23 1847   BP: 129/88   Pulse: 72   Resp: 16   Temp: 99.1 °F (37.3 °C)   TempSrc: Oral   SpO2: 98%       Abnormal Lab Results:  Labs Reviewed   CBC W/ AUTO DIFFERENTIAL - Abnormal; Notable for the following components:       Result Value    MPV 8.7 (*)     All other components within normal limits   COMPREHENSIVE METABOLIC PANEL - Abnormal; Notable for the following components:    Chloride 113 (*)     CO2 18 (*)     ALT 9 (*)     All other components within normal limits   ALCOHOL,MEDICAL (ETHANOL)   TSH   URINALYSIS, REFLEX TO URINE CULTURE   DRUG SCREEN PANEL, URINE EMERGENCY        All Lab Results:  Results for orders placed or performed during the hospital encounter of 09/08/23   CBC auto differential   Result Value Ref Range    WBC 8.77 3.90 - 12.70 K/uL    RBC 5.08 4.60 - 6.20 M/uL    Hemoglobin 15.5 14.0 - 18.0 g/dL    Hematocrit 45.6 40.0 - 54.0 %    MCV 90 82 - 98 fL    MCH 30.5 27.0 - 31.0 pg    MCHC 34.0 32.0 - 36.0 g/dL    RDW 13.9 11.5 - 14.5 %    Platelets 179 150 - 450 K/uL    MPV 8.7 (L) 9.2 - 12.9 fL    Immature Granulocytes 0.3 0.0 - 0.5 %    Gran # (ANC) 6.0 1.8 - 7.7 K/uL    Immature Grans (Abs) 0.03 0.00 - 0.04 K/uL    Lymph # 1.9 1.0 - 4.8 K/uL    Mono # 0.7 0.3 - 1.0 K/uL    Eos # 0.1 0.0 - 0.5 K/uL    Baso # 0.05 0.00  - 0.20 K/uL    nRBC 0 0 /100 WBC    Gran % 68.7 38.0 - 73.0 %    Lymph % 21.7 18.0 - 48.0 %    Mono % 7.6 4.0 - 15.0 %    Eosinophil % 1.1 0.0 - 8.0 %    Basophil % 0.6 0.0 - 1.9 %    Differential Method Automated    Comprehensive metabolic panel   Result Value Ref Range    Sodium 142 136 - 145 mmol/L    Potassium 4.0 3.5 - 5.1 mmol/L    Chloride 113 (H) 95 - 110 mmol/L    CO2 18 (L) 23 - 29 mmol/L    Glucose 103 70 - 110 mg/dL    BUN 18 8 - 23 mg/dL    Creatinine 1.2 0.5 - 1.4 mg/dL    Calcium 10.2 8.7 - 10.5 mg/dL    Total Protein 7.5 6.0 - 8.4 g/dL    Albumin 4.0 3.5 - 5.2 g/dL    Total Bilirubin 0.6 0.1 - 1.0 mg/dL    Alkaline Phosphatase 61 55 - 135 U/L    AST 18 10 - 40 U/L    ALT 9 (L) 10 - 44 U/L    eGFR >60 >60 mL/min/1.73 m^2    Anion Gap 11 8 - 16 mmol/L   Ethanol   Result Value Ref Range    Alcohol, Serum <10 <10 mg/dL         Imaging Results:  Imaging Results    None                 The Emergency Provider reviewed the vital signs and test results, which are outlined above.    ED Discussion     7:21 PM: The PEC hold has been issued by Dr. Kaur at this time for grave disability.      7:48 PM: Pt has been medically cleared by Dr. Kaur at this time. Reassessed pt at this time. Pt is resting comfortably and appears in no acute distress. There are no psychiatric services offered at this facility. D/w pt all pertinent ED information and plan to transfer to psychiatric facility for psychiatric treatment. Pt verbalizes understanding. Patient being transferred by [facility] for ongoing personal protection en route. Pt has been made aware of all risks and benefits associated with transfer, including but not limited to death, MVC, loss of vital signs, and/or permanent disability. Benefits include ability to be treated at an inpatient psychiatric facility. Pt will be transported by personnel trained in CPR and CPI. Patient understands that there could be unforeseen motor vehicle accidents, inclement weather, or  loss of vital signs that could result in potential death or permanent disability. All questions and complaints have been addressed at this time. Pt condition is stable at this time and is clear to transfer to psychiatric facility at this time.            ED Medication(s):  Medications - No data to display        New Prescriptions    No medications on file         Medical Decision Making    Medical Decision Making  Exacerbation of chronic psychosis  Infection  Dehydration    Hx of schizoaffective disorder, non compliance presents with exacerbation of his chronic psychosis, PEC in place, lab work reviewed and otherwise normal, medically cleared for placement.     Amount and/or Complexity of Data Reviewed  Labs: ordered. Decision-making details documented in ED Course.                Scribe Attestation:   Scribe #1: I performed the above scribed service and the documentation accurately describes the services I performed. I attest to the accuracy of the note.    Attending:   Physician Attestation Statement for Scribe #1: I, Nimisha Kaur MD, personally performed the services described in this documentation, as scribed by Lis Valdovinos, in my presence, and it is both accurate and complete.          Clinical Impression       ICD-10-CM ICD-9-CM   1. Other schizoaffective disorders  F25.8 295.70   2. Medical clearance for psychiatric admission  Z00.8 V70.8   3. Paranoid delusion  F22 297.9       Disposition:   Disposition: Transferred  Condition: Stable         Nimisha Kaur MD  09/08/23 1957

## 2023-09-09 NOTE — HPI
Patient is a 63 y.o. male patient with a PMHx of depression who presents to the Emergency Department from home for a psychiatric evaluation. Per EMS, the pt has been having paranoid delusions and is not taking his psychiatric medications. Pt denies SI and HI.

## 2023-09-09 NOTE — H&P
[facility]) Hospital Medicine  History & Physical    Patient Name: Tristin Saha  MRN: [id number]  Patient Class: IP- Psych  Admission Date: 9/9/2023  Attending Physician: Julio Cesar Amin MD   Primary Care Provider: Shari Colon MD         Patient information was obtained from patient and ER records.     Subjective:     Principal Problem:Acute psychosis    Chief Complaint: No chief complaint on file.       HPI: Patient is a 63 y.o. male patient with a PMHx of depression who presents to the Emergency Department from home for a psychiatric evaluation. Per EMS, the pt has been having paranoid delusions and is not taking his psychiatric medications. Pt denies SI and HI.      Past Medical History:   Diagnosis Date    Depression        No past surgical history on file.    Review of patient's allergies indicates:   Allergen Reactions    Haldol [haloperidol lactate]      Shaking      Lamictal [lamotrigine]     Trileptal [oxcarbazepine]        No current facility-administered medications on file prior to encounter.     Current Outpatient Medications on File Prior to Encounter   Medication Sig    busPIRone (BUSPAR) 15 MG tablet Take 1 tablet (15 mg total) by mouth 2 (two) times daily.    diclofenac sodium 1 % Gel Apply topically 3 (three) times daily as needed (neck/back/arm pain). 10 g tube ok    gabapentin (NEURONTIN) 300 MG capsule Take 2 capsules (600 mg total) by mouth 3 (three) times daily.    lithium (LITHOBID) 300 MG CR tablet Take 2 tablets (600 mg total) by mouth every 12 (twelve) hours.    nicotine (NICODERM CQ) 21 mg/24 hr Place 1 patch onto the skin once daily.    risperidone (RISPERDAL) 4 MG tablet Take 1 tablet (4 mg total) by mouth every evening.     Family History    None       Tobacco Use    Smoking status: Some Days    Smokeless tobacco: Not on file   Substance and Sexual Activity    Alcohol use: Not on file    Drug use: Not on file    Sexual  activity: Not on file     Review of Systems   Unable to perform ROS: Psychiatric disorder     Objective:     Vital Signs (Most Recent):  Temp: 97.7 °F (36.5 °C) (09/09/23 0822)  Pulse: (!) 52 (09/09/23 0822)  Resp: 18 (09/09/23 0822)  BP: 111/63 (09/09/23 0822)  SpO2: 97 % (09/09/23 0822) Vital Signs (24h Range):  Temp:  [97.7 °F (36.5 °C)-99.1 °F (37.3 °C)] 97.7 °F (36.5 °C)  Pulse:  [52-72] 52  Resp:  [16-18] 18  SpO2:  [97 %-98 %] 97 %  BP: (111-129)/(63-88) 111/63     Weight: 81.8 kg (180 lb 6.1 oz) (kg)  Body mass index is 27.43 kg/m².     Physical Exam  Vitals and nursing note reviewed.   Constitutional:       General: He is not in acute distress.     Appearance: He is well-developed. He is not diaphoretic.   HENT:      Head: Normocephalic and atraumatic.      Nose: No congestion or rhinorrhea.   Eyes:      General: No scleral icterus.        Right eye: No discharge.         Left eye: No discharge.      Extraocular Movements: Extraocular movements intact.      Pupils: Pupils are equal, round, and reactive to light.   Neck:      Thyroid: No thyromegaly.      Vascular: No JVD.   Cardiovascular:      Rate and Rhythm: Normal rate.   Pulmonary:      Effort: No respiratory distress.   Abdominal:      General: There is no distension.   Neurological:      Mental Status: He is alert and oriented to person, place, and time. Mental status is at baseline.   Psychiatric:         Mood and Affect: Affect is flat.         Thought Content: Thought content is paranoid.              CRANIAL NERVES     CN III, IV, VI   Pupils are equal, round, and reactive to light.       Significant Labs: All pertinent labs within the past 24 hours have been reviewed.  Recent Lab Results         09/09/23  0532   09/08/23 2125   09/08/23  1853        Benzodiazepines   Negative         Methadone metabolites   Negative         Phencyclidine   Negative         Albumin     4.0       Alcohol, Serum     <10       Alkaline Phosphatase     61       ALT      9       Amphetamine Screen, Ur   Negative         Anion Gap     11       Appearance, UA   Clear         AST     18       Barbiturate Screen, Ur   Negative         Baso #     0.05       Basophil %     0.6       Bilirubin (UA)   Negative         BILIRUBIN TOTAL     0.6  Comment: For infants and newborns, interpretation of results should be based  on gestational age, weight and in agreement with clinical  observations.    Premature Infant recommended reference ranges:  Up to 24 hours.............<8.0 mg/dL  Up to 48 hours............<12.0 mg/dL  3-5 days..................<15.0 mg/dL  6-29 days.................<15.0 mg/dL         BUN     18       Calcium     10.2       Chloride     113       Cholesterol 197  Comment: The National Cholesterol Education Program (NCEP) has set the  following guidelines (reference ranges) for Cholesterol:  Optimal.....................<200 mg/dL  Borderline High.............200-239 mg/dL  High........................> or = 240 mg/dL             CO2     18       Cocaine (Metab.)   Negative         Color, UA   Colorless         Creatinine     1.2       Creatinine, Urine   51.0         Differential Method     Automated       eGFR     >60       Eos #     0.1       Eosinophil %     1.1       Estimated Avg Glucose 103           Glucose     103       Glucose, UA   Negative         Gran # (ANC)     6.0       Gran %     68.7       HDL 41  Comment: The National Cholesterol Education Program (NCEP) has set the  following guidelines (reference values) for HDL Cholesterol:  Low...............<40 mg/dL  Optimal...........>60 mg/dL             HDL/Cholesterol Ratio 20.8           Hematocrit     45.6       Hemoglobin     15.5       Hemoglobin A1C External 5.2  Comment: ADA Screening Guidelines:  5.7-6.4%  Consistent with prediabetes  >or=6.5%  Consistent with diabetes    High levels of fetal hemoglobin interfere with the HbA1C  assay. Heterozygous hemoglobin variants (HbS, HgC, etc)do  not significantly  interfere with this assay.   However, presence of multiple variants may affect accuracy.             Immature Grans (Abs)     0.03  Comment: Mild elevation in immature granulocytes is non specific and   can be seen in a variety of conditions including stress response,   acute inflammation, trauma and pregnancy. Correlation with other   laboratory and clinical findings is essential.         Immature Granulocytes     0.3       Ketones, UA   Negative         LDL Cholesterol External 120.0  Comment: The National Cholesterol Education Program (NCEP) has set the  following guidelines (reference values) for LDL Cholesterol:  Optimal.......................<130 mg/dL  Borderline High...............130-159 mg/dL  High..........................160-189 mg/dL  Very High.....................>190 mg/dL             Leukocytes, UA   Negative         Lymph #     1.9       Lymph %     21.7       MCH     30.5       MCHC     34.0       MCV     90       Mono #     0.7       Mono %     7.6       MPV     8.7       NITRITE UA   Negative         Non-HDL Cholesterol 156  Comment: Risk category and Non-HDL cholesterol goals:  Coronary heart disease (CHD)or equivalent (10-year risk of CHD >20%):  Non-HDL cholesterol goal     <130 mg/dL  Two or more CHD risk factors and 10-year risk of CHD <= 20%:  Non-HDL cholesterol goal     <160 mg/dL  0 to 1 CHD risk factor:  Non-HDL cholesterol goal     <190 mg/dL             nRBC     0       Occult Blood UA   Negative         Opiate Scrn, Ur   Negative         pH, UA   7.0         Platelets     179       Potassium     4.0       PROTEIN TOTAL     7.5       Protein, UA   Negative  Comment: Recommend a 24 hour urine protein or a urine   protein/creatinine ratio if globulin induced proteinuria is  clinically suspected.           RBC     5.08       RDW     13.9       Sodium     142       Specific [place], UA   1.010         Specimen UA   Urine, Clean Catch         Marijuana (THC) Metabolite   Negative          Total Cholesterol/HDL Ratio 4.8           Toxicology Information   SEE COMMENT  Comment: This screen includes the following classes of drugs at the listed   cut-off:    Benzodiazepines 200 ng/ml  Methadone 300 ng/ml  Cocaine metabolite 300 ng/ml  Opiates 300 ng/ml  Barbiturates 200 ng/ml  Amphetamines 1000 ng/ml  Marijuana metabs (THC) 50 ng/ml  Phencyclidine (PCP) 25 ng/ml    This is a screening test. If results do not correlate with clinical   presentation, then a confirmatory send out test is advised.     This report is intended for use in clinical monitoring and management   of   patients. It is not intended for use in employment related drug   testing.           Triglycerides 180  Comment: The National Cholesterol Education Program (NCEP) has set the  following guidelines (reference values) for triglycerides:  Normal......................<150 mg/dL  Borderline High.............150-199 mg/dL  High........................200-499 mg/dL             TSH     1.702       UROBILINOGEN UA   Negative         WBC     8.77               Significant Imaging: I have reviewed all pertinent imaging results/findings within the past 24 hours.    Assessment/Plan:     * Acute psychosis  Patient a history of depression, brought to the ED for evaluation, having paranoid delusion, off medication, patient PT, admitted to psych for medication adjustment, med rec, history of hypertension, not on any med, blood pressure stable currently, will continue to monitor, if pressure elevate plan to start medication, psych meds and CBT per psych      Secondary hypertension  Monitor pressure.  Stable currently, no home meds listed, add if needed      Schizoaffective disorder, bipolar type  See above        VTE Risk Mitigation (From admission, onward)    None                     Khoi Mooney MD  Department of Hospital Medicine  [facility] ([facility])

## 2023-09-10 PROCEDURE — 99233 SBSQ HOSP IP/OBS HIGH 50: CPT | Mod: ,,, | Performed by: STUDENT IN AN ORGANIZED HEALTH CARE EDUCATION/TRAINING PROGRAM

## 2023-09-10 PROCEDURE — 25000003 PHARM REV CODE 250: Performed by: STUDENT IN AN ORGANIZED HEALTH CARE EDUCATION/TRAINING PROGRAM

## 2023-09-10 PROCEDURE — 99233 PR SUBSEQUENT HOSPITAL CARE,LEVL III: ICD-10-PCS | Mod: ,,, | Performed by: STUDENT IN AN ORGANIZED HEALTH CARE EDUCATION/TRAINING PROGRAM

## 2023-09-10 PROCEDURE — 11400000 HC PSYCH PRIVATE ROOM

## 2023-09-10 RX ORDER — OLANZAPINE 5 MG/1
5 TABLET ORAL 2 TIMES DAILY
Status: DISCONTINUED | OUTPATIENT
Start: 2023-09-10 | End: 2023-09-10

## 2023-09-10 RX ORDER — OLANZAPINE 5 MG/1
5 TABLET ORAL NIGHTLY
Status: DISCONTINUED | OUTPATIENT
Start: 2023-09-10 | End: 2023-09-14

## 2023-09-10 RX ADMIN — OLANZAPINE 5 MG: 5 TABLET, FILM COATED ORAL at 09:09
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
## 2023-09-10 NOTE — PLAN OF CARE
Problem: Adult Behavioral Health Plan of Care  Goal: Plan of Care Review  Outcome: Ongoing, Progressing  Goal: Patient-Specific Goal (Individualization)  Outcome: Ongoing, Progressing  Goal: Adheres to Safety Considerations for Self and Others  Outcome: Ongoing, Progressing  Goal: Absence of New-Onset Illness or Injury  Outcome: Ongoing, Progressing  Goal: Optimized Coping Skills in Response to Life Stressors  Outcome: Ongoing, Progressing     Problem: Activity and Energy Impairment (Depressive Signs/Symptoms)  Goal: Optimized Energy Level (Depressive Signs/Symptoms)  Outcome: Ongoing, Progressing     Problem: Cognitive Impairment (Depressive Signs/Symptoms)  Goal: Optimized Cognitive Function  Outcome: Ongoing, Progressing     Problem: Decreased Participation/Engagement (Depressive Signs/Symptoms)  Goal: Increased Participation and Engagement (Depressive Signs/Symptoms)  Outcome: Ongoing, Progressing     Problem: Feelings of Worthlessness, Hopelessness or Excessive Guilt (Depressive Signs/Symptoms)  Goal: Enhanced Self-Esteem and Confidence (Depressive Signs/Symptoms)  Outcome: Ongoing, Progressing     Problem: Mood Impairment (Depressive Signs/Symptoms)  Goal: Improved Mood Symptoms (Depressive Signs/Symptoms)  Outcome: Ongoing, Progressing     Problem: Nutrition Imbalance (Depressive Signs/Symptoms)  Goal: Optimized Nutrition Intake  Outcome: Ongoing, Progressing     Problem: Psychomotor Impairment (Depressive Signs/Symptoms)  Goal: Improved Psychomotor Symptoms (Depressive Signs/Symptoms)  Outcome: Ongoing, Progressing     Problem: Sleep Disturbance (Depressive Signs/Symptoms)  Goal: Improved Sleep (Depressive Signs/Symptoms)  Outcome: Ongoing, Progressing     Problem: Social, Occupational or Functional Impairment (Depressive Signs/Symptoms)  Goal: Enhanced Social, Occupational or Functional Skills (Depressive Signs/Symptoms)  Outcome: Ongoing, Progressing     Problem: Suicidal Behavior  Goal: Suicidal  Behavior is Absent or Managed  Outcome: Ongoing, Progressing     Problem: Coping Ineffective  Goal: Effective Coping  Outcome: Ongoing, Progressing     Problem: Behavior Regulation Impairment (Psychotic Signs/Symptoms)  Goal: Improved Behavioral Control (Psychotic Signs/Symptoms)  Outcome: Ongoing, Progressing     Problem: Mood Impairment (Psychotic Signs/Symptoms)  Goal: Improved Mood Symptoms (Psychotic Signs/Symptoms)  Outcome: Ongoing, Progressing

## 2023-09-10 NOTE — NURSING
POC reviewed this shift and is on going.  Pt cooperative on unit.  Pt denies SI/HI/AVH.  Pt is paranoid, delusional, frequently makes disorganized, rambling statements.  Pt repeatedly states that he has  and come back from the dead 6 times.
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
## 2023-09-10 NOTE — PLAN OF CARE
"Pt. Came to nursing station stating that "Peacefully refused some things. I'M  going to die from Zyprexa. I'v  6 times." Pt. Did refused his night medications and became agitated when staff tried to encourage him. He quickly became came when staff decreased verbal contact. Will cont. To monitor Pt.  "

## 2023-09-11 PROCEDURE — 99233 SBSQ HOSP IP/OBS HIGH 50: CPT | Mod: ,,, | Performed by: STUDENT IN AN ORGANIZED HEALTH CARE EDUCATION/TRAINING PROGRAM

## 2023-09-11 PROCEDURE — 11400000 HC PSYCH PRIVATE ROOM

## 2023-09-11 PROCEDURE — 25000003 PHARM REV CODE 250: Performed by: STUDENT IN AN ORGANIZED HEALTH CARE EDUCATION/TRAINING PROGRAM

## 2023-09-11 PROCEDURE — 99233 PR SUBSEQUENT HOSPITAL CARE,LEVL III: ICD-10-PCS | Mod: ,,, | Performed by: STUDENT IN AN ORGANIZED HEALTH CARE EDUCATION/TRAINING PROGRAM

## 2023-09-11 PROCEDURE — 25000003 PHARM REV CODE 250: Performed by: PSYCHIATRY & NEUROLOGY

## 2023-09-11 RX ADMIN — MUPIROCIN: 20 OINTMENT TOPICAL at 12:09

## 2023-09-11 RX ADMIN — OLANZAPINE 5 MG: 5 TABLET, FILM COATED ORAL at 08:09

## 2023-09-11 RX ADMIN — MUPIROCIN: 20 OINTMENT TOPICAL at 08:09

## 2023-09-11 NOTE — NURSING
Patient POC reviewed with patient and continued today. Patient denies SI/HI. Patient reports he is feeling better when talking with the Dr. Patient compliant with his medication and did not need any prn medications today. Patient AAOx4 today. Patient self isolating in room, and only came out to eat and get snacks. Will continue to monitor during shift.

## 2023-09-11 NOTE — PLAN OF CARE
"Behavioral Health Unit  Psychosocial History and Assessment  Progress Note      Patient Name: Tristin Saha YOB: 1960 SW: CHAVEZ Corral  Date: 9/11/2023    Chief Complaint: delusions     Consent:     Did the patient consent for an interview with the ? Yes    Did the patient consent for the  to contact family/friend/caregiver?   Yes  Name: Zee Persaud, Relationship: wife, and Contact: [phone]    Did the patient give consent for the  to inform family/friend/caregiver of his/her whereabouts or to discuss discharge planning? Yes    Source of Information: Face to face with patient, Telephone interview with family/friend/caregiver, and Chart review    Is information obtained from interviews considered reliable?   yes    Reason for Admission:     Active Hospital Problems    Diagnosis  POA    *Acute psychosis [F23]  Yes    Secondary hypertension [I15.9]  Yes     Chronic    Schizoaffective disorder, bipolar type [F25.0]  Yes     Chronic      Resolved Hospital Problems   No resolved problems to display.       History of Present Illness - (Patient Perception):   "A lot of things are coming up missing I got upset and I believe my wife called 911.     History of Present Illness - (Perception of Others): The past few days he was refusing to take his medication at night. He was having delusional thoughts about someone and wanted to use my phone to call the police and I refused. He said he was going to go to the store to call so he took off walking and I sent an ambulance according to Zee Persaud     Present biopsychosocial functioning: Per MD Note, Pt is a 63 y.o. male with a past psychiatric history of  schizophrenia  currently admitted to the inpatient unit with the following chief complaint: delusions.  Pt UDS was negative. Pt denies SI/HI. Pt appeared paranoid. Pt refused to answer certain questions. Pt lives in home with wife. Pt has 3 adult " children. Pt reports intact relationship with family and wife. Pt is disabled. Pt appears to lacks insight into illness. Pt can perform all ADLs. Pt denies any recent changes, stressors, and losses. Family reports most recent stressor as non compliance with medication.     Past biopsychosocial functioning: Family reports psychiatric hx of manic depression and schizoaffective disorder. Family reports history of inpatient and outpatient treatments since teenage years.     Family and Marital/Relationship History:     Significant Other/Partner Relationships:  : Relationship intact    Family Relationships: Intact      Childhood History:     Where was patient raised? Unknown     Who raised the patient? Unknown       How does patient describe their childhood? Unknown       Who is patient's primary support person? Wife, Zee Yifan       Culture and Congregation:     Congregation: Christianity    How strong of a role does Episcopal and spirituality play in patient's life? Unknown     Buddhist or spiritual concerns regarding treatment: not applicable     History of Abuse:   History of Abuse: unable to assess        Psychiatric and Medical History:     History of psychiatric illness or treatment: prior inpatient treatment, prior suicide attempt(s), has participated in counseling/psychotherapy on an outpatient basis in the past, and currently under psychiatric care    Medical history:   Past Medical History:   Diagnosis Date    Depression        Substance Abuse History:     Alcohol - (Patient Perspective):   Social History     Substance and Sexual Activity   Alcohol Use None       Alcohol - (Collateral Perspective): Alcoholic in the past  according to Zee Persaud     Drugs - (Patient Perspective):   Social History     Substance and Sexual Activity   Drug Use Not on file       Drugs - (Collateral Perspective): None according to Zee Persaud     Education:     Currently Enrolled? No  Attended College/Technical  School    Special Education? No    Interested in Completing Education/GED: No    Employment and Financial:     Currently employed? Disabled     Source of Income:  SSDI     Able to afford basic needs (food, shelter, utilities)? Yes    Who manages finances/personal affairs? wife      Service:     ? no    Combat Service? No     Community Resources:     Describe present use of community resources: Medicaid      Identify previously used community resources   (Include previous mental health treatment - outpatient and inpatient): Family reports history of inpatient and outpatient treatments since teenage years.     Environmental:     Current living situation:Lives with spouse, Lives in home    Social Evaluation:     Patient Assets: Communicable skills    Patient Limitations: disabled, lacks insight into illness     High risk psychosocial issues that may impact discharge planning:   Noncompliance with outpatient treatment     Recommendations:     Anticipated discharge plan:   Home with family, outpatient follow up     High risk issues requiring early treatment planning and immediate intervention: delusions     Community resources needed for discharge planning:  aftercare treatment sources    Anticipated social work role(s) in treatment and discharge planning: SW will facilitate process groups to assist pt develop healthy coping skills; CM will arrange outpatient follow-up appointments and assist with discharge planning.

## 2023-09-11 NOTE — PROGRESS NOTES
"PSYCHIATRY DAILY INPATIENT PROGRESS NOTE  SUBSEQUENT HOSPITAL VISIT    ENCOUNTER DATE: 2023  [facility] [place]    DATE OF ADMISSION: 2023  3:49 AM  LENGTH OF STAY: 2 days      CHIEF COMPLAINT   Tristin Saha is a 63 y.o. male, seen during daily mcdaniels rounds on the inpatient unit.  Tristin Saha presented with the chief complaint of psychosis      The patient was seen and examined. The chart was reviewed.     Reviewed notes from Rns and labs from the last 24 hours.    The patient's case was discussed with the treatment team/care providers today including Rns    Staff reports several behavioral or management issues.     The patient has been less non compliant with treatment.      Subjective 2023       Today the patient much more cooperative today, "I feel better because I slept."    Remains delusional however does feel that zyprexa was helpful for him.    The patient denies any side effects to medications.        Interim/overnight events per report/notes:     Per RN:  Pt is paranoid, delusional, frequently makes disorganized, rambling statements.  Pt repeatedly states that he has  and come back from the dead 6 times.       Psychiatric ROS (observed, reported, or endorsed/denied):  Depressed mood - No  Interest/pleasure/anhedonia: No  Guilt/hopelessness/worthlessness - No  Changes in Sleep - No  Changes in Appetite - No  Changes in Concentration - No  Changes in Energy - No  PMA/R- No  Suicidal- active/passive ideations - No  Homicidal ideations: active/passive ideations - No    Hallucinations - No  Delusions - Continuing  Disorganized behavior - less  Disorganized speech - fluctuating  Negative symptoms - fluctuating    Elevated mood - No  Decreased need for sleep - fluctuating  Grandiosity - less  Racing thoughts - fluctuating  Impulsivity - less  Irritability- fluctuating  Increased energy - less  Distractibility - less  Increase in goal-directed activity or PMA- " fluctuating    Symptoms of EDDI - less  Symptoms of Panic Disorder- No  Symptoms of PTSD - No        Overall progress: Patient is showing no improvement on the Unit to date          Medical ROS  Review of Systems   Constitutional:  Negative for chills and fever.   HENT:  Negative for hearing loss.    Eyes:  Negative for blurred vision and double vision.   Respiratory:  Negative for shortness of breath.    Cardiovascular:  Negative for chest pain and palpitations.   Gastrointestinal:  Negative for constipation, diarrhea, nausea and vomiting.   Genitourinary:  Negative for dysuria.   Musculoskeletal:  Negative for back pain and neck pain.   Skin:  Negative for rash.   Neurological:  Negative for dizziness and headaches.   Endo/Heme/Allergies:  Negative for environmental allergies.         PAST MEDICAL HISTORY   Past Medical History:   Diagnosis Date    Depression            PSYCHOTROPIC MEDICATIONS   Scheduled Meds:   mupirocin   Nasal BID    OLANZapine  5 mg Oral QHS     Continuous Infusions:  PRN Meds:.acetaminophen, aluminum-magnesium hydroxide-simethicone, docusate sodium, hydrOXYzine pamoate, loperamide, nicotine, OLANZapine **AND** OLANZapine        EXAMINATION    VITALS   Vitals:    09/09/23 1907 09/10/23 0727 09/10/23 1951 09/11/23 0731   BP: 110/63 113/73 135/74 117/72   BP Location: Left arm Left arm Left arm Left arm   Patient Position:  Lying Sitting Sitting   Pulse: 60 (!) 54 60 64   Resp: 20 18 18 20   Temp: 97.8 °F (36.6 °C) 97.2 °F (36.2 °C) 98.6 °F (37 °C) 97.6 °F (36.4 °C)   TempSrc: Oral Oral Oral Oral   SpO2: 96% 96% 96% 96%   Weight:       Height:           Body mass index is 27.43 kg/m².        CONSTITUTIONAL  General Appearance: unremarkable, age appropriate    MUSCULOSKELETAL  Muscle Strength and Tone:no tremor, no tic  Abnormal Involuntary Movements: No  Gait and Station: non-ataxic    PSYCHIATRIC   Level of Consciousness: awake and alert   Orientation: person, place, and situation  Grooming:  Disheveled and Hospital garb  Psychomotor Behavior: restless and fidgety   Speech: loud, pressured  Language: grossly intact  Mood: anxious  Affect: Labile  Thought Process: tangential  Associations: loose   Thought Content: +delusions, denies SI, and denies HI  Perceptions: denies AH and denies  VH  Memory: Remote intact and Recent intact  Attention:Easily distracted  Fund of Knowledge: Vocabulary appropriate   Estimate if Intelligence:  Below average based on work/education history, vocabulary and mental status exam  Insight: limited awareness of illness  Judgment: limited        DIAGNOSTIC TESTING   Laboratory Results  No results found for this or any previous visit (from the past 24 hour(s)).           MEDICAL DECISION MAKING          ASSESSMENT         Schizoaffective disorder, bipolar type, MRE manic, severe  Medication non compliance  Agitation           PROBLEM LIST AND MANAGEMENT PLANS        Schizoaffective disorder, bipolar type, MRE manic, severe  - continue zyprexa 5 mg PO qhs (Pt agrees to take )  - pt counseled  - follow up with outpatient mental health providers after discharge for medication management and psychotherapy        Medication non compliance  - consider forced meds if pt refuses tx, h/o of forced med protocol in past per EMR  - monitor     Agitation   - continue zyprexa 10 mg PO/IM q 4 hours PRN for behavioral emergencies  - pt counseled  - monitor/precautions             Discussed diagnosis, risks and benefits of proposed treatment vs alternative treatments vs no treatment, potential side effects of these treatments and the inherent unpredictability of treatment. The patient expresses understanding of the above and displays the capacity to agree with this treatment given said understanding. Patient also agrees that, currently, the benefits outweigh the risks and would like to pursue/continue treatment at this time.    Any medications being used off-label were discussed with the patient  inclusive of the evidence base for the use of the medications and consent was obtained for the off-label use of the medication.       DISCHARGE PLANNING  Expected Disposition Plan: Home or Self Care      NEED FOR CONTINUED HOSPITALIZATION  Psychiatric illness continues to pose a potential threat to life or bodily function, of self or others, thereby requiring the need for continued inpatient psychiatric hospitalization: Yes, due to: gravely disabled, as evidenced by:  Ongoing concerns with perceptual aberrancy and paranoid persecutory delusions leading to potential harm of self or others.    Protective inpatient pyschiatric hospitalization required while a safe disposition plan is enacted: Yes    Patient stabilized and ready for discharge from inpatient psychiatric unit: No      The patient location is: [facility]    Visit type: audiovisual    Face to Face time with patient: 12  20 minutes of total time spent on the encounter, which includes face to face time and non-face to face time preparing to see the patient (eg, review of tests), Obtaining and/or reviewing separately obtained history, Documenting clinical information in the electronic or other health record, Independently interpreting results (not separately reported) and communicating results to the patient/family/caregiver, or Care coordination (not separately reported).     Each patient to whom he or she provides medical services by telemedicine is:  (1) informed of the relationship between the physician and patient and the respective role of any other health care provider with respect to management of the patient; and (2) notified that he or she may decline to receive medical services by telemedicine and may withdraw from such care at any time.          STAFF:   Bipin Mullins III, MD  Psychiatry

## 2023-09-11 NOTE — PLAN OF CARE
Pt. Denied any c/o of auditory or visual hallucinations. H e also denied any c/o suicidal or homicidal ideations. He remains delusional most times. He took his nightly medication. Will cont. To monitor Pt.

## 2023-09-11 NOTE — PLAN OF CARE
Collateral:   Zee Saha, wife, [phone]    Collateral Perception of Problem:   Was having delusional thoughts about someone and wanted to use my phone to call the police and I refused he said he was going to go to the store to call so he took off walking and sent an ambulance   The past few days he was refusing to take his medication at night     Previous Psych History/Hospitalizations:  Hx of inpatient treatment since teen years  Dx with Manic depression with schizoaffective disorder       Suicide Attempts (how/severity):  Last summer attempted to take too many of his pills    How long has pt had problems (childhood dx?):  Since teen    Impulse issues:  Delusions, paranoid behavior     History of violence:   When he drinks caffeine he tends to yell at times but has calmed down a lot     Drug Use:  None   Used to smoke and dip     Alcohol Use:  Alcoholic in the past (30 plus years)     Legal Issues:  None     Other Pertinent Info:       Baseline:  Sleeps a lot, can be active, never really too too happy, cares for our 4 dogs    Discharge Plan:  Home

## 2023-09-11 NOTE — PLAN OF CARE
Poc reviewed and is ongoing,in room all day out for meals only,after supper pt.began pacing the halls and making request,making rude remarks,loud,pressured speech,however thoughts or organized at this time,appetite adequate,mood labile,hygiene needs cooperation,did inquire into pts well being,pts.conversation focused on the president,conts.to be monitored as per protocol.care as per treatment plan.

## 2023-09-12 PROCEDURE — 25000003 PHARM REV CODE 250: Performed by: STUDENT IN AN ORGANIZED HEALTH CARE EDUCATION/TRAINING PROGRAM

## 2023-09-12 PROCEDURE — 11400000 HC PSYCH PRIVATE ROOM

## 2023-09-12 PROCEDURE — 99233 SBSQ HOSP IP/OBS HIGH 50: CPT | Mod: ,,, | Performed by: STUDENT IN AN ORGANIZED HEALTH CARE EDUCATION/TRAINING PROGRAM

## 2023-09-12 PROCEDURE — 99233 PR SUBSEQUENT HOSPITAL CARE,LEVL III: ICD-10-PCS | Mod: ,,, | Performed by: STUDENT IN AN ORGANIZED HEALTH CARE EDUCATION/TRAINING PROGRAM

## 2023-09-12 RX ADMIN — MUPIROCIN: 20 OINTMENT TOPICAL at 08:09

## 2023-09-12 RX ADMIN — OLANZAPINE 5 MG: 5 TABLET, FILM COATED ORAL at 08:09

## 2023-09-12 NOTE — PROGRESS NOTES
"PSYCHIATRY DAILY INPATIENT PROGRESS NOTE  SUBSEQUENT HOSPITAL VISIT    ENCOUNTER DATE: 9/12/2023  [facility]    DATE OF ADMISSION: 9/9/2023  3:49 AM  LENGTH OF STAY: 3 days      CHIEF COMPLAINT   Tristin Saha is a 63 y.o. male, seen during daily mcdaniels rounds on the inpatient unit.  Tristin Saha presented with the chief complaint of psychosis      The patient was seen and examined. The chart was reviewed.     Reviewed notes from Rns and labs from the last 24 hours.    The patient's case was discussed with the treatment team/care providers today including Rns    Staff reports no behavioral or management issues.     The patient has been less non compliant with treatment.      Subjective 09/12/2023    Patient somewhat agitated at times.     Remains delusional.    Agrees to remain compliant with zyprexa, "it helps me sleep," but refuses to increase dose.    The patient denies any side effects to medications.        Interim/overnight events per report/notes:     Per RN:  began pacing the halls and making request,making rude remarks,loud,pressured speech,however thoughts or organized at this time,      Psychiatric ROS (observed, reported, or endorsed/denied):  Depressed mood - No  Interest/pleasure/anhedonia: No  Guilt/hopelessness/worthlessness - No  Changes in Sleep - No  Changes in Appetite - No  Changes in Concentration - No  Changes in Energy - No  PMA/R- No  Suicidal- active/passive ideations - No  Homicidal ideations: active/passive ideations - No    Hallucinations - No  Delusions - Continuing  Disorganized behavior - less  Disorganized speech - fluctuating  Negative symptoms - fluctuating    Elevated mood - No  Decreased need for sleep - less  Grandiosity - less  Racing thoughts - fluctuating  Impulsivity - less  Irritability- fluctuating  Increased energy - less  Distractibility - less  Increase in goal-directed activity or PMA- fluctuating    Symptoms of EDDI - less  Symptoms of Panic " Disorder- No  Symptoms of PTSD - No        Overall progress: Patient is showing mild improvement           Medical ROS  Review of Systems   Constitutional:  Negative for chills and fever.   HENT:  Negative for hearing loss.    Eyes:  Negative for blurred vision and double vision.   Respiratory:  Negative for shortness of breath.    Cardiovascular:  Negative for chest pain and palpitations.   Gastrointestinal:  Negative for constipation, diarrhea, nausea and vomiting.   Genitourinary:  Negative for dysuria.   Musculoskeletal:  Negative for back pain and neck pain.   Skin:  Negative for rash.   Neurological:  Negative for dizziness and headaches.   Endo/Heme/Allergies:  Negative for environmental allergies.         PAST MEDICAL HISTORY   Past Medical History:   Diagnosis Date    Depression            PSYCHOTROPIC MEDICATIONS   Scheduled Meds:   mupirocin   Nasal BID    OLANZapine  5 mg Oral QHS     Continuous Infusions:  PRN Meds:.acetaminophen, aluminum-magnesium hydroxide-simethicone, docusate sodium, hydrOXYzine pamoate, loperamide, nicotine, OLANZapine **AND** OLANZapine        EXAMINATION    VITALS   Vitals:    09/10/23 1951 09/11/23 0731 09/11/23 1921 09/12/23 0739   BP: 135/74 117/72 127/74 127/77   BP Location: Left arm Left arm Left arm Left arm   Patient Position: Sitting Sitting Sitting Sitting   Pulse: 60 64 64 65   Resp: 18 20 20 20   Temp: 98.6 °F (37 °C) 97.6 °F (36.4 °C) 97.5 °F (36.4 °C) 98.2 °F (36.8 °C)   TempSrc: Oral Oral Oral Oral   SpO2: 96% 96% 97% 97%   Weight:       Height:           Body mass index is 27.43 kg/m².        CONSTITUTIONAL  General Appearance: unremarkable, age appropriate    MUSCULOSKELETAL  Muscle Strength and Tone:no tremor, no tic  Abnormal Involuntary Movements: No  Gait and Station: non-ataxic    PSYCHIATRIC   Level of Consciousness: awake and alert   Orientation: person, place, and situation  Grooming: Disheveled and Hospital garb  Psychomotor Behavior: restless and  fidgety   Speech: loud, rapid  Language: grossly intact  Mood: ok  Affect: Labile  Thought Process: less tangential  Associations: less loose   Thought Content: +delusions, denies SI, and denies HI  Perceptions: denies AH and denies  VH  Memory: Remote intact and Recent intact  Attention:Easily distracted  Fund of Knowledge: Vocabulary appropriate   Estimate if Intelligence:  Below average based on work/education history, vocabulary and mental status exam  Insight: limited awareness of illness  Judgment: limited        DIAGNOSTIC TESTING   Laboratory Results  No results found for this or any previous visit (from the past 24 hour(s)).           MEDICAL DECISION MAKING          ASSESSMENT         Schizoaffective disorder, bipolar type, MRE manic, severe  Medication non compliance  Agitation           PROBLEM LIST AND MANAGEMENT PLANS        Schizoaffective disorder, bipolar type, MRE manic, severe  - continue zyprexa 5 mg PO qhs (Pt agrees to take but refuses increases)  - pt counseled  - follow up with outpatient mental health providers after discharge for medication management and psychotherapy        Medication non compliance  - consider forced meds if pt refuses tx, h/o of forced med protocol in past per EMR  - monitor     Agitation   - continue zyprexa 10 mg PO/IM q 4 hours PRN for behavioral emergencies  - pt counseled  - monitor/precautions             Discussed diagnosis, risks and benefits of proposed treatment vs alternative treatments vs no treatment, potential side effects of these treatments and the inherent unpredictability of treatment. The patient expresses understanding of the above and displays the capacity to agree with this treatment given said understanding. Patient also agrees that, currently, the benefits outweigh the risks and would like to pursue/continue treatment at this time.    Any medications being used off-label were discussed with the patient inclusive of the evidence base for the use  of the medications and consent was obtained for the off-label use of the medication.       DISCHARGE PLANNING  Expected Disposition Plan: Home or Self Care      NEED FOR CONTINUED HOSPITALIZATION  Psychiatric illness continues to pose a potential threat to life or bodily function, of self or others, thereby requiring the need for continued inpatient psychiatric hospitalization: Yes, due to: gravely disabled, as evidenced by:  Ongoing concerns with perceptual aberrancy and paranoid persecutory delusions leading to potential harm of self or others.    Protective inpatient pyschiatric hospitalization required while a safe disposition plan is enacted: Yes    Patient stabilized and ready for discharge from inpatient psychiatric unit: No      The patient location is: [facility]    Visit type: audiovisual    Face to Face time with patient: 10  15 minutes of total time spent on the encounter, which includes face to face time and non-face to face time preparing to see the patient (eg, review of tests), Obtaining and/or reviewing separately obtained history, Documenting clinical information in the electronic or other health record, Independently interpreting results (not separately reported) and communicating results to the patient/family/caregiver, or Care coordination (not separately reported).     Each patient to whom he or she provides medical services by telemedicine is:  (1) informed of the relationship between the physician and patient and the respective role of any other health care provider with respect to management of the patient; and (2) notified that he or she may decline to receive medical services by telemedicine and may withdraw from such care at any time.          STAFF:   Bipin Mullins III, MD  Psychiatry

## 2023-09-12 NOTE — PLAN OF CARE
POC reviewed this shift and is ongoing.  Pt cooperative this shift, withdrawn to his room. Pt comes on unit as needed. Denies SI, HI, A/V hallucination. No ss of distress. No negative behavior this shift.

## 2023-09-12 NOTE — PLAN OF CARE
POC reviewed this shift and is on going. Patient is withdrawn, depressed, anxious, irritable, hostile, showing pressured speech, and paranoid.does not  Endorse Suicidal Ideation, Homicidal Ideation, Auditory Hallucinations, and Visual Hallucinations. Isolates in room with no interacting,there have been no group attendance,refused am meal,hygiene needs cooperation also,pt.can be very argumentative,minimal confusion apparent, Pt continues to be medication compliant and staff will continue to monitor pt closely while on the unit. Provide care as per treatment plan.

## 2023-09-13 PROCEDURE — 99233 SBSQ HOSP IP/OBS HIGH 50: CPT | Mod: ,,, | Performed by: STUDENT IN AN ORGANIZED HEALTH CARE EDUCATION/TRAINING PROGRAM

## 2023-09-13 PROCEDURE — 11400000 HC PSYCH PRIVATE ROOM

## 2023-09-13 PROCEDURE — 25000003 PHARM REV CODE 250: Performed by: STUDENT IN AN ORGANIZED HEALTH CARE EDUCATION/TRAINING PROGRAM

## 2023-09-13 PROCEDURE — 99233 PR SUBSEQUENT HOSPITAL CARE,LEVL III: ICD-10-PCS | Mod: ,,, | Performed by: STUDENT IN AN ORGANIZED HEALTH CARE EDUCATION/TRAINING PROGRAM

## 2023-09-13 RX ADMIN — OLANZAPINE 5 MG: 5 TABLET, FILM COATED ORAL at 08:09

## 2023-09-13 NOTE — PLAN OF CARE
POC  reviewed this shift and is ongoing.  Pt responding to outside stimuli. Takes meds as ordered. No ss of distress.

## 2023-09-13 NOTE — PROGRESS NOTES
"PSYCHIATRY DAILY INPATIENT PROGRESS NOTE  SUBSEQUENT HOSPITAL VISIT    ENCOUNTER DATE: 2023  SITE: [facility]    DATE OF ADMISSION: 2023  3:49 AM  LENGTH OF STAY: 4 days      CHIEF COMPLAINT   Tristin Saha is a 63 y.o. male, seen during daily mcdaniels rounds on the inpatient unit.  Tristin Saha presented with the chief complaint of psychosis      The patient was seen and examined. The chart was reviewed.     Reviewed notes from Rns and labs from the last 24 hours.    The patient's case was discussed with the treatment team/care providers today including Rns    Staff reports no behavioral or management issues.     The patient has been less non compliant with treatment.      Subjective 2023      Remains delusional, "I don't want to incriminate myself, but I have a good family, I have friends that are real smart... I  6 times and came back." Patient rambles with disconnected thoughts, very loud.    Observed RIS by staff.    The patient denies any side effects to medications.        Interim/overnight events per report/notes:     Per RN:  Pt responding to outside stimuli. Takes meds as ordered. No ss of distress.      Psychiatric ROS (observed, reported, or endorsed/denied):  Depressed mood - No  Interest/pleasure/anhedonia: No  Guilt/hopelessness/worthlessness - No  Changes in Sleep - No  Changes in Appetite - No  Changes in Concentration - No  Changes in Energy - No  PMA/R- No  Suicidal- active/passive ideations - No  Homicidal ideations: active/passive ideations - No    Hallucinations - fluctuating  Delusions - Continuing  Disorganized behavior - less  Disorganized speech - fluctuating  Negative symptoms - fluctuating    Elevated mood - No  Decreased need for sleep - less  Grandiosity - less  Racing thoughts - fluctuating  Impulsivity - less  Irritability- fluctuating  Increased energy - less  Distractibility - less  Increase in goal-directed activity or PMA- fluctuating    Symptoms of " EDDI - less  Symptoms of Panic Disorder- No  Symptoms of PTSD - No        Overall progress: Patient is showing mild improvement           Medical ROS  Review of Systems   Constitutional:  Negative for chills and fever.   HENT:  Negative for hearing loss.    Eyes:  Negative for blurred vision and double vision.   Respiratory:  Negative for shortness of breath.    Cardiovascular:  Negative for chest pain and palpitations.   Gastrointestinal:  Negative for constipation, diarrhea, nausea and vomiting.   Genitourinary:  Negative for dysuria.   Musculoskeletal:  Negative for back pain and neck pain.   Skin:  Negative for rash.   Neurological:  Negative for dizziness and headaches.   Endo/Heme/Allergies:  Negative for environmental allergies.         PAST MEDICAL HISTORY   Past Medical History:   Diagnosis Date    Depression            PSYCHOTROPIC MEDICATIONS   Scheduled Meds:   mupirocin   Nasal BID    OLANZapine  5 mg Oral QHS     Continuous Infusions:  PRN Meds:.acetaminophen, aluminum-magnesium hydroxide-simethicone, docusate sodium, hydrOXYzine pamoate, loperamide, nicotine, OLANZapine **AND** OLANZapine        EXAMINATION    VITALS   Vitals:    09/11/23 1921 09/12/23 0739 09/12/23 1918 09/13/23 0758   BP: 127/74 127/77 136/81 139/79   BP Location: Left arm Left arm Left arm Left arm   Patient Position: Sitting Sitting Sitting Sitting   Pulse: 64 65 67 68   Resp: 20 20 16 18   Temp: 97.5 °F (36.4 °C) 98.2 °F (36.8 °C) 98.7 °F (37.1 °C) 98.6 °F (37 °C)   TempSrc: Oral Oral Oral Oral   SpO2: 97% 97% 96% 98%   Weight:       Height:           Body mass index is 27.43 kg/m².        CONSTITUTIONAL  General Appearance: unremarkable, age appropriate    MUSCULOSKELETAL  Muscle Strength and Tone:no tremor, no tic  Abnormal Involuntary Movements: No  Gait and Station: non-ataxic    PSYCHIATRIC   Level of Consciousness: awake and alert   Orientation: person, place, and situation  Grooming: Disheveled and Hospital  garb  Psychomotor Behavior: restless and fidgety   Speech: loud, rapid  Language: grossly intact  Mood: fine  Affect: Labile  Thought Process: less tangential  Associations: less loose   Thought Content: +delusions, denies SI, and denies HI  Perceptions: denies AH and denies  VH  Memory: Remote intact and Recent intact  Attention:Easily distracted  Fund of Knowledge: Vocabulary appropriate   Estimate if Intelligence:  Below average based on work/education history, vocabulary and mental status exam  Insight: limited awareness of illness  Judgment: limited        DIAGNOSTIC TESTING   Laboratory Results  No results found for this or any previous visit (from the past 24 hour(s)).           MEDICAL DECISION MAKING          ASSESSMENT         Schizoaffective disorder, bipolar type, MRE manic, severe  Medication non compliance  Agitation           PROBLEM LIST AND MANAGEMENT PLANS        Schizoaffective disorder, bipolar type, MRE manic, severe  - continue zyprexa 5 mg PO qhs  -increase to 7.5 mg PO qhs tonight  - pt counseled  - follow up with outpatient mental health providers after discharge for medication management and psychotherapy        Medication non compliance  - consider forced meds if pt refuses tx, h/o of forced med protocol in past per EMR  - monitor     Agitation   - continue zyprexa 10 mg PO/IM q 4 hours PRN for behavioral emergencies  - pt counseled  - monitor/precautions             Discussed diagnosis, risks and benefits of proposed treatment vs alternative treatments vs no treatment, potential side effects of these treatments and the inherent unpredictability of treatment. The patient expresses understanding of the above and displays the capacity to agree with this treatment given said understanding. Patient also agrees that, currently, the benefits outweigh the risks and would like to pursue/continue treatment at this time.    Any medications being used off-label were discussed with the patient  inclusive of the evidence base for the use of the medications and consent was obtained for the off-label use of the medication.       DISCHARGE PLANNING  Expected Disposition Plan: Home or Self Care      NEED FOR CONTINUED HOSPITALIZATION  Psychiatric illness continues to pose a potential threat to life or bodily function, of self or others, thereby requiring the need for continued inpatient psychiatric hospitalization: Yes, due to: gravely disabled, as evidenced by:  Ongoing concerns with perceptual aberrancy and paranoid persecutory delusions leading to potential harm of self or others.    Protective inpatient pyschiatric hospitalization required while a safe disposition plan is enacted: Yes    Patient stabilized and ready for discharge from inpatient psychiatric unit: No      The patient location is: [facility]    Visit type: audiovisual    Face to Face time with patient: 15  20 minutes of total time spent on the encounter, which includes face to face time and non-face to face time preparing to see the patient (eg, review of tests), Obtaining and/or reviewing separately obtained history, Documenting clinical information in the electronic or other health record, Independently interpreting results (not separately reported) and communicating results to the patient/family/caregiver, or Care coordination (not separately reported).     Each patient to whom he or she provides medical services by telemedicine is:  (1) informed of the relationship between the physician and patient and the respective role of any other health care provider with respect to management of the patient; and (2) notified that he or she may decline to receive medical services by telemedicine and may withdraw from such care at any time.          STAFF:   Bipin Mullins III, MD  Psychiatry

## 2023-09-13 NOTE — PLAN OF CARE
POC reviewed and is ongoing. Pt is calm and cooperative, self isolates to room and has very little interactions with peers or staff. Pt denies SI/HI/AVH. Will continue to monitor pt to ensure pt's health and safety while on the unit.

## 2023-09-14 PROCEDURE — 25000003 PHARM REV CODE 250: Performed by: STUDENT IN AN ORGANIZED HEALTH CARE EDUCATION/TRAINING PROGRAM

## 2023-09-14 PROCEDURE — 99233 SBSQ HOSP IP/OBS HIGH 50: CPT | Mod: ,,, | Performed by: STUDENT IN AN ORGANIZED HEALTH CARE EDUCATION/TRAINING PROGRAM

## 2023-09-14 PROCEDURE — 99233 PR SUBSEQUENT HOSPITAL CARE,LEVL III: ICD-10-PCS | Mod: ,,, | Performed by: STUDENT IN AN ORGANIZED HEALTH CARE EDUCATION/TRAINING PROGRAM

## 2023-09-14 PROCEDURE — 11400000 HC PSYCH PRIVATE ROOM

## 2023-09-14 RX ADMIN — OLANZAPINE 7.5 MG: 5 TABLET, FILM COATED ORAL at 08:09

## 2023-09-14 RX ADMIN — MUPIROCIN: 20 OINTMENT TOPICAL at 08:09

## 2023-09-14 NOTE — PLAN OF CARE
Patient had uneventful night. Patient remained in room throughout the night. Patient calm and cooperative. POC reviewed with patient. POC ongoing.

## 2023-09-14 NOTE — PLAN OF CARE
"POC reviewed and is ongoing. Pt is medication compliant, calm, and cooperative. Pt denies SI/HI/AVH. Pt was looking for bible and wanted to know "If the nuns had came to the unit yet.'' Pt was easily redirected. Pt self isolated to room and did not participate in group activities. Pt does not interact with peers or staff much. Will continue to monitor pt while on unit to ensure pt's health and safety.    "

## 2023-09-14 NOTE — PROGRESS NOTES
"PSYCHIATRY DAILY INPATIENT PROGRESS NOTE  SUBSEQUENT HOSPITAL VISIT    ENCOUNTER DATE: 9/14/2023  [facility] [place]    DATE OF ADMISSION: 9/9/2023  3:49 AM  LENGTH OF STAY: 5 days      CHIEF COMPLAINT   Tristin Saha is a 63 y.o. male, seen during daily mcdaniels rounds on the inpatient unit.  Tristin Saha presented with the chief complaint of psychosis      The patient was seen and examined. The chart was reviewed.     Reviewed notes from Rns and labs from the last 24 hours.    The patient's case was discussed with the treatment team/care providers today including Rns    Staff reports no behavioral or management issues.     The patient has been less non compliant with treatment.      Subjective 09/14/2023    "I am not going to group because they have AID's and Staph."    Pt remains delusional.     The patient denies any side effects to medications.        Interim/overnight events per report/notes:     Per SW:  Pt was encouraged to attend group but refused. Pt stated, " I don't have anything in common with those people, and most of them have diseases." Pt was offered 1:1 but refused.       Psychiatric ROS (observed, reported, or endorsed/denied):  Depressed mood - No  Interest/pleasure/anhedonia: No  Guilt/hopelessness/worthlessness - No  Changes in Sleep - No  Changes in Appetite - No  Changes in Concentration - No  Changes in Energy - No  PMA/R- No  Suicidal- active/passive ideations - No  Homicidal ideations: active/passive ideations - No    Hallucinations - less  Delusions - Continuing  Disorganized behavior - less  Disorganized speech - fluctuating  Negative symptoms - fluctuating    Elevated mood - No  Decreased need for sleep - less  Grandiosity - less  Racing thoughts - less  Impulsivity - less  Irritability- fluctuating  Increased energy - less  Distractibility - less  Increase in goal-directed activity or PMA- less    Symptoms of EDDI - less  Symptoms of Panic Disorder- No  Symptoms of PTSD - " No        Overall progress: Patient is showing mild improvement           Medical ROS  Review of Systems   Constitutional:  Negative for chills and fever.   HENT:  Negative for hearing loss.    Eyes:  Negative for blurred vision and double vision.   Respiratory:  Negative for shortness of breath.    Cardiovascular:  Negative for chest pain and palpitations.   Gastrointestinal:  Negative for constipation, diarrhea, nausea and vomiting.   Genitourinary:  Negative for dysuria.   Musculoskeletal:  Negative for back pain and neck pain.   Skin:  Negative for rash.   Neurological:  Negative for dizziness and headaches.   Endo/Heme/Allergies:  Negative for environmental allergies.         PAST MEDICAL HISTORY   Past Medical History:   Diagnosis Date    Depression            PSYCHOTROPIC MEDICATIONS   Scheduled Meds:   mupirocin   Nasal BID    OLANZapine  7.5 mg Oral QHS     Continuous Infusions:  PRN Meds:.acetaminophen, aluminum-magnesium hydroxide-simethicone, docusate sodium, hydrOXYzine pamoate, loperamide, nicotine, OLANZapine **AND** OLANZapine        EXAMINATION    VITALS   Vitals:    09/12/23 1918 09/13/23 0758 09/13/23 2310 09/14/23 0808   BP: 136/81 139/79 122/78 (!) 119/58   BP Location: Left arm Left arm Left arm Right arm   Patient Position: Sitting Sitting Sitting Lying   Pulse: 67 68 71 (!) 56   Resp: 16 18 18 18   Temp: 98.7 °F (37.1 °C) 98.6 °F (37 °C) 98.2 °F (36.8 °C) 97.7 °F (36.5 °C)   TempSrc: Oral Oral Oral Oral   SpO2: 96% 98% 96% 97%   Weight:       Height:           Body mass index is 27.43 kg/m².        CONSTITUTIONAL  General Appearance: unremarkable, age appropriate    MUSCULOSKELETAL  Muscle Strength and Tone:no tremor, no tic  Abnormal Involuntary Movements: No  Gait and Station: non-ataxic    PSYCHIATRIC   Level of Consciousness: awake and alert   Orientation: person, place, and situation  Grooming: Disheveled and Hospital garb  Psychomotor Behavior: normal, cooperative  Speech: normal tone,  normal rate, normal pitch, normal volume  Language: grossly intact  Mood: ok  Affect: Even  Thought Process: less tangential  Associations: less loose   Thought Content: +delusions, denies SI, and denies HI  Perceptions: denies AH and denies  VH  Memory: Remote intact and Recent intact  Attention:Easily distracted  Fund of Knowledge: Vocabulary appropriate   Estimate if Intelligence:  Below average based on work/education history, vocabulary and mental status exam  Insight: limited awareness of illness  Judgment: limited        DIAGNOSTIC TESTING   Laboratory Results  No results found for this or any previous visit (from the past 24 hour(s)).           MEDICAL DECISION MAKING          ASSESSMENT         Schizoaffective disorder, bipolar type, MRE manic, severe  Medication non compliance  Agitation           PROBLEM LIST AND MANAGEMENT PLANS        Schizoaffective disorder, bipolar type, MRE manic, severe  - continue zyprexa 5 mg PO qhs  -increase to 7.5 mg PO qhs   - pt counseled  - follow up with outpatient mental health providers after discharge for medication management and psychotherapy        Medication non compliance  - consider forced meds if pt refuses tx, h/o of forced med protocol in past per EMR  - monitor     Agitation   - continue zyprexa 10 mg PO/IM q 4 hours PRN for behavioral emergencies  - pt counseled  - monitor/precautions             Discussed diagnosis, risks and benefits of proposed treatment vs alternative treatments vs no treatment, potential side effects of these treatments and the inherent unpredictability of treatment. The patient expresses understanding of the above and displays the capacity to agree with this treatment given said understanding. Patient also agrees that, currently, the benefits outweigh the risks and would like to pursue/continue treatment at this time.    Any medications being used off-label were discussed with the patient inclusive of the evidence base for the use of  the medications and consent was obtained for the off-label use of the medication.       DISCHARGE PLANNING  Expected Disposition Plan: Home or Self Care      NEED FOR CONTINUED HOSPITALIZATION  Psychiatric illness continues to pose a potential threat to life or bodily function, of self or others, thereby requiring the need for continued inpatient psychiatric hospitalization: Yes, due to: gravely disabled, as evidenced by:  Ongoing concerns with perceptual aberrancy and paranoid persecutory delusions leading to potential harm of self or others.    Protective inpatient pyschiatric hospitalization required while a safe disposition plan is enacted: Yes    Patient stabilized and ready for discharge from inpatient psychiatric unit: No      The patient location is: [facility]    Visit type: audiovisual    Face to Face time with patient: 10  15 minutes of total time spent on the encounter, which includes face to face time and non-face to face time preparing to see the patient (eg, review of tests), Obtaining and/or reviewing separately obtained history, Documenting clinical information in the electronic or other health record, Independently interpreting results (not separately reported) and communicating results to the patient/family/caregiver, or Care coordination (not separately reported).     Each patient to whom he or she provides medical services by telemedicine is:  (1) informed of the relationship between the physician and patient and the respective role of any other health care provider with respect to management of the patient; and (2) notified that he or she may decline to receive medical services by telemedicine and may withdraw from such care at any time.          STAFF:   Bipin Mullins III, MD  Psychiatry

## 2023-09-14 NOTE — PLAN OF CARE
"Pt was encouraged to attend group but refused. Pt stated, " I don't have anything in common with those people, and most of them have diseases." Pt was offered 1:1 but refused. Staff will Continue to encourage pt to participate in process groups to verbalize feelings and develop healthy coping skills      "

## 2023-09-15 PROCEDURE — 25000003 PHARM REV CODE 250: Performed by: STUDENT IN AN ORGANIZED HEALTH CARE EDUCATION/TRAINING PROGRAM

## 2023-09-15 PROCEDURE — 11400000 HC PSYCH PRIVATE ROOM

## 2023-09-15 PROCEDURE — 90833 PR PSYCHOTHERAPY W/PATIENT W/E&M, 30 MIN (ADD ON): ICD-10-PCS | Mod: ,,, | Performed by: STUDENT IN AN ORGANIZED HEALTH CARE EDUCATION/TRAINING PROGRAM

## 2023-09-15 PROCEDURE — 90833 PSYTX W PT W E/M 30 MIN: CPT | Mod: ,,, | Performed by: STUDENT IN AN ORGANIZED HEALTH CARE EDUCATION/TRAINING PROGRAM

## 2023-09-15 PROCEDURE — 99233 SBSQ HOSP IP/OBS HIGH 50: CPT | Mod: ,,, | Performed by: STUDENT IN AN ORGANIZED HEALTH CARE EDUCATION/TRAINING PROGRAM

## 2023-09-15 PROCEDURE — 99233 PR SUBSEQUENT HOSPITAL CARE,LEVL III: ICD-10-PCS | Mod: ,,, | Performed by: STUDENT IN AN ORGANIZED HEALTH CARE EDUCATION/TRAINING PROGRAM

## 2023-09-15 RX ADMIN — OLANZAPINE 7.5 MG: 5 TABLET, FILM COATED ORAL at 08:09

## 2023-09-15 NOTE — NURSING
POC reviewed this shift and is on going.  Pt calm and cooperative on unit and compliant with medications with some prompting from staff.  Pt expressed concerns about medication dosage.  Pt examined medication closely, saw dosage imprinted on tablet and agreed to take it.

## 2023-09-15 NOTE — PLAN OF CARE
Pt was encouraged to attend group but refused. Pt was offered 1:1 but refused.Continue to encourage pt to participate in process groups to verbalize feelings and develop healthy coping skills.

## 2023-09-15 NOTE — PLAN OF CARE
POC reviewed this shift and is on going. Patient is withdrawn, depressed, anxious, sleeping, showing pressured speech, and paranoid.does not  Endorse Suicidal Ideation, Homicidal Ideation, Auditory Hallucinations, and Visual Hallucinations. Have been isolating in room most of the day,when approached,minimal confusion apparent, Pt continues to be medication compliant and staff will continue to monitor pt closely while on the unit.continue care as per treatment plan.

## 2023-09-15 NOTE — PROGRESS NOTES
"PSYCHIATRY DAILY INPATIENT PROGRESS NOTE  SUBSEQUENT HOSPITAL VISIT    ENCOUNTER DATE: 9/15/2023  SITE: [facility]    DATE OF ADMISSION: 2023  3:49 AM  LENGTH OF STAY: 6 days      CHIEF COMPLAINT   Tristin Saha is a 63 y.o. male, seen during daily mcdaniels rounds on the inpatient unit.  Tristin Saha presented with the chief complaint of psychosis      The patient was seen and examined. The chart was reviewed.     Reviewed notes from Rns and labs from the last 24 hours.    The patient's case was discussed with the treatment team/care providers today including Rns and LPN    Staff reports no behavioral or management issues.     The patient has been less non compliant with treatment.      Subjective 09/15/2023      Patient easily agitated when discussing delusions, "YOU ARE JUDAS AND A COWARD!!" Refuses any changes to medication regime. Continues to focus on "I  six times, I'm hoping I have eternal rest... 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 I don't want to do therapy with your AID's carrying, syphilis carrying staff."    Remains delusional and has no insight.     The patient denies any side effects to medications.        Interim/overnight events per report/notes:     Per LPN:  Pt was looking for bible and wanted to know "If the nuns had came to the unit yet.'' Pt was easily redirected. Pt self isolated to room and did not participate in group activities. Pt does not interact with peers or staff much.            Psychiatric ROS (observed, reported, or endorsed/denied):  Depressed mood - No  Interest/pleasure/anhedonia: No  Guilt/hopelessness/worthlessness - No  Changes in Sleep - No  Changes in Appetite - No  Changes in Concentration - No  Changes in Energy - No  PMA/R- No  Suicidal- active/passive ideations - No  Homicidal ideations: active/passive ideations - No    Hallucinations - " less  Delusions - Continuing  Disorganized behavior - less  Disorganized speech - Continuing  Negative symptoms - Continuing    Elevated mood - No  Decreased need for sleep - less  Grandiosity - less  Racing thoughts - less  Impulsivity - less  Irritability- Continuing  Increased energy - less  Distractibility - less  Increase in goal-directed activity or PMA- less    Symptoms of EDDI - less  Symptoms of Panic Disorder- No  Symptoms of PTSD - No        Psychotherapy:  Target symptoms: psychosis  Why chosen therapy is appropriate versus another modality: relevant to diagnosis  Outcome monitoring methods: self-report  Therapeutic intervention type: supportive psychotherapy  Topics discussed/themes: symptom recognition  The patient's response to the intervention is hostile. The patient's progress toward treatment goals is not progressing.   Duration of intervention: 16 minutes.        Overall progress: Patient is showing mild improvement           Medical ROS  Review of Systems   Constitutional:  Negative for chills and fever.   HENT:  Negative for hearing loss.    Eyes:  Negative for blurred vision and double vision.   Respiratory:  Negative for shortness of breath.    Cardiovascular:  Negative for chest pain and palpitations.   Gastrointestinal:  Negative for constipation, diarrhea, nausea and vomiting.   Genitourinary:  Negative for dysuria.   Musculoskeletal:  Negative for back pain and neck pain.   Skin:  Negative for rash.   Neurological:  Negative for dizziness and headaches.   Endo/Heme/Allergies:  Negative for environmental allergies.         PAST MEDICAL HISTORY   Past Medical History:   Diagnosis Date    Depression            PSYCHOTROPIC MEDICATIONS   Scheduled Meds:   OLANZapine  7.5 mg Oral QHS     Continuous Infusions:  PRN Meds:.acetaminophen, aluminum-magnesium hydroxide-simethicone, docusate sodium, hydrOXYzine pamoate, loperamide, nicotine, OLANZapine **AND** OLANZapine        EXAMINATION    VITALS    Vitals:    09/13/23 2310 09/14/23 0808 09/14/23 1909 09/15/23 0807   BP: 122/78 (!) 119/58 128/81 118/73   BP Location: Left arm Right arm Left arm Left arm   Patient Position: Sitting Lying Sitting Sitting   Pulse: 71 (!) 56 68 66   Resp: 18 18 20 20   Temp: 98.2 °F (36.8 °C) 97.7 °F (36.5 °C) 97.8 °F (36.6 °C) 97.8 °F (36.6 °C)   TempSrc: Oral Oral Oral Oral   SpO2: 96% 97% 95% 96%   Weight:       Height:           Body mass index is 27.43 kg/m².          CONSTITUTIONAL  General Appearance: unremarkable, age appropriate    MUSCULOSKELETAL  Muscle Strength and Tone:no tremor, no tic  Abnormal Involuntary Movements: No  Gait and Station: non-ataxic    PSYCHIATRIC   Level of Consciousness: awake and alert   Orientation: person, place, and situation  Grooming: Disheveled and Hospital garb  Psychomotor Behavior: normal, cooperative  Speech: loud, profane, rapid  Language: grossly intact  Mood: fine  Affect: Labile  Thought Process: +tangential  Associations: loose   Thought Content: +delusions, denies SI, and denies HI  Perceptions: denies AH and denies  VH  Memory: Remote intact and Recent intact  Attention:Easily distracted  Fund of Knowledge: Vocabulary appropriate   Estimate if Intelligence:  Below average based on work/education history, vocabulary and mental status exam  Insight: limited awareness of illness  Judgment: limited        DIAGNOSTIC TESTING   Laboratory Results  No results found for this or any previous visit (from the past 24 hour(s)).           MEDICAL DECISION MAKING          ASSESSMENT         Schizoaffective disorder, bipolar type, MRE manic, severe  Medication non compliance  Agitation           PROBLEM LIST AND MANAGEMENT PLANS          Schizoaffective disorder, bipolar type, MRE manic, severe  - continue zyprexa 5 mg PO qhs  -increase to 7.5 mg PO qhs   (plan to increase to 10 mg however pt is refusing)  - pt counseled  - follow up with outpatient mental health providers after discharge for  medication management and psychotherapy        Medication non compliance  - consider forced meds if pt refuses tx, h/o of forced med protocol in past per EMR  - monitor     Agitation   - continue zyprexa 10 mg PO/IM q 4 hours PRN for behavioral emergencies  - pt counseled  - monitor/precautions           Discussed diagnosis, risks and benefits of proposed treatment vs alternative treatments vs no treatment, potential side effects of these treatments and the inherent unpredictability of treatment. The patient expresses understanding of the above and displays the capacity to agree with this treatment given said understanding. Patient also agrees that, currently, the benefits outweigh the risks and would like to pursue/continue treatment at this time.    Any medications being used off-label were discussed with the patient inclusive of the evidence base for the use of the medications and consent was obtained for the off-label use of the medication.       DISCHARGE PLANNING  Expected Disposition Plan: Home or Self Care      NEED FOR CONTINUED HOSPITALIZATION  Psychiatric illness continues to pose a potential threat to life or bodily function, of self or others, thereby requiring the need for continued inpatient psychiatric hospitalization: Yes, due to: gravely disabled, as evidenced by:  Ongoing concerns with perceptual aberrancy and paranoid persecutory delusions leading to potential harm of self or others.    Protective inpatient pyschiatric hospitalization required while a safe disposition plan is enacted: Yes    Patient stabilized and ready for discharge from inpatient psychiatric unit: No      The patient location is: [facility]    Visit type: audiovisual    Face to Face time with patient: 18  20 minutes of total time spent on the encounter, which includes face to face time and non-face to face time preparing to see the patient (eg, review of tests), Obtaining and/or reviewing separately obtained history,  Documenting clinical information in the electronic or other health record, Independently interpreting results (not separately reported) and communicating results to the patient/family/caregiver, or Care coordination (not separately reported).     Each patient to whom he or she provides medical services by telemedicine is:  (1) informed of the relationship between the physician and patient and the respective role of any other health care provider with respect to management of the patient; and (2) notified that he or she may decline to receive medical services by telemedicine and may withdraw from such care at any time.          STAFF:   Bipin Mullins III, MD  Psychiatry

## 2023-09-16 PROCEDURE — 25000003 PHARM REV CODE 250: Performed by: STUDENT IN AN ORGANIZED HEALTH CARE EDUCATION/TRAINING PROGRAM

## 2023-09-16 PROCEDURE — 11400000 HC PSYCH PRIVATE ROOM

## 2023-09-16 PROCEDURE — 99232 PR SUBSEQUENT HOSPITAL CARE,LEVL II: ICD-10-PCS | Mod: ,,, | Performed by: PSYCHIATRY & NEUROLOGY

## 2023-09-16 PROCEDURE — 99232 SBSQ HOSP IP/OBS MODERATE 35: CPT | Mod: ,,, | Performed by: PSYCHIATRY & NEUROLOGY

## 2023-09-16 RX ADMIN — OLANZAPINE 7.5 MG: 5 TABLET, FILM COATED ORAL at 08:09

## 2023-09-16 NOTE — PLAN OF CARE
POC reviewed this shift and is ongoing.  Pt paranoid about being out on the unit with peers d/t he thinks there are many dz out there.  Pt came out later to get a snack and returned to his room. Pt depress and anxious.

## 2023-09-16 NOTE — PLAN OF CARE
POC reviewed this shift and is on going. Patient is withdrawn, calm, cooperative, quiet, and sleeping. Denies Suicidal Ideation, Homicidal Ideation, Auditory Hallucinations, Visual Hallucinations, Tactile Hallucinations, Gustatory Hallucinations, and Delusions. Patient has been sleeping in his room most of the day. Patient has been out on the floor more today than the previous days. Pt continues to be medication compliant and staff will continue to monitor pt closely while on the unit.

## 2023-09-16 NOTE — PROGRESS NOTES
"PSYCHIATRY DAILY INPATIENT PROGRESS NOTE  SUBSEQUENT HOSPITAL VISIT    ENCOUNTER DATE: 9/16/2023  SITE: [facility]    DATE OF ADMISSION: 9/9/2023  3:49 AM  LENGTH OF STAY: 7 days      CHIEF COMPLAINT   Tristin Saha is a 63 y.o. male, seen during daily mcdaniels rounds on the inpatient unit.  Tristin Saha presented with the chief complaint of psychosis      The patient was seen and examined. The chart was reviewed.     Reviewed notes from Rns and labs from the last 24 hours.    The patient's case was discussed with the treatment team/care providers today including Rns and LPN    Staff reports no behavioral or management issues.     The patient has been less non compliant with treatment.      Subjective 09/16/2023    Pt states he is doing a little better today. States he slept better last night (though staff reports do not corroborate this).  Talks about prayer as the reason he feels better.     "Im not answering any questions."    Remains delusional and has no insight.     The patient denies any side effects to medications.        Interim/overnight events per report/notes:     Per RN:  Pt to nurses's station with request for a carton of milk.       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  He also states that he worked with the [employer] and [employer] so people in the  want to kill him.            Psychiatric ROS (observed, reported, or endorsed/denied):  Depressed mood - No  Interest/pleasure/anhedonia: No  Guilt/hopelessness/worthlessness - No  Changes in Sleep - No  Changes in Appetite - No  Changes in Concentration - No  Changes in Energy - No  PMA/R- No  Suicidal- active/passive ideations - No  Homicidal ideations: active/passive ideations - No    Hallucinations - less  Delusions - Continuing  Disorganized behavior - less  Disorganized speech - Continuing  Negative symptoms - Continuing    Elevated mood - No  Decreased need for sleep - " less  Grandiosity - less  Racing thoughts - less  Impulsivity - less  Irritability- Continuing  Increased energy - less  Distractibility - less  Increase in goal-directed activity or PMA- less    Symptoms of EDDI - less  Symptoms of Panic Disorder- No  Symptoms of PTSD - No        Psychotherapy:  Target symptoms: psychosis  Why chosen therapy is appropriate versus another modality: relevant to diagnosis  Outcome monitoring methods: self-report  Therapeutic intervention type: supportive psychotherapy  Topics discussed/themes: symptom recognition  The patient's response to the intervention is hostile. The patient's progress toward treatment goals is not progressing.   Duration of intervention: 16 minutes.        Overall progress: Patient is showing mild improvement           Medical ROS  Review of Systems   Constitutional:  Negative for chills and fever.   HENT:  Negative for hearing loss.    Eyes:  Negative for blurred vision and double vision.   Respiratory:  Negative for shortness of breath.    Cardiovascular:  Negative for chest pain and palpitations.   Gastrointestinal:  Negative for constipation, diarrhea, nausea and vomiting.   Genitourinary:  Negative for dysuria.   Musculoskeletal:  Negative for back pain and neck pain.   Skin:  Negative for rash.   Neurological:  Negative for dizziness and headaches.   Endo/Heme/Allergies:  Negative for environmental allergies.         PAST MEDICAL HISTORY   Past Medical History:   Diagnosis Date    Depression            PSYCHOTROPIC MEDICATIONS   Scheduled Meds:   OLANZapine  7.5 mg Oral QHS     Continuous Infusions:  PRN Meds:.acetaminophen, aluminum-magnesium hydroxide-simethicone, docusate sodium, hydrOXYzine pamoate, loperamide, nicotine, OLANZapine **AND** OLANZapine        EXAMINATION    VITALS   Vitals:    09/14/23 1909 09/15/23 0807 09/15/23 1913 09/16/23 0750   BP: 128/81 118/73 120/60 (!) 85/54   BP Location: Left arm Left arm Left arm Left arm   Patient Position:  Sitting Sitting Sitting Lying   Pulse: 68 66 75 (!) 58   Resp: 20 20 16 18   Temp: 97.8 °F (36.6 °C) 97.8 °F (36.6 °C) 97.7 °F (36.5 °C) 98 °F (36.7 °C)   TempSrc: Oral Oral Oral Oral   SpO2: 95% 96% 97% 96%   Weight:       Height:           Body mass index is 27.43 kg/m².          CONSTITUTIONAL  General Appearance: unremarkable, age appropriate    MUSCULOSKELETAL  Muscle Strength and Tone:no tremor, no tic  Abnormal Involuntary Movements: No  Gait and Station: non-ataxic    PSYCHIATRIC   Level of Consciousness: awake and alert   Orientation: person, place, and situation  Grooming: Disheveled and Hospital garb  Psychomotor Behavior: normal, cooperative  Speech: loud, profane, rapid  Language: grossly intact  Mood: fine  Affect: Labile  Thought Process: +tangential  Associations: loose   Thought Content: +delusions, denies SI, and denies HI  Perceptions: denies AH and denies  VH  Memory: Remote intact and Recent intact  Attention:Easily distracted  Fund of Knowledge: Vocabulary appropriate   Estimate if Intelligence:  Below average based on work/education history, vocabulary and mental status exam  Insight: limited awareness of illness  Judgment: limited        DIAGNOSTIC TESTING   Laboratory Results  No results found for this or any previous visit (from the past 24 hour(s)).           MEDICAL DECISION MAKING          ASSESSMENT         Schizoaffective disorder, bipolar type, MRE manic, severe  Medication non compliance  Agitation           PROBLEM LIST AND MANAGEMENT PLANS        Schizoaffective disorder, bipolar type, MRE manic, severe  - continue zyprexa 5 mg PO qhs  -increase to 7.5 mg PO qhs   (plan to increase to 10 mg however pt is refusing)  - pt counseled  - follow up with outpatient mental health providers after discharge for medication management and psychotherapy        Medication non compliance  - consider forced meds if pt refuses tx, h/o of forced med protocol in past per EMR  - monitor     Agitation    - continue zyprexa 10 mg PO/IM q 4 hours PRN for behavioral emergencies  - pt counseled  - monitor/precautions           Discussed diagnosis, risks and benefits of proposed treatment vs alternative treatments vs no treatment, potential side effects of these treatments and the inherent unpredictability of treatment. The patient expresses understanding of the above and displays the capacity to agree with this treatment given said understanding. Patient also agrees that, currently, the benefits outweigh the risks and would like to pursue/continue treatment at this time.    Any medications being used off-label were discussed with the patient inclusive of the evidence base for the use of the medications and consent was obtained for the off-label use of the medication.       DISCHARGE PLANNING  Expected Disposition Plan: Home or Self Care      NEED FOR CONTINUED HOSPITALIZATION  Psychiatric illness continues to pose a potential threat to life or bodily function, of self or others, thereby requiring the need for continued inpatient psychiatric hospitalization: Yes, due to: gravely disabled, as evidenced by:  Ongoing concerns with perceptual aberrancy and paranoid persecutory delusions leading to potential harm of self or others.    Protective inpatient pyschiatric hospitalization required while a safe disposition plan is enacted: Yes    Patient stabilized and ready for discharge from inpatient psychiatric unit: No      The patient location is: [facility]    Visit type: audiovisual    Face to Face time with patient: 18  20 minutes of total time spent on the encounter, which includes face to face time and non-face to face time preparing to see the patient (eg, review of tests), Obtaining and/or reviewing separately obtained history, Documenting clinical information in the electronic or other health record, Independently interpreting results (not separately reported) and communicating results to the  patient/family/caregiver, or Care coordination (not separately reported).     Each patient to whom he or she provides medical services by telemedicine is:  (1) informed of the relationship between the physician and patient and the respective role of any other health care provider with respect to management of the patient; and (2) notified that he or she may decline to receive medical services by telemedicine and may withdraw from such care at any time.          STAFF:   Shawn Dejesus MD  Psychiatry

## 2023-09-16 NOTE — NURSING
Pt to nurses's station with request for a carton of milk.      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  He also states that he worked with the [employer] and [employer] so people in the  want to kill him.

## 2023-09-17 PROCEDURE — 25000003 PHARM REV CODE 250: Performed by: PSYCHIATRY & NEUROLOGY

## 2023-09-17 PROCEDURE — 11400000 HC PSYCH PRIVATE ROOM

## 2023-09-17 PROCEDURE — 99232 PR SUBSEQUENT HOSPITAL CARE,LEVL II: ICD-10-PCS | Mod: ,,, | Performed by: PSYCHIATRY & NEUROLOGY

## 2023-09-17 PROCEDURE — 99232 SBSQ HOSP IP/OBS MODERATE 35: CPT | Mod: ,,, | Performed by: PSYCHIATRY & NEUROLOGY

## 2023-09-17 PROCEDURE — 25000003 PHARM REV CODE 250: Performed by: INTERNAL MEDICINE

## 2023-09-17 RX ORDER — OLANZAPINE 10 MG/1
10 TABLET ORAL NIGHTLY
Status: DISCONTINUED | OUTPATIENT
Start: 2023-09-17 | End: 2023-09-18

## 2023-09-17 RX ADMIN — HYDROXYZINE PAMOATE 50 MG: 50 CAPSULE ORAL at 10:09

## 2023-09-17 RX ADMIN — OLANZAPINE 10 MG: 10 TABLET, FILM COATED ORAL at 08:09

## 2023-09-17 NOTE — PROGRESS NOTES
"PSYCHIATRY DAILY INPATIENT PROGRESS NOTE  SUBSEQUENT HOSPITAL VISIT    ENCOUNTER DATE: 2023  SITE: [facility]    DATE OF ADMISSION: 2023  3:49 AM  LENGTH OF STAY: 8 days      CHIEF COMPLAINT   Tristin Saha is a 63 y.o. male, seen during daily mcdaniels rounds on the inpatient unit.  Tristin Saha presented with the chief complaint of psychosis      The patient was seen and examined. The chart was reviewed.     Reviewed notes from Rns and labs from the last 24 hours.    The patient's case was discussed with the treatment team/care providers today including Rns and LPN    Staff reports no behavioral or management issues.     The patient has been less non compliant with treatment.      Subjective 2023    Pt states he is doing "very well." States he slept well last night. Mood is alright.     Pt then starts rambling about the number 17 and Jessica being president. This is difficult to understand and follow.     PT reports he is tolerating medication without issue. "Im not seeing things or hearing things. I do not feel like hurting myself or others, and it is  and jessica is the president."     Attempted to discuss titration of the Zyprexa and pt starts talking about dying 6 times. He becomes agitated and states that he knows his body and he will die again. "Susu  6 times and come back. Im already bedridden as it is. 7.5 is all I need."     "This is fraud, what's going on. You haven't listened to my heart. You haven't checked my reflexes. You're making a whole lot of money off my medicare and my medicaid, and I know that I have  6 times and come back. I was willing to take 5mg I was even wiling to take 7.5mg. And if a psychiatrist wants to give me injections that has AIDS, HIV, Syphilis then so be it."     Pt remains floridly psychotic.     Remains delusional and has no insight.     The patient denies any side effects to medications.        Interim/overnight events per " report/notes:     Per RN:  Pt to nurses's station with request for a carton of milk.       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  He also states that he worked with the [employer] and [employer] so people in the  want to kill him.        Psychiatric ROS (observed, reported, or endorsed/denied):  Depressed mood - No  Interest/pleasure/anhedonia: No  Guilt/hopelessness/worthlessness - No  Changes in Sleep - No  Changes in Appetite - No  Changes in Concentration - No  Changes in Energy - No  PMA/R- No  Suicidal- active/passive ideations - No  Homicidal ideations: active/passive ideations - No    Hallucinations - less  Delusions - Continuing  Disorganized behavior - less  Disorganized speech - Continuing  Negative symptoms - Continuing    Elevated mood - No  Decreased need for sleep - less  Grandiosity - less  Racing thoughts - less  Impulsivity - less  Irritability- Continuing  Increased energy - less  Distractibility - less  Increase in goal-directed activity or PMA- less    Symptoms of EDDI - less  Symptoms of Panic Disorder- No  Symptoms of PTSD - No        Psychotherapy:  Target symptoms: psychosis  Why chosen therapy is appropriate versus another modality: relevant to diagnosis  Outcome monitoring methods: self-report  Therapeutic intervention type: supportive psychotherapy  Topics discussed/themes: symptom recognition  The patient's response to the intervention is hostile. The patient's progress toward treatment goals is not progressing.   Duration of intervention: 16 minutes.        Overall progress: Patient is showing mild improvement           Medical ROS  Review of Systems   Constitutional:  Negative for chills and fever.   HENT:  Negative for hearing loss.    Eyes:  Negative for blurred vision and double vision.   Respiratory:  Negative for shortness of breath.    Cardiovascular:  Negative for chest pain and palpitations.   Gastrointestinal:  Negative  for constipation, diarrhea, nausea and vomiting.   Genitourinary:  Negative for dysuria.   Musculoskeletal:  Negative for back pain and neck pain.   Skin:  Negative for rash.   Neurological:  Negative for dizziness and headaches.   Endo/Heme/Allergies:  Negative for environmental allergies.         PAST MEDICAL HISTORY   Past Medical History:   Diagnosis Date    Depression            PSYCHOTROPIC MEDICATIONS   Scheduled Meds:   OLANZapine  7.5 mg Oral QHS     Continuous Infusions:  PRN Meds:.acetaminophen, aluminum-magnesium hydroxide-simethicone, docusate sodium, hydrOXYzine pamoate, loperamide, nicotine, OLANZapine **AND** OLANZapine        EXAMINATION    VITALS   Vitals:    09/15/23 1913 09/16/23 0750 09/16/23 1932 09/17/23 0735   BP: 120/60 (!) 85/54 133/75 109/63   BP Location: Left arm Left arm Left arm Right arm   Patient Position: Sitting Lying Sitting    Pulse: 75 (!) 58 63 (!) 52   Resp: 16 18 16 20   Temp: 97.7 °F (36.5 °C) 98 °F (36.7 °C) 97.2 °F (36.2 °C) 97.8 °F (36.6 °C)   TempSrc: Oral Oral Oral Oral   SpO2: 97% 96% 97% 97%   Weight:       Height:           Body mass index is 27.43 kg/m².          CONSTITUTIONAL  General Appearance: unremarkable, age appropriate    MUSCULOSKELETAL  Muscle Strength and Tone:no tremor, no tic  Abnormal Involuntary Movements: No  Gait and Station: non-ataxic    PSYCHIATRIC   Level of Consciousness: awake and alert   Orientation: person, place, and situation  Grooming: Disheveled and Hospital garb  Psychomotor Behavior: normal, cooperative  Speech: loud, profane, rapid  Language: grossly intact  Mood: fine  Affect: Labile  Thought Process: +tangential  Associations: loose   Thought Content: +delusions, denies SI, and denies HI  Perceptions: denies AH and denies  VH  Memory: Remote intact and Recent intact  Attention:Easily distracted  Fund of Knowledge: Vocabulary appropriate   Estimate if Intelligence:  Below average based on work/education history, vocabulary and  mental status exam  Insight: limited awareness of illness  Judgment: limited        DIAGNOSTIC TESTING   Laboratory Results  No results found for this or any previous visit (from the past 24 hour(s)).           MEDICAL DECISION MAKING          ASSESSMENT         Schizoaffective disorder, bipolar type, MRE manic, severe  Medication non compliance  Agitation           PROBLEM LIST AND MANAGEMENT PLANS        Schizoaffective disorder, bipolar type, MRE manic, severe  - continue zyprexa 5 mg PO qhs  -increase to 7.5 mg PO qhs  --> Titrate to 10mg PO nightly on 9/17  - pt counseled  - follow up with outpatient mental health providers after discharge for medication management and psychotherapy        Medication non compliance  - consider forced meds if pt refuses tx, h/o of forced med protocol in past per EMR  - monitor     Agitation   - continue zyprexa 10 mg PO/IM q 4 hours PRN for behavioral emergencies  - pt counseled  - monitor/precautions           Discussed diagnosis, risks and benefits of proposed treatment vs alternative treatments vs no treatment, potential side effects of these treatments and the inherent unpredictability of treatment. The patient expresses understanding of the above and displays the capacity to agree with this treatment given said understanding. Patient also agrees that, currently, the benefits outweigh the risks and would like to pursue/continue treatment at this time.    Any medications being used off-label were discussed with the patient inclusive of the evidence base for the use of the medications and consent was obtained for the off-label use of the medication.       DISCHARGE PLANNING  Expected Disposition Plan: Home or Self Care      NEED FOR CONTINUED HOSPITALIZATION  Psychiatric illness continues to pose a potential threat to life or bodily function, of self or others, thereby requiring the need for continued inpatient psychiatric hospitalization: Yes, due to: gravely disabled, as  evidenced by:  Ongoing concerns with perceptual aberrancy and paranoid persecutory delusions leading to potential harm of self or others.    Protective inpatient pyschiatric hospitalization required while a safe disposition plan is enacted: Yes    Patient stabilized and ready for discharge from inpatient psychiatric unit: No      The patient location is: [facility]    Visit type: audiovisual    Face to Face time with patient: 18  20 minutes of total time spent on the encounter, which includes face to face time and non-face to face time preparing to see the patient (eg, review of tests), Obtaining and/or reviewing separately obtained history, Documenting clinical information in the electronic or other health record, Independently interpreting results (not separately reported) and communicating results to the patient/family/caregiver, or Care coordination (not separately reported).     Each patient to whom he or she provides medical services by telemedicine is:  (1) informed of the relationship between the physician and patient and the respective role of any other health care provider with respect to management of the patient; and (2) notified that he or she may decline to receive medical services by telemedicine and may withdraw from such care at any time.          STAFF:   Shawn Dejesus MD  Psychiatry

## 2023-09-17 NOTE — NURSING
"POC reviewed this shift and is on going.  Pt calm and cooperative on unit and compliant with medications.  Pt remains very anxious about medication again stating that 'I've  6 times and come back.  Most members of my family have."  Pt denies SI/HI/AVH.  "

## 2023-09-18 PROCEDURE — 25000003 PHARM REV CODE 250: Performed by: PSYCHIATRY & NEUROLOGY

## 2023-09-18 PROCEDURE — 90833 PSYTX W PT W E/M 30 MIN: CPT | Mod: ,,, | Performed by: PSYCHIATRY & NEUROLOGY

## 2023-09-18 PROCEDURE — 99233 SBSQ HOSP IP/OBS HIGH 50: CPT | Mod: ,,, | Performed by: PSYCHIATRY & NEUROLOGY

## 2023-09-18 PROCEDURE — 11400000 HC PSYCH PRIVATE ROOM

## 2023-09-18 PROCEDURE — S0166 INJ OLANZAPINE 2.5MG: HCPCS | Performed by: INTERNAL MEDICINE

## 2023-09-18 PROCEDURE — 90833 PR PSYCHOTHERAPY W/PATIENT W/E&M, 30 MIN (ADD ON): ICD-10-PCS | Mod: ,,, | Performed by: PSYCHIATRY & NEUROLOGY

## 2023-09-18 PROCEDURE — 99233 PR SUBSEQUENT HOSPITAL CARE,LEVL III: ICD-10-PCS | Mod: ,,, | Performed by: PSYCHIATRY & NEUROLOGY

## 2023-09-18 PROCEDURE — 25000003 PHARM REV CODE 250: Performed by: INTERNAL MEDICINE

## 2023-09-18 RX ORDER — HYDROXYZINE PAMOATE 50 MG/1
50 CAPSULE ORAL EVERY 6 HOURS PRN
Status: DISCONTINUED | OUTPATIENT
Start: 2023-09-18 | End: 2023-09-20 | Stop reason: HOSPADM

## 2023-09-18 RX ADMIN — OLANZAPINE 15 MG: 5 TABLET, FILM COATED ORAL at 08:09

## 2023-09-18 RX ADMIN — OLANZAPINE 10 MG: 10 INJECTION, POWDER, FOR SOLUTION INTRAMUSCULAR at 10:09

## 2023-09-18 RX ADMIN — HYDROXYZINE PAMOATE 50 MG: 50 CAPSULE ORAL at 08:09

## 2023-09-18 NOTE — PLAN OF CARE
POC reviewed this shift and is ongoing.  Pt is + A/hallucinations. Pt is angry d/t unable to get a snack for the second and third time. Pt is easily redirected back to his room. He is isolated in his room and comes to the unit for meds, snack and vs. No ss of distress. Pt denies SI, HI, and will continue to monitor for changes and safety.

## 2023-09-18 NOTE — PLAN OF CARE
Pt was encouraged to attend group but refused. Pt was offered 1:1 but refused.  Continue to encourage pt to participate in process groups to verbalize feelings and develop healthy coping skills.

## 2023-09-18 NOTE — NURSING
Pt compliant with medication, but requested 'a whole bottle of zyprexa'.  Pt stated that he just wanted to end it all because everyone would be better off without him because he's old and cannot work anymore.

## 2023-09-18 NOTE — NURSING
"Patient in ay[place] at this time pacing.  Pt has been out of his room off and on this evening.  Pt spent the majority of the morning in the bedroom.  Pt continues to be preoccupied with going home.  Pt states he does not want to take the increase of his olanzapine because he stated " Look here ma'am.  If I take those for meds I'll be laying in a coffin tonight."  Pt encouraged to be med compliant by this writer.  Pt denies a v hallucination.  Denies si, hi.  Gravely disabled.  Appetite adequate. Taking meds with no side effects voiced. Pt instructed to call for any needs or concerns at all. Verbalized understanding. Will cont to monitor for safety.   "

## 2023-09-18 NOTE — PROGRESS NOTES
"PSYCHIATRY DAILY INPATIENT PROGRESS NOTE  SUBSEQUENT HOSPITAL VISIT    ENCOUNTER DATE: 2023  SITE: [facility]    DATE OF ADMISSION: 2023  3:49 AM  LENGTH OF STAY: 9 days      CHIEF COMPLAINT   Tristin Saha is a 63 y.o. male, seen during daily mcdaniels rounds on the inpatient unit.  Tristin Saha presented with the chief complaint of psychosis      The patient was seen and examined. The chart was reviewed.     Reviewed notes from Rns and MD and labs from the last 24 hours.    The patient's case was discussed with the treatment team/care providers today including Rns, CTRS, NP, and SW    Staff reports no behavioral or management issues.     The patient has been less non compliant with treatment.      Subjective 2023    Per yesterday's notes: Pt states he is doing "very well." States he slept well last night. Mood is alright.   Pt then starts rambling about the number 17 and Jessica being president. This is difficult to understand and follow.   PT reports he is tolerating medication without issue. "Im not seeing things or hearing things. I do not feel like hurting myself or others, and it is  and jessica is the president."   Attempted to discuss titration of the Zyprexa and pt starts talking about dying 6 times. He becomes agitated and states that he knows his body and he will die again. "Susu  6 times and come back. Im already bedridden as it is. 7.5 is all I need."   "This is fraud, what's going on. You haven't listened to my heart. You haven't checked my reflexes. You're making a whole lot of money off my medicare and my medicaid, and I know that I have  6 times and come back. I was willing to take 5mg I was even wiling to take 7.5mg. And if a psychiatrist wants to give me injections that has AIDS, HIV, Syphilis then so be it."   Pt remains floridly psychotic.   Remains delusional and has no insight.       Today, he reports that he is ok. He remains easily agitated, " especially when discussing his medications.  -mood/psychotic symptoms persist and remain severely fx/bx impairing. Symptoms continue to require inpatient stabilization.   Symptoms continue but are making slow progress   He discussed having  numerous times since being here.   He is inappropriately focused on discharge.     The patient denies any side effects to medications.          Psychiatric ROS (observed, reported, or endorsed/denied):  Depressed mood - No  Interest/pleasure/anhedonia: No  Guilt/hopelessness/worthlessness - No  Changes in Sleep - No  Changes in Appetite - No  Changes in Concentration - No  Changes in Energy - No  PMA/R- No  Suicidal- active/passive ideations - No  Homicidal ideations: active/passive ideations - No    Hallucinations - less  Delusions - less  Disorganized behavior - less  Disorganized speech - less  Negative symptoms - less    Elevated mood - No  Decreased need for sleep - less  Grandiosity - less  Racing thoughts - less  Impulsivity - less  Irritability- Continuing  Increased energy - less  Distractibility - less  Increase in goal-directed activity or PMA- less    Symptoms of EDDI - less  Symptoms of Panic Disorder- No  Symptoms of PTSD - No        Psychotherapy:  Target symptoms: psychosis  Why chosen therapy is appropriate versus another modality: relevant to diagnosis  Outcome monitoring methods: self-report  Therapeutic intervention type: supportive psychotherapy  Topics discussed/themes: symptom recognition  The patient's response to the intervention is hostile. The patient's progress toward treatment goals is not progressing.   Duration of intervention: 16 minutes.        Overall progress: Patient is showing mild improvement           Medical ROS  Review of Systems   Constitutional:  Negative for chills and fever.   HENT:  Negative for hearing loss.    Eyes:  Negative for blurred vision and double vision.   Respiratory:  Negative for shortness of breath.     Cardiovascular:  Negative for chest pain and palpitations.   Gastrointestinal:  Negative for constipation, diarrhea, nausea and vomiting.   Genitourinary:  Negative for dysuria.   Musculoskeletal:  Negative for back pain and neck pain.   Skin:  Negative for rash.   Neurological:  Negative for dizziness and headaches.   Endo/Heme/Allergies:  Negative for environmental allergies.         PAST MEDICAL HISTORY   Past Medical History:   Diagnosis Date    Depression            PSYCHOTROPIC MEDICATIONS   Scheduled Meds:   OLANZapine  10 mg Oral QHS     Continuous Infusions:  PRN Meds:.acetaminophen, aluminum-magnesium hydroxide-simethicone, docusate sodium, hydrOXYzine pamoate, loperamide, nicotine, OLANZapine **AND** OLANZapine        EXAMINATION    VITALS   Vitals:    09/16/23 1932 09/17/23 0735 09/17/23 1902 09/18/23 0814   BP: 133/75 109/63 127/72 (!) 107/52   BP Location: Left arm Right arm Left arm Right arm   Patient Position: Sitting  Sitting Lying   Pulse: 63 (!) 52 68 (!) 55   Resp: 16 20 16 18   Temp: 97.2 °F (36.2 °C) 97.8 °F (36.6 °C) 98.3 °F (36.8 °C) 97.8 °F (36.6 °C)   TempSrc: Oral Oral Oral Oral   SpO2: 97% 97% 97% 97%   Weight:       Height:           Body mass index is 27.43 kg/m².          CONSTITUTIONAL  General Appearance: unremarkable, age appropriate    MUSCULOSKELETAL  Muscle Strength and Tone:no tremor, no tic  Abnormal Involuntary Movements: None  Gait and Station: non-ataxic    PSYCHIATRIC   Level of Consciousness: awake and alert   Orientation: person, place, time, and situation  Grooming: less Disheveled and Hospital garb  Psychomotor Behavior: normal, uncooperative, eye contact normal, no PMA/R  Speech: loud, profane, rapid at times  Language: grossly intact, able to name, able to repeat  Mood: fine  Affect: Labile  Thought Process: +tangential/derailed   Associations: loose   Thought Content: +delusions, denies SI, and denies HI  Perceptions: denies AH and denies  VH  Memory: Remote  intact and Recent intact  Attention:Easily distracted  Fund of Knowledge: Vocabulary appropriate   Estimate if Intelligence:  Below average based on work/education history, vocabulary and mental status exam  Insight: limited awareness of illness  Judgment: limited        DIAGNOSTIC TESTING   Laboratory Results  No results found for this or any previous visit (from the past 24 hour(s)).           MEDICAL DECISION MAKING          ASSESSMENT         Schizoaffective disorder, bipolar type, MRE manic, severe  Unspecified Anxiety Disorder    Psychosocial stressors    Medication non compliance  Agitation   H/o CVA            PROBLEM LIST AND MANAGEMENT PLANS        Schizoaffective disorder, bipolar type, MRE manic, severe  - continue zyprexa 5 mg PO qhs  -increase to 7.5 mg PO qhs  --> Titrate to 10mg PO nightly on 9/17- increase to 15 mg po q HS tonight   -start Adjunctive Depakote  mg po q HS- pt refused  - pt counseled  - follow up with outpatient mental health providers after discharge for medication management and psychotherapy     Unspecified Anxiety disorder: pt counseled  -depakote off-label as above- pt refused  -vistaril prn    Psychosocial stressors: pt counseled  -SW consulted to assist with resources      Medication non compliance  - consider forced meds if pt refuses tx, h/o of forced med protocol in past per EMR  - monitor     Agitation   - continue zyprexa 10 mg PO/IM q 4 hours PRN for behavioral emergencies  - pt counseled  - monitor/precautions    H/o CVA: pt counseled  -med consulted- no recs at this time           Discussed diagnosis, risks and benefits of proposed treatment vs alternative treatments vs no treatment, potential side effects of these treatments and the inherent unpredictability of treatment. The patient expresses understanding of the above and displays the capacity to agree with this treatment given said understanding. Patient also agrees that, currently, the benefits outweigh the  risks and would like to pursue/continue treatment at this time.    Any medications being used off-label were discussed with the patient inclusive of the evidence base for the use of the medications and consent was obtained for the off-label use of the medication.       DISCHARGE PLANNING  Expected Disposition Plan: Home or Self Care      NEED FOR CONTINUED HOSPITALIZATION  Psychiatric illness continues to pose a potential threat to life or bodily function, of self or others, thereby requiring the need for continued inpatient psychiatric hospitalization: Yes, due to: gravely disabled, as evidenced by:  Ongoing concerns with perceptual aberrancy and paranoid persecutory delusions leading to potential harm of self or others.    Protective inpatient pyschiatric hospitalization required while a safe disposition plan is enacted: Yes    Patient stabilized and ready for discharge from inpatient psychiatric unit: No          STAFF:   Julio Cesar Amin MD  Psychiatry

## 2023-09-19 PROCEDURE — 90833 PR PSYCHOTHERAPY W/PATIENT W/E&M, 30 MIN (ADD ON): ICD-10-PCS | Mod: ,,, | Performed by: PSYCHIATRY & NEUROLOGY

## 2023-09-19 PROCEDURE — 99233 PR SUBSEQUENT HOSPITAL CARE,LEVL III: ICD-10-PCS | Mod: ,,, | Performed by: PSYCHIATRY & NEUROLOGY

## 2023-09-19 PROCEDURE — 11400000 HC PSYCH PRIVATE ROOM

## 2023-09-19 PROCEDURE — 90833 PSYTX W PT W E/M 30 MIN: CPT | Mod: ,,, | Performed by: PSYCHIATRY & NEUROLOGY

## 2023-09-19 PROCEDURE — 25000003 PHARM REV CODE 250: Performed by: PSYCHIATRY & NEUROLOGY

## 2023-09-19 PROCEDURE — 99233 SBSQ HOSP IP/OBS HIGH 50: CPT | Mod: ,,, | Performed by: PSYCHIATRY & NEUROLOGY

## 2023-09-19 RX ORDER — OLANZAPINE 10 MG/1
20 TABLET ORAL NIGHTLY
Status: DISCONTINUED | OUTPATIENT
Start: 2023-09-19 | End: 2023-09-20 | Stop reason: HOSPADM

## 2023-09-19 RX ADMIN — HYDROXYZINE PAMOATE 50 MG: 50 CAPSULE ORAL at 09:09

## 2023-09-19 RX ADMIN — OLANZAPINE 20 MG: 10 TABLET, FILM COATED ORAL at 08:09
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
## 2023-09-19 NOTE — PLAN OF CARE
"POC reviewed is and is ongoing. Pt self isolates to room, ambulates on unit to have meals and makes phone calls, pt is medication compliant., quiet, calm, and cooperative on the unit. Pt states "He is dead and is inside a urn." Denies HI/SI/VH, does endorse AH. Will continue to monitor pt while on the unit to ensure his health and safety.   "
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
## 2023-09-20 VITALS
HEIGHT: 68 IN | DIASTOLIC BLOOD PRESSURE: 72 MMHG | OXYGEN SATURATION: 97 % | HEART RATE: 61 BPM | SYSTOLIC BLOOD PRESSURE: 114 MMHG | TEMPERATURE: 98 F | RESPIRATION RATE: 20 BRPM | BODY MASS INDEX: 27.34 KG/M2 | WEIGHT: 180.38 LBS

## 2023-09-20 PROBLEM — F23 ACUTE PSYCHOSIS: Status: RESOLVED | Noted: 2023-09-09 | Resolved: 2023-09-20

## 2023-09-20 PROCEDURE — 90833 PR PSYCHOTHERAPY W/PATIENT W/E&M, 30 MIN (ADD ON): ICD-10-PCS | Mod: ,,, | Performed by: PSYCHIATRY & NEUROLOGY

## 2023-09-20 PROCEDURE — 99239 PR HOSPITAL DISCHARGE DAY,>30 MIN: ICD-10-PCS | Mod: ,,, | Performed by: PSYCHIATRY & NEUROLOGY

## 2023-09-20 PROCEDURE — 99239 HOSP IP/OBS DSCHRG MGMT >30: CPT | Mod: ,,, | Performed by: PSYCHIATRY & NEUROLOGY

## 2023-09-20 PROCEDURE — 90833 PSYTX W PT W E/M 30 MIN: CPT | Mod: ,,, | Performed by: PSYCHIATRY & NEUROLOGY

## 2023-09-20 RX ORDER — OLANZAPINE 20 MG/1
20 TABLET ORAL NIGHTLY
Qty: 30 TABLET | Refills: 2 | Status: SHIPPED | OUTPATIENT
Start: 2023-09-20 | End: 2024-09-19

## 2023-09-20 NOTE — PLAN OF CARE
09/20/23 1210   Medicare Message   Important Message from Medicare regarding Discharge Appeal Rights Given to patient/caregiver;Other (comments);Explained to patient/caregiver;Signed/date by patient/caregiver   Date IMM was signed 09/20/23   Time IMM was signed 1137

## 2023-09-20 NOTE — NURSING
"POC reviewed this shift and is ongoing.  Pt is cooperative on unit and is compliant with medications.  Pt still exhibits anxiety about taking medications and makes very dramatic statements about his impending death from being  "over dosed on olanzapine because 20 (mg) is just too much.  I already  6 times and came back.".  Pt denies SI/HI/AVH.  "

## 2023-09-20 NOTE — NURSING
Patient has met all criteria to be discharged today.. Patient was explained all discharge instructions and the patient verbalized an understanding of those instructions. Patient was returned all personal belongings upon departure. Patient was escorted off the unit and out of the hospital by a staff member.

## 2023-09-20 NOTE — PLAN OF CARE
09/20/23 1201   Step 1: Warning Signs   Warning Sign 1 death of a loved one   Warning Sign 2 not having salvation   Step 2: Internal coping strategies - Things I can do to take my mind off my problems without contacting another person:   Coping Strategy 1 house work   Coping Strategy 2 praying   Coping Strategy 3 reading the bible   Step 3: People and social settings that provide distraction:   1. Name Zee Saha (wife)       Phone [phone]   2. Name Juliet Saha (mother)   3. Place [employer]   4. Place kids' home    Step 4: People whom I can ask for help:   1. Person Zee Saha       Phone [phone]   2. Person Juliet Saha   3. Person n/a   Step 5: Professionals or agencies I can contact during a crisis:   1. Clinician Name [facility]       Phone [phone]   3. Suicide Prevention Lifeline: 988 Suicide Prevention Lifeline 988   4. Local Emergency Service       911   Step 6: Making the environment safer (plan for lethal means safety)   Safe Environment Plan take medications   Safe Environment Optional: What is most important to me and worth living for? my immediate family   Safety Plan Tasks   Provided a Hard Copy to the Patient Y   Explained How to Follow the Steps Y   Discussed Facilitators and Barriers Y

## 2023-09-20 NOTE — PROGRESS NOTES
Psychotherapy:  Target symptoms: psychosis  Why chosen therapy is appropriate versus another modality: relevant to diagnosis, patient responds to this modality, evidence based practice  Outcome monitoring methods: self-report, observation  Therapeutic intervention type: insight oriented psychotherapy, behavior modifying psychotherapy, supportive psychotherapy, interactive psychotherapy  Topics discussed/themes: building skills sets for symptom management, symptom recognition  Safety planning and wrap up session  The patient's response to the intervention is accepting. The patient's progress toward treatment goals is fair.   Duration of intervention: 16 minutes    Julio Cesar Amin MD  Psychiatry
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
## 2023-10-05 ENCOUNTER — HOSPITAL ENCOUNTER (EMERGENCY)
Facility: HOSPITAL | Age: 63
Discharge: HOME | End: 2023-10-05
Attending: EMERGENCY MEDICINE
Payer: MEDICARE

## 2023-10-05 VITALS
RESPIRATION RATE: 16 BRPM | TEMPERATURE: 100 F | HEART RATE: 94 BPM | OXYGEN SATURATION: 98 % | WEIGHT: 194.31 LBS | DIASTOLIC BLOOD PRESSURE: 103 MMHG | SYSTOLIC BLOOD PRESSURE: 142 MMHG | BODY MASS INDEX: 29.55 KG/M2

## 2023-10-05 DIAGNOSIS — L03.116 CELLULITIS OF LEFT KNEE: Primary | ICD-10-CM

## 2023-10-05 DIAGNOSIS — L08.9 WOUND INFECTION: ICD-10-CM

## 2023-10-05 DIAGNOSIS — T14.8XXA WOUND INFECTION: ICD-10-CM

## 2023-10-05 LAB
ALBUMIN SERPL BCP-MCNC: 3.7 G/DL (ref 3.5–5.2)
ALP SERPL-CCNC: 68 U/L (ref 55–135)
ALT SERPL W/O P-5'-P-CCNC: 8 U/L (ref 10–44)
ANION GAP SERPL CALC-SCNC: 8 MMOL/L (ref 8–16)
AST SERPL-CCNC: 13 U/L (ref 10–40)
BASOPHILS # BLD AUTO: 0.05 K/UL (ref 0–0.2)
BASOPHILS NFR BLD: 0.4 % (ref 0–1.9)
BILIRUB SERPL-MCNC: 0.6 MG/DL (ref 0.1–1)
BUN SERPL-MCNC: 13 MG/DL (ref 8–23)
CALCIUM SERPL-MCNC: 9.8 MG/DL (ref 8.7–10.5)
CHLORIDE SERPL-SCNC: 109 MMOL/L (ref 95–110)
CO2 SERPL-SCNC: 23 MMOL/L (ref 23–29)
CREAT SERPL-MCNC: 1.3 MG/DL (ref 0.5–1.4)
DIFFERENTIAL METHOD: ABNORMAL
EOSINOPHIL # BLD AUTO: 0.1 K/UL (ref 0–0.5)
EOSINOPHIL NFR BLD: 1.2 % (ref 0–8)
ERYTHROCYTE [DISTWIDTH] IN BLOOD BY AUTOMATED COUNT: 13.4 % (ref 11.5–14.5)
EST. GFR  (NO RACE VARIABLE): >60 ML/MIN/1.73 M^2
GLUCOSE SERPL-MCNC: 109 MG/DL (ref 70–110)
HCT VFR BLD AUTO: 41.9 % (ref 40–54)
HGB BLD-MCNC: 14.4 G/DL (ref 14–18)
IMM GRANULOCYTES # BLD AUTO: 0.04 K/UL (ref 0–0.04)
IMM GRANULOCYTES NFR BLD AUTO: 0.4 % (ref 0–0.5)
LYMPHOCYTES # BLD AUTO: 2.6 K/UL (ref 1–4.8)
LYMPHOCYTES NFR BLD: 22.6 % (ref 18–48)
MCH RBC QN AUTO: 30.6 PG (ref 27–31)
MCHC RBC AUTO-ENTMCNC: 34.4 G/DL (ref 32–36)
MCV RBC AUTO: 89 FL (ref 82–98)
MONOCYTES # BLD AUTO: 1.1 K/UL (ref 0.3–1)
MONOCYTES NFR BLD: 9.4 % (ref 4–15)
NEUTROPHILS # BLD AUTO: 7.5 K/UL (ref 1.8–7.7)
NEUTROPHILS NFR BLD: 66 % (ref 38–73)
NRBC BLD-RTO: 0 /100 WBC
PLATELET # BLD AUTO: 201 K/UL (ref 150–450)
PMV BLD AUTO: 9.3 FL (ref 9.2–12.9)
POTASSIUM SERPL-SCNC: 3.8 MMOL/L (ref 3.5–5.1)
PROT SERPL-MCNC: 7.5 G/DL (ref 6–8.4)
RBC # BLD AUTO: 4.7 M/UL (ref 4.6–6.2)
SODIUM SERPL-SCNC: 140 MMOL/L (ref 136–145)
WBC # BLD AUTO: 11.28 K/UL (ref 3.9–12.7)

## 2023-10-05 PROCEDURE — 85025 COMPLETE CBC W/AUTO DIFF WBC: CPT | Performed by: NURSE PRACTITIONER

## 2023-10-05 PROCEDURE — 99284 EMERGENCY DEPT VISIT MOD MDM: CPT

## 2023-10-05 PROCEDURE — 80053 COMPREHEN METABOLIC PANEL: CPT | Performed by: NURSE PRACTITIONER

## 2023-10-05 RX ORDER — LIDOCAINE HYDROCHLORIDE 10 MG/ML
10 INJECTION, SOLUTION EPIDURAL; INFILTRATION; INTRACAUDAL; PERINEURAL
Status: DISCONTINUED | OUTPATIENT
Start: 2023-10-05 | End: 2023-10-05 | Stop reason: HOSPADM

## 2023-10-05 RX ORDER — SULFAMETHOXAZOLE AND TRIMETHOPRIM 800; 160 MG/1; MG/1
1 TABLET ORAL 2 TIMES DAILY
Qty: 14 TABLET | Refills: 0 | Status: SHIPPED | OUTPATIENT
Start: 2023-10-05 | End: 2023-10-12

## 2023-10-05 NOTE — FIRST PROVIDER EVALUATION
Medical screening examination initiated.  I have conducted a focused provider triage encounter, findings are as follows:    Brief history of present illness:  Patient presents to ER for draining wound to left anterior knee, onset approximately 1 week ago.    There were no vitals filed for this visit.    Pertinent physical exam:  +localized erythema to anterior left knee with + mild drainage.    Brief workup plan:  labs, imaging, I&D    Preliminary workup initiated; this workup will be continued and followed by the physician or advanced practice provider that is assigned to the patient when roomed.

## 2023-10-08 NOTE — ED PROVIDER NOTES
Encounter Date: 10/5/2023       History     Chief Complaint   Patient presents with    Knee Pain     Pt c/o draining wound to left knee.  Pt was seen at urgent care and sent here.       Patient complains of left knee infection with drainage for over a week.        Review of patient's allergies indicates:   Allergen Reactions    Haldol [haloperidol lactate]      Shaking      Lamictal [lamotrigine]     Trileptal [oxcarbazepine]      Past Medical History:   Diagnosis Date    Depression      No past surgical history on file.  No family history on file.  Social History     Tobacco Use    Smoking status: Never   Substance Use Topics    Alcohol use: Never    Drug use: Never     Review of Systems   Constitutional:  Negative for fever.   HENT:  Negative for sore throat.    Respiratory:  Negative for shortness of breath.    Cardiovascular:  Negative for chest pain.   Gastrointestinal:  Negative for nausea.   Genitourinary:  Negative for dysuria.   Musculoskeletal:  Negative for back pain.   Skin:  Negative for rash.   Neurological:  Negative for weakness.   Hematological:  Does not bruise/bleed easily.       Physical Exam     Initial Vitals [10/05/23 1510]   BP Pulse Resp Temp SpO2   (!) 142/103 94 16 100.3 °F (37.9 °C) 98 %      MAP       --         Physical Exam    Nursing note and vitals reviewed.  Constitutional: He appears well-developed and well-nourished.   HENT:   Head: Normocephalic and atraumatic.   Eyes: Conjunctivae are normal. Pupils are equal, round, and reactive to light.   Neck: Neck supple.   Normal range of motion.  Cardiovascular:  Normal rate, regular rhythm, normal heart sounds and intact distal pulses.           Pulmonary/Chest: Breath sounds normal.   Abdominal: Abdomen is soft. There is no rebound and no guarding.   Musculoskeletal:         General: Normal range of motion.      Cervical back: Normal range of motion and neck supple.      Comments: Left knee with clear drainage from a central area of  erythema swelling and mild tenderness to palpation.     Neurological: He is alert.   Skin: Skin is warm and dry.   Psychiatric: He has a normal mood and affect. His behavior is normal. Thought content normal.         ED Course   Procedures  Labs Reviewed   CBC W/ AUTO DIFFERENTIAL - Abnormal; Notable for the following components:       Result Value    Mono # 1.1 (*)     All other components within normal limits   COMPREHENSIVE METABOLIC PANEL - Abnormal; Notable for the following components:    ALT 8 (*)     All other components within normal limits          Imaging Results              X-Ray Knee Complete 4 or More Views Left (Final result)  Result time 10/05/23 15:40:48      Final result by Graham Bernal MD (10/05/23 15:40:48)                   Impression:      No acute finding.      Electronically signed by: Graham Bernal  Date:    10/05/2023  Time:    15:40               Narrative:    EXAMINATION:  XR KNEE COMP 4 OR MORE VIEWS LEFT    CLINICAL HISTORY:  Other injury of unspecified body region, initial encounter    TECHNIQUE:  AP, Lateral, Sunrise and Tunnel views of the left knee were preformed    COMPARISON:  None    FINDINGS:  Mediolateral compartments are maintained.  No significant degenerative changes.  No joint effusion.  No acute fracture or dislocation.  Atherosclerosis.                                       Medications - No data to display  Medical Decision Making  Patient refusing x-ray and further evaluation for knee infection.  Wants to go home with antibiotics.  We will respect shared decision-making process    Risk  Prescription drug management.                               Clinical Impression:   Final diagnoses:  [T14.8XXA, L08.9] Wound infection  [L03.116] Cellulitis of left knee (Primary)        ED Disposition Condition    Discharge Stable          ED Prescriptions       Medication Sig Dispense Start Date End Date Auth. Provider    sulfamethoxazole-trimethoprim 800-160mg (BACTRIM DS)  800-160 mg Tab Take 1 tablet by mouth 2 (two) times daily. for 7 days 14 tablet 10/5/2023 10/12/2023 Jona Suazo NP          Follow-up Information       Follow up With Specialties Details Why Contact Info    Shari Colon MD Internal Medicine Schedule an appointment as soon as possible for a visit  As needed Formerly named [facility] [address] [address]  [phone]               Jona Suazo NP  10/08/23 [id number]

## 2024-04-16 NOTE — PLAN OF CARE
EKG obtained, seen by Dr. Dukes and given to Dr. Meng. Pt placed on cardiac monitor.    POC reviewed this shift and is on going. Patient is withdrawn, calm, cooperative, quiet, anxious, irritable, and showing pressured speech. Denies Suicidal Ideation, Homicidal Ideation, Auditory Hallucinations, Visual Hallucinations, Tactile Hallucinations, Gustatory Hallucinations, and Delusions. Patient stays in his room most of the day. Patient but has been out of his room more today than yesterday. Patient continues saying if he takes 10mg of zyprexa that he will die. Patient seems delusional. Pt continues to be medication compliant and staff will continue to monitor pt closely while on the unit.